# Patient Record
Sex: FEMALE | Race: WHITE | NOT HISPANIC OR LATINO | Employment: OTHER | URBAN - METROPOLITAN AREA
[De-identification: names, ages, dates, MRNs, and addresses within clinical notes are randomized per-mention and may not be internally consistent; named-entity substitution may affect disease eponyms.]

---

## 2023-03-06 ENCOUNTER — TELEPHONE (OUTPATIENT)
Dept: PRIMARY CARE | Facility: CLINIC | Age: 78
End: 2023-03-06
Payer: MEDICARE

## 2023-03-09 RX ORDER — CEPHALEXIN 500 MG/1
CAPSULE ORAL
COMMUNITY
Start: 2022-06-28 | End: 2023-03-10 | Stop reason: ALTCHOICE

## 2023-03-09 RX ORDER — IBUPROFEN 200 MG
CAPSULE ORAL
COMMUNITY

## 2023-03-09 RX ORDER — LACTASE 9000 UNIT
TABLET ORAL
COMMUNITY
End: 2024-01-23 | Stop reason: WASHOUT

## 2023-03-09 RX ORDER — AMLODIPINE BESYLATE 5 MG/1
1 TABLET ORAL DAILY
COMMUNITY
Start: 2020-01-15 | End: 2023-08-21 | Stop reason: DRUGHIGH

## 2023-03-09 RX ORDER — NALOXONE HYDROCHLORIDE 4 MG/.1ML
SPRAY NASAL
COMMUNITY
Start: 2020-11-12 | End: 2024-01-23 | Stop reason: WASHOUT

## 2023-03-09 RX ORDER — VIT C/E/ZN/COPPR/LUTEIN/ZEAXAN 250MG-90MG
CAPSULE ORAL
COMMUNITY
End: 2024-06-10 | Stop reason: SDUPTHER

## 2023-03-09 RX ORDER — ASPIRIN 81 MG/1
1 TABLET ORAL DAILY
COMMUNITY

## 2023-03-09 RX ORDER — ATORVASTATIN CALCIUM 40 MG/1
1 TABLET, FILM COATED ORAL NIGHTLY
COMMUNITY
Start: 2019-09-23 | End: 2023-08-21 | Stop reason: SDUPTHER

## 2023-03-09 RX ORDER — OXYCODONE HCL 20 MG/1
1 TABLET, FILM COATED, EXTENDED RELEASE ORAL EVERY 12 HOURS
COMMUNITY
End: 2023-06-02 | Stop reason: DRUGHIGH

## 2023-03-09 RX ORDER — POLYETHYLENE GLYCOL 3350 17 G/17G
POWDER, FOR SOLUTION ORAL EVERY OTHER DAY
COMMUNITY
Start: 2020-02-28

## 2023-03-09 RX ORDER — DOCUSATE SODIUM 100 MG/1
CAPSULE, LIQUID FILLED ORAL 2 TIMES DAILY
COMMUNITY

## 2023-03-09 RX ORDER — HYDROGEN PEROXIDE 3 %
1 SOLUTION, NON-ORAL MISCELLANEOUS DAILY
COMMUNITY

## 2023-03-09 RX ORDER — NYSTATIN 100000 [USP'U]/ML
SUSPENSION ORAL
COMMUNITY
Start: 2017-08-03 | End: 2023-03-10 | Stop reason: ALTCHOICE

## 2023-03-09 RX ORDER — ACETAMINOPHEN 500 MG
1 TABLET ORAL 3 TIMES DAILY
COMMUNITY
Start: 2020-09-01 | End: 2024-01-23 | Stop reason: WASHOUT

## 2023-03-09 RX ORDER — GABAPENTIN 100 MG/1
CAPSULE ORAL
COMMUNITY
Start: 2020-11-12 | End: 2023-06-05 | Stop reason: DRUGHIGH

## 2023-03-10 ENCOUNTER — OFFICE VISIT (OUTPATIENT)
Dept: PRIMARY CARE | Facility: CLINIC | Age: 78
End: 2023-03-10
Payer: MEDICARE

## 2023-03-10 VITALS
SYSTOLIC BLOOD PRESSURE: 160 MMHG | OXYGEN SATURATION: 94 % | WEIGHT: 149.4 LBS | DIASTOLIC BLOOD PRESSURE: 77 MMHG | HEART RATE: 102 BPM | BODY MASS INDEX: 24.11 KG/M2 | TEMPERATURE: 96.5 F

## 2023-03-10 DIAGNOSIS — F41.9 ACUTE ANXIETY: Primary | ICD-10-CM

## 2023-03-10 PROBLEM — M16.11 PRIMARY OSTEOARTHRITIS OF RIGHT HIP: Status: ACTIVE | Noted: 2023-03-10

## 2023-03-10 PROBLEM — R73.01 IMPAIRED FASTING GLUCOSE: Status: ACTIVE | Noted: 2023-03-10

## 2023-03-10 PROBLEM — M85.80 OSTEOPENIA: Status: ACTIVE | Noted: 2023-03-10

## 2023-03-10 PROBLEM — M43.06 LUMBAR SPONDYLOLYSIS: Status: ACTIVE | Noted: 2023-03-10

## 2023-03-10 PROBLEM — H91.93 BILATERAL HEARING LOSS: Status: ACTIVE | Noted: 2023-03-10

## 2023-03-10 PROBLEM — I49.3 PVC'S (PREMATURE VENTRICULAR CONTRACTIONS): Status: ACTIVE | Noted: 2023-03-10

## 2023-03-10 PROBLEM — R91.1 LUNG NODULE, SOLITARY: Status: ACTIVE | Noted: 2023-03-10

## 2023-03-10 PROBLEM — J34.2 NASAL SEPTAL DEVIATION: Status: RESOLVED | Noted: 2023-03-10 | Resolved: 2023-03-10

## 2023-03-10 PROBLEM — I45.2 RBBB (RIGHT BUNDLE BRANCH BLOCK WITH LEFT ANTERIOR FASCICULAR BLOCK): Status: ACTIVE | Noted: 2023-03-10

## 2023-03-10 PROBLEM — E06.3 HASHIMOTO'S THYROIDITIS: Status: ACTIVE | Noted: 2023-03-10

## 2023-03-10 PROBLEM — I10 BENIGN ESSENTIAL HYPERTENSION: Status: ACTIVE | Noted: 2023-03-10

## 2023-03-10 PROBLEM — M62.89 PELVIC FLOOR DYSFUNCTION: Status: ACTIVE | Noted: 2023-03-10

## 2023-03-10 PROBLEM — M25.552 HIP PAIN, BILATERAL: Status: ACTIVE | Noted: 2023-03-10

## 2023-03-10 PROBLEM — R79.89 LOW TSH LEVEL: Status: ACTIVE | Noted: 2023-03-10

## 2023-03-10 PROBLEM — E05.90 SUBCLINICAL HYPERTHYROIDISM: Status: ACTIVE | Noted: 2023-03-10

## 2023-03-10 PROBLEM — M25.551 HIP PAIN, BILATERAL: Status: ACTIVE | Noted: 2023-03-10

## 2023-03-10 PROBLEM — S63.509A WRIST SPRAIN: Status: RESOLVED | Noted: 2023-03-10 | Resolved: 2023-03-10

## 2023-03-10 PROBLEM — E04.2 MULTIPLE THYROID NODULES: Status: ACTIVE | Noted: 2023-03-10

## 2023-03-10 PROBLEM — E78.5 HYPERLIPIDEMIA: Status: ACTIVE | Noted: 2023-03-10

## 2023-03-10 PROBLEM — K59.09 CHRONIC CONSTIPATION: Status: ACTIVE | Noted: 2023-03-10

## 2023-03-10 PROBLEM — J34.3 NASAL TURBINATE HYPERTROPHY: Status: RESOLVED | Noted: 2023-03-10 | Resolved: 2023-03-10

## 2023-03-10 PROBLEM — K21.9 GERD (GASTROESOPHAGEAL REFLUX DISEASE): Status: ACTIVE | Noted: 2023-03-10

## 2023-03-10 PROBLEM — J45.20 MILD INTERMITTENT ASTHMA (HHS-HCC): Status: ACTIVE | Noted: 2023-03-10

## 2023-03-10 PROBLEM — H93.13 TINNITUS OF BOTH EARS: Status: ACTIVE | Noted: 2023-03-10

## 2023-03-10 PROBLEM — R93.1 AGATSTON CAC SCORE, >400: Status: ACTIVE | Noted: 2023-03-10

## 2023-03-10 PROBLEM — M48.061 SPINAL STENOSIS OF LUMBAR REGION: Status: ACTIVE | Noted: 2023-03-10

## 2023-03-10 PROCEDURE — 1160F RVW MEDS BY RX/DR IN RCRD: CPT | Performed by: FAMILY MEDICINE

## 2023-03-10 PROCEDURE — 99213 OFFICE O/P EST LOW 20 MIN: CPT | Performed by: FAMILY MEDICINE

## 2023-03-10 PROCEDURE — 3077F SYST BP >= 140 MM HG: CPT | Performed by: FAMILY MEDICINE

## 2023-03-10 PROCEDURE — 1036F TOBACCO NON-USER: CPT | Performed by: FAMILY MEDICINE

## 2023-03-10 PROCEDURE — 1159F MED LIST DOCD IN RCRD: CPT | Performed by: FAMILY MEDICINE

## 2023-03-10 PROCEDURE — 3078F DIAST BP <80 MM HG: CPT | Performed by: FAMILY MEDICINE

## 2023-03-10 PROCEDURE — 1157F ADVNC CARE PLAN IN RCRD: CPT | Performed by: FAMILY MEDICINE

## 2023-03-10 RX ORDER — BUSPIRONE HYDROCHLORIDE 15 MG/1
7.5 TABLET ORAL 2 TIMES DAILY
Qty: 30 TABLET | Refills: 5 | Status: SHIPPED | OUTPATIENT
Start: 2023-03-10 | End: 2023-03-29 | Stop reason: SDUPTHER

## 2023-03-10 RX ORDER — OXYCODONE HYDROCHLORIDE 10 MG/1
1 TABLET ORAL EVERY 6 HOURS PRN
COMMUNITY
Start: 2022-12-20 | End: 2023-06-02 | Stop reason: DRUGHIGH

## 2023-03-10 ASSESSMENT — ENCOUNTER SYMPTOMS: DEPRESSION: 1

## 2023-03-10 NOTE — PROGRESS NOTES
Subjective   Patient ID: Robyn Madera is a 78 y.o. female who presents for Depression and Anxiety.  Depression  Anxiety        Patient presents today with complaint of severe anxiety. Patient recently saw a new pain management provider - had been on narcotic regimen for over 1 year - now plan to wean which has precipitated this anxiety - denies suicidal or homicidal ideation. Otherwise feels well. No other complaints or concerns.    The patient's relevant past medical, surgical, family and social history was reviewed in Epic.    Review of Systems   Psychiatric/Behavioral:  Positive for depression.      A complete review of systems was performed and all systems were normal except what is noted in the HPI.    Objective   Physical Exam  Neurological:      Mental Status: She is alert.   Psychiatric:         Mood and Affect: Mood is anxious.             Assessment/Plan   Problem List Items Addressed This Visit       Acute anxiety - Primary     Start buspar as directed Risks, benefits and side effects reviewed with patient.   Call for worsening depression or anxiety, suicidal or homicidal ideation.   Will discuss circumstances with Pain Management  Reevaluate in 2 weeks..           Relevant Medications    busPIRone (Buspar) 15 mg tablet        Patient understands and agrees with treatment plan.         Reanna Martinez MD

## 2023-03-10 NOTE — ASSESSMENT & PLAN NOTE
Start buspar as directed Risks, benefits and side effects reviewed with patient.   Call for worsening depression or anxiety, suicidal or homicidal ideation.   Will discuss circumstances with Pain Management  Reevaluate in 2 weeks..

## 2023-03-22 RX ORDER — NYSTATIN 100000 [USP'U]/ML
10 SUSPENSION ORAL 4 TIMES DAILY
COMMUNITY
Start: 2023-03-18 | End: 2023-06-05 | Stop reason: ALTCHOICE

## 2023-03-25 PROBLEM — R79.89 LOW TSH LEVEL: Status: RESOLVED | Noted: 2023-03-10 | Resolved: 2023-03-25

## 2023-03-25 PROBLEM — R91.1 LUNG NODULE, SOLITARY: Status: RESOLVED | Noted: 2023-03-10 | Resolved: 2023-03-25

## 2023-03-25 NOTE — PROGRESS NOTES
Subjective   Patient ID: Robyn Madera is a 78 y.o. female who presents for No chief complaint on file..  HPI  Patient presents today with complaint of anxiety.    Started on buspar 15 mg 1/2 BID 2 weeks ago. Tolerating medication well.   Otherwise feels well. No other complaints or concerns.    The patient's relevant past medical, surgical, family and social history was reviewed in Epic.    Review of Systems  A complete review of systems was performed and all systems were normal except what is noted in the HPI.    Objective   Physical Exam        Assessment/Plan   Problem List Items Addressed This Visit       Acute anxiety - Primary     Call for worsening depression or anxiety, suicidal or homicidal ideation.              Patient understands and agrees with treatment plan.         Reanna Martinez MD

## 2023-03-27 ENCOUNTER — APPOINTMENT (OUTPATIENT)
Dept: PRIMARY CARE | Facility: CLINIC | Age: 78
End: 2023-03-27
Payer: MEDICARE

## 2023-03-29 ENCOUNTER — OFFICE VISIT (OUTPATIENT)
Dept: PRIMARY CARE | Facility: CLINIC | Age: 78
End: 2023-03-29
Payer: MEDICARE

## 2023-03-29 VITALS
WEIGHT: 150.2 LBS | HEART RATE: 85 BPM | DIASTOLIC BLOOD PRESSURE: 76 MMHG | TEMPERATURE: 96.3 F | BODY MASS INDEX: 24.24 KG/M2 | SYSTOLIC BLOOD PRESSURE: 138 MMHG | OXYGEN SATURATION: 95 %

## 2023-03-29 DIAGNOSIS — F41.9 ACUTE ANXIETY: Primary | ICD-10-CM

## 2023-03-29 PROBLEM — K44.9 HIATAL HERNIA: Status: ACTIVE | Noted: 2018-04-03

## 2023-03-29 PROCEDURE — 3075F SYST BP GE 130 - 139MM HG: CPT | Performed by: FAMILY MEDICINE

## 2023-03-29 PROCEDURE — 1157F ADVNC CARE PLAN IN RCRD: CPT | Performed by: FAMILY MEDICINE

## 2023-03-29 PROCEDURE — 3078F DIAST BP <80 MM HG: CPT | Performed by: FAMILY MEDICINE

## 2023-03-29 PROCEDURE — 99213 OFFICE O/P EST LOW 20 MIN: CPT | Performed by: FAMILY MEDICINE

## 2023-03-29 PROCEDURE — 1160F RVW MEDS BY RX/DR IN RCRD: CPT | Performed by: FAMILY MEDICINE

## 2023-03-29 PROCEDURE — 1159F MED LIST DOCD IN RCRD: CPT | Performed by: FAMILY MEDICINE

## 2023-03-29 PROCEDURE — 1036F TOBACCO NON-USER: CPT | Performed by: FAMILY MEDICINE

## 2023-03-29 RX ORDER — BUSPIRONE HYDROCHLORIDE 15 MG/1
15 TABLET ORAL 2 TIMES DAILY
Qty: 30 TABLET | Refills: 5 | Status: SHIPPED | OUTPATIENT
Start: 2023-03-29 | End: 2023-06-23 | Stop reason: SDUPTHER

## 2023-03-29 NOTE — PROGRESS NOTES
Subjective   Patient ID: Robyn Madera is a 78 y.o. female who presents for Anxiety.  HPI  Patient presents today with complaint of anxiety.      Anxiety a little better on buspar but still with complaint of some breakthrough symptoms. No side effects - denies suicidal or homicidal ideation. Has appointment pain management 4/10/23.    Otherwise feels well. No other complaints or concerns.    The patient's relevant past medical, surgical, family and social history was reviewed in Epic.    Review of Systems  A complete review of systems was performed and all systems were normal except what is noted in the HPI.    Objective   Physical Exam  Constitutional:       General: She is not in acute distress.     Appearance: Normal appearance.   HENT:      Head: Normocephalic and atraumatic.   Cardiovascular:      Rate and Rhythm: Normal rate and regular rhythm.      Heart sounds: Normal heart sounds.   Pulmonary:      Effort: Pulmonary effort is normal.      Breath sounds: Normal breath sounds.   Abdominal:      General: Abdomen is flat. Bowel sounds are normal.      Palpations: Abdomen is soft.      Tenderness: There is no abdominal tenderness.   Musculoskeletal:      Right lower leg: No edema.      Left lower leg: No edema.   Neurological:      Mental Status: She is alert.   Psychiatric:         Attention and Perception: Attention normal.         Mood and Affect: Mood is anxious.         Speech: Speech normal.         Behavior: Behavior normal.         Thought Content: Thought content normal.             Assessment/Plan   Problem List Items Addressed This Visit       Acute anxiety - Primary     Increase buspar to 1 tab BID Risks, benefits and side effects reviewed with patient.  Consider counseling - declines at this time  Call for worsening depression or anxiety, suicidal or homicidal ideation.   Reevaluate in 2 months.           Relevant Medications    busPIRone (Buspar) 15 mg tablet        Patient understands and  agrees with treatment plan.         Reanna Martinez MD

## 2023-03-29 NOTE — ASSESSMENT & PLAN NOTE
Increase buspar to 1 tab BID Risks, benefits and side effects reviewed with patient.  Consider counseling - declines at this time  Call for worsening depression or anxiety, suicidal or homicidal ideation.   Reevaluate in 2 months.

## 2023-04-20 ENCOUNTER — TELEPHONE (OUTPATIENT)
Dept: PRIMARY CARE | Facility: CLINIC | Age: 78
End: 2023-04-20
Payer: MEDICARE

## 2023-04-20 NOTE — TELEPHONE ENCOUNTER
You know that she is in a med change. Getting a daily headache.   Weaning off opioids  Symptoms include Headache, nausea like morning sickness. Using pepto bismol to control nausea. In the early afternoon she experiences Sciatica, leg neuropathy, usually her left leg but both legs today    She has the Oxycontin cut from 20-10mg.    Oxycodon 5mg immediate release as a late late afternoon bridge. She is allowed to use two a day but is only using one.    Question: Is it ok for her to take Acetaminophen 500mg rapid release extra strength.  up to  2-3 tablets per day for about a week to billings through these headaches.     Patient states we can leave message f she is not home

## 2023-06-02 PROBLEM — Z96.651 PRESENCE OF RIGHT ARTIFICIAL KNEE JOINT: Status: RESOLVED | Noted: 2017-09-21 | Resolved: 2023-06-02

## 2023-06-02 RX ORDER — ONDANSETRON 4 MG/1
4 TABLET, FILM COATED ORAL 3 TIMES DAILY PRN
COMMUNITY
Start: 2023-04-11 | End: 2023-08-21 | Stop reason: ALTCHOICE

## 2023-06-02 RX ORDER — OXYCODONE HYDROCHLORIDE 10 MG/1
1 TABLET, FILM COATED, EXTENDED RELEASE ORAL DAILY
COMMUNITY
Start: 2023-04-18 | End: 2023-08-16 | Stop reason: WASHOUT

## 2023-06-02 RX ORDER — OXYCODONE HYDROCHLORIDE 5 MG/1
1 TABLET ORAL 2 TIMES DAILY PRN
COMMUNITY
Start: 2023-04-18 | End: 2023-10-17 | Stop reason: SDUPTHER

## 2023-06-05 ENCOUNTER — OFFICE VISIT (OUTPATIENT)
Dept: PRIMARY CARE | Facility: CLINIC | Age: 78
End: 2023-06-05
Payer: MEDICARE

## 2023-06-05 VITALS
OXYGEN SATURATION: 94 % | HEIGHT: 66 IN | TEMPERATURE: 97.6 F | BODY MASS INDEX: 24.46 KG/M2 | HEART RATE: 93 BPM | WEIGHT: 152.2 LBS | DIASTOLIC BLOOD PRESSURE: 74 MMHG | SYSTOLIC BLOOD PRESSURE: 162 MMHG

## 2023-06-05 DIAGNOSIS — I10 BENIGN ESSENTIAL HYPERTENSION: ICD-10-CM

## 2023-06-05 DIAGNOSIS — M48.062 SPINAL STENOSIS OF LUMBAR REGION WITH NEUROGENIC CLAUDICATION: ICD-10-CM

## 2023-06-05 DIAGNOSIS — F41.1 GENERALIZED ANXIETY DISORDER: Primary | ICD-10-CM

## 2023-06-05 DIAGNOSIS — R91.1 LUNG NODULE, SOLITARY: ICD-10-CM

## 2023-06-05 PROCEDURE — 1157F ADVNC CARE PLAN IN RCRD: CPT | Performed by: FAMILY MEDICINE

## 2023-06-05 PROCEDURE — 3077F SYST BP >= 140 MM HG: CPT | Performed by: FAMILY MEDICINE

## 2023-06-05 PROCEDURE — 99213 OFFICE O/P EST LOW 20 MIN: CPT | Performed by: FAMILY MEDICINE

## 2023-06-05 PROCEDURE — 1160F RVW MEDS BY RX/DR IN RCRD: CPT | Performed by: FAMILY MEDICINE

## 2023-06-05 PROCEDURE — 3078F DIAST BP <80 MM HG: CPT | Performed by: FAMILY MEDICINE

## 2023-06-05 PROCEDURE — 1036F TOBACCO NON-USER: CPT | Performed by: FAMILY MEDICINE

## 2023-06-05 PROCEDURE — 1159F MED LIST DOCD IN RCRD: CPT | Performed by: FAMILY MEDICINE

## 2023-06-05 RX ORDER — HYDROXYZINE PAMOATE 25 MG/1
25 CAPSULE ORAL 3 TIMES DAILY PRN
COMMUNITY
End: 2023-08-21 | Stop reason: ALTCHOICE

## 2023-06-05 RX ORDER — GABAPENTIN 100 MG/1
300 CAPSULE ORAL 3 TIMES DAILY
Qty: 810 CAPSULE | Refills: 3 | Status: SHIPPED
Start: 2023-06-05 | End: 2023-12-10 | Stop reason: SDUPTHER

## 2023-06-05 RX ORDER — KETOCONAZOLE 20 MG/ML
2 SHAMPOO, SUSPENSION TOPICAL AS NEEDED
COMMUNITY
Start: 2023-05-23 | End: 2024-01-23 | Stop reason: WASHOUT

## 2023-06-05 RX ORDER — DULOXETIN HYDROCHLORIDE 30 MG/1
30 CAPSULE, DELAYED RELEASE ORAL DAILY
Qty: 30 CAPSULE | Refills: 5 | Status: SHIPPED | OUTPATIENT
Start: 2023-06-05 | End: 2023-08-21 | Stop reason: DRUGHIGH

## 2023-06-05 NOTE — ASSESSMENT & PLAN NOTE
Overall improved  Continue current dose buspar  Start cymbalta 30 mg daily Risks, benefits and side effects reviewed with patient.   Can discuss further titration with Pain Mgt  May use prn vistaril as prescribed by Pain Mgt  Call for worsening depression or anxiety, suicidal or homicidal ideation.   Reevaluate in 2 months.

## 2023-06-05 NOTE — ASSESSMENT & PLAN NOTE
Elevated today but patient very anxious  Running 130s at home - will continue to monitor and call if consistently >140/90  Reevaluate in 2 months.

## 2023-06-05 NOTE — PROGRESS NOTES
Subjective   Patient ID: Robyn Madera is a 78 y.o. female who presents for Follow-up (2 month ).      HPI  Patient presents today with complaint of anxiety. In proces of weaning off opioids per Pain Management. This has caused significant anxiety for patient. Anxiety is improved on buspar but still with some irritability and breakthrough symptoms - denies suicidal or homicidal ideation.  Pain has increased some on lower doses of meds. Per patient plan is to start her on cymbalta. Sees Pain Mgt 6/18/23.    Otherwise feels well. No other complaints or concerns.    The patient's relevant past medical, surgical, family and social history was reviewed in Epic.    Review of Systems  A complete review of systems was performed and all systems were normal except what is noted in the HPI.    Objective   Physical Exam  Constitutional:       General: She is not in acute distress.     Appearance: Normal appearance.   HENT:      Head: Normocephalic and atraumatic.   Cardiovascular:      Rate and Rhythm: Normal rate and regular rhythm.      Heart sounds: Normal heart sounds.   Pulmonary:      Effort: Pulmonary effort is normal.      Breath sounds: Normal breath sounds.   Abdominal:      General: Abdomen is flat. Bowel sounds are normal.      Palpations: Abdomen is soft.      Tenderness: There is no abdominal tenderness.   Musculoskeletal:      Right lower leg: No edema.      Left lower leg: No edema.   Neurological:      Mental Status: She is alert.   Psychiatric:         Mood and Affect: Mood is anxious.         Behavior: Behavior normal.         Thought Content: Thought content normal.             Assessment/Plan   Problem List Items Addressed This Visit       Benign essential hypertension     Elevated today but patient very anxious  Running 130s at home - will continue to monitor and call if consistently >140/90  Reevaluate in 2 months.           Lung nodule, solitary    Relevant Orders    CT chest wo IV contrast    Spinal  stenosis of lumbar region - Primary    Relevant Medications    DULoxetine (Cymbalta) 30 mg DR capsule    Generalized anxiety disorder     Overall improved  Continue current dose buspar  Start cymbalta 30 mg daily Risks, benefits and side effects reviewed with patient.   Can discuss further titration with Pain Mgt  May use prn vistaril as prescribed by Pain Mgt  Call for worsening depression or anxiety, suicidal or homicidal ideation.   Reevaluate in 2 months.               Patient understands and agrees with treatment plan.         Reanna Martinez MD

## 2023-06-05 NOTE — PATIENT INSTRUCTIONS
I would like you to follow up in 2 months  Please have all labs that were ordered done at least 1 week prior to your visit.     Continue current medication for hypertension. Continue to monitor blood pressure.  Call if blood pressure consistently >140/90.  Low salt diet recommended.  Increase physical activity as able.  Reevaluate in 2 months.

## 2023-06-19 ENCOUNTER — HOSPITAL ENCOUNTER (OUTPATIENT)
Dept: DATA CONVERSION | Facility: HOSPITAL | Age: 78
End: 2023-06-19
Attending: SURGERY | Admitting: SURGERY
Payer: MEDICARE

## 2023-06-19 DIAGNOSIS — E78.5 HYPERLIPIDEMIA, UNSPECIFIED: ICD-10-CM

## 2023-06-19 DIAGNOSIS — R13.10 DYSPHAGIA, UNSPECIFIED: ICD-10-CM

## 2023-06-19 DIAGNOSIS — Z79.82 LONG TERM (CURRENT) USE OF ASPIRIN: ICD-10-CM

## 2023-06-19 DIAGNOSIS — I10 ESSENTIAL (PRIMARY) HYPERTENSION: ICD-10-CM

## 2023-06-19 DIAGNOSIS — K31.7 POLYP OF STOMACH AND DUODENUM: ICD-10-CM

## 2023-06-19 DIAGNOSIS — K22.89 OTHER SPECIFIED DISEASE OF ESOPHAGUS: ICD-10-CM

## 2023-06-19 DIAGNOSIS — Z88.0 ALLERGY STATUS TO PENICILLIN: ICD-10-CM

## 2023-06-19 DIAGNOSIS — K29.50 UNSPECIFIED CHRONIC GASTRITIS WITHOUT BLEEDING: ICD-10-CM

## 2023-06-19 DIAGNOSIS — J45.909 UNSPECIFIED ASTHMA, UNCOMPLICATED (HHS-HCC): ICD-10-CM

## 2023-06-20 DIAGNOSIS — E04.1 THYROID NODULE: Primary | ICD-10-CM

## 2023-06-22 ENCOUNTER — PATIENT MESSAGE (OUTPATIENT)
Dept: PRIMARY CARE | Facility: CLINIC | Age: 78
End: 2023-06-22
Payer: MEDICARE

## 2023-06-23 ENCOUNTER — OFFICE VISIT (OUTPATIENT)
Dept: PRIMARY CARE | Facility: CLINIC | Age: 78
End: 2023-06-23
Payer: MEDICARE

## 2023-06-23 VITALS
TEMPERATURE: 97.8 F | RESPIRATION RATE: 16 BRPM | DIASTOLIC BLOOD PRESSURE: 79 MMHG | OXYGEN SATURATION: 98 % | HEART RATE: 74 BPM | WEIGHT: 151.6 LBS | BODY MASS INDEX: 24.36 KG/M2 | SYSTOLIC BLOOD PRESSURE: 159 MMHG | HEIGHT: 66 IN

## 2023-06-23 DIAGNOSIS — M48.061 SPINAL STENOSIS OF LUMBAR REGION WITHOUT NEUROGENIC CLAUDICATION: Primary | ICD-10-CM

## 2023-06-23 DIAGNOSIS — R31.29 MICROHEMATURIA: ICD-10-CM

## 2023-06-23 DIAGNOSIS — R35.0 INCREASED FREQUENCY OF URINATION: ICD-10-CM

## 2023-06-23 DIAGNOSIS — F41.9 ACUTE ANXIETY: ICD-10-CM

## 2023-06-23 DIAGNOSIS — M41.9 SCOLIOSIS OF LUMBAR SPINE, UNSPECIFIED SCOLIOSIS TYPE: ICD-10-CM

## 2023-06-23 PROBLEM — M54.16 CHRONIC LUMBAR RADICULOPATHY: Status: ACTIVE | Noted: 2023-06-23

## 2023-06-23 LAB
COMPLETE PATHOLOGY REPORT: NORMAL
CONVERTED CLINICAL DIAGNOSIS-HISTORY: NORMAL
CONVERTED FINAL DIAGNOSIS: NORMAL
CONVERTED FINAL REPORT PDF LINK TO COPY AND PASTE: NORMAL
CONVERTED GROSS DESCRIPTION: NORMAL
POC APPEARANCE, URINE: CLEAR
POC BILIRUBIN, URINE: NEGATIVE
POC BLOOD, URINE: ABNORMAL
POC COLOR, URINE: YELLOW
POC GLUCOSE, URINE: NEGATIVE MG/DL
POC KETONES, URINE: ABNORMAL MG/DL
POC LEUKOCYTES, URINE: NEGATIVE
POC NITRITE,URINE: NEGATIVE
POC PH, URINE: 8 PH
POC PROTEIN, URINE: ABNORMAL MG/DL
POC SPECIFIC GRAVITY, URINE: 1.01
POC UROBILINOGEN, URINE: 0.2 EU/DL

## 2023-06-23 PROCEDURE — 3078F DIAST BP <80 MM HG: CPT | Performed by: NURSE PRACTITIONER

## 2023-06-23 PROCEDURE — 3077F SYST BP >= 140 MM HG: CPT | Performed by: NURSE PRACTITIONER

## 2023-06-23 PROCEDURE — 1157F ADVNC CARE PLAN IN RCRD: CPT | Performed by: NURSE PRACTITIONER

## 2023-06-23 PROCEDURE — 99213 OFFICE O/P EST LOW 20 MIN: CPT | Performed by: NURSE PRACTITIONER

## 2023-06-23 PROCEDURE — 81002 URINALYSIS NONAUTO W/O SCOPE: CPT | Performed by: NURSE PRACTITIONER

## 2023-06-23 PROCEDURE — 1036F TOBACCO NON-USER: CPT | Performed by: NURSE PRACTITIONER

## 2023-06-23 PROCEDURE — 1159F MED LIST DOCD IN RCRD: CPT | Performed by: NURSE PRACTITIONER

## 2023-06-23 PROCEDURE — 1160F RVW MEDS BY RX/DR IN RCRD: CPT | Performed by: NURSE PRACTITIONER

## 2023-06-23 RX ORDER — BUSPIRONE HYDROCHLORIDE 15 MG/1
15 TABLET ORAL 2 TIMES DAILY
Qty: 60 TABLET | Refills: 5 | Status: SHIPPED | OUTPATIENT
Start: 2023-06-23 | End: 2023-12-05 | Stop reason: SDUPTHER

## 2023-06-23 ASSESSMENT — ENCOUNTER SYMPTOMS
HEMATURIA: 0
ABDOMINAL PAIN: 0
HEADACHES: 0
FATIGUE: 0
FREQUENCY: 1
CHILLS: 0
ABDOMINAL DISTENTION: 0
COUGH: 0
NAUSEA: 0
WHEEZING: 0
DIZZINESS: 0
DIARRHEA: 0
WEAKNESS: 0
FEVER: 0
DIFFICULTY URINATING: 0

## 2023-06-23 NOTE — PROGRESS NOTES
Subjective   Patient ID: Robyn Madera is a 78 y.o. female who presents for Urinary Frequency (Past 2 weeks ) and urinary pain.    UA unremarkable. Sees urology for microhematuria. 2018 cystoscopy unremarkable.   Patient denies any painful urination. Has pressure in the bladder area. Pain management-spinal stenosis, lumbar radiculopathy   Patient reports pain management's thoughts are that the spine may be putting pressure on bladder area  She is concerned with nocturia because it wakes her up at night and she has a difficult time falling asleep   Patient needs refill of buspar-will send appropriate count of 60/month. Previously sent 30tabs that only covered 2 weeks, refills have ran out .     CT of chest reviewed-stable pulmonary nodules   Scooliosis     .  Current Outpatient Medications:   ·  acetaminophen (Tylenol) 500 mg tablet, Take 1 tablet (500 mg) by mouth 3 times a day., Disp: , Rfl:   ·  amLODIPine (Norvasc) 5 mg tablet, Take 1 tablet (5 mg) by mouth once daily., Disp: , Rfl:   ·  aspirin 81 mg EC tablet, Take 1 tablet (81 mg) by mouth once daily., Disp: , Rfl:   ·  atorvastatin (Lipitor) 40 mg tablet, Take 1 tablet (40 mg) by mouth once daily at bedtime., Disp: , Rfl:   ·  BACILLUS COAGULANS-INULIN ORAL, Take by mouth., Disp: , Rfl:   ·  calcium carbonate-vitamin D3 500 mg-3.125 mcg (125 unit) tablet tablet, Take by mouth., Disp: , Rfl:   ·  cholecalciferol (Vitamin D-3) 25 MCG (1000 UT) capsule, Take by mouth., Disp: , Rfl:   ·  diclofenac sodium 1 % kit, APPLY AS DIRECTED FOUR TIMES DAILY, Disp: , Rfl:   ·  docusate sodium (Colace) 100 mg capsule, Take by mouth twice a day., Disp: , Rfl:   ·  DULoxetine (Cymbalta) 30 mg DR capsule, Take 1 capsule (30 mg) by mouth once daily. Do not crush or chew., Disp: 30 capsule, Rfl: 5  ·  esomeprazole (NexIUM) 20 mg DR capsule, Take 1 capsule (20 mg) by mouth once daily., Disp: , Rfl:   ·  gabapentin (Neurontin) 100 mg capsule, Take 3 capsules (300 mg) by mouth  "3 times a day., Disp: 810 capsule, Rfl: 3  ·  hydrOXYzine pamoate (Vistaril) 25 mg capsule, Take 1 capsule (25 mg) by mouth 3 times a day as needed for anxiety., Disp: , Rfl:   ·  ketoconazole (NIZOral) 2 % shampoo, Apply 2 % topically if needed for irritation or dandruff., Disp: , Rfl:   ·  lactase (Lactaid) 9,000 unit tablet, Take by mouth., Disp: , Rfl:   ·  naloxone (Narcan) 4 mg/0.1 mL nasal spray, Administer into affected nostril(s)., Disp: , Rfl:   ·  ondansetron (Zofran) 4 mg tablet, Take 1 tablet (4 mg) by mouth 3 times a day as needed for nausea., Disp: , Rfl:   ·  oxyCODONE (Roxicodone) 5 mg immediate release tablet, Take 1 tablet (5 mg) by mouth 2 times a day as needed for pain., Disp: , Rfl:   ·  OxyCONTIN 10 mg 12 hr tablet, Take 1 tablet (10 mg) by mouth once daily., Disp: , Rfl:   ·  polyethylene glycol (Glycolax) 17 gram/dose powder, Take by mouth every other day., Disp: , Rfl:   ·  busPIRone (Buspar) 15 mg tablet, Take 1 tablet (15 mg) by mouth 2 times a day., Disp: 60 tablet, Rfl: 5     Urinary Frequency   Associated symptoms include frequency and urgency. Pertinent negatives include no chills, hematuria or nausea.        Review of Systems   Constitutional:  Negative for chills, fatigue and fever.   Respiratory:  Negative for cough and wheezing.    Cardiovascular:  Negative for chest pain and leg swelling.   Gastrointestinal:  Negative for abdominal distention, abdominal pain, diarrhea and nausea.   Genitourinary:  Positive for frequency and urgency. Negative for difficulty urinating and hematuria.   Neurological:  Negative for dizziness, weakness and headaches.       Objective   /79   Pulse 74   Temp 36.6 °C (97.8 °F)   Resp 16   Ht 1.676 m (5' 6\")   Wt 68.8 kg (151 lb 9.6 oz)   SpO2 98%   BMI 24.47 kg/m²     Physical Exam  Vitals reviewed.   Constitutional:       Appearance: Normal appearance.   HENT:      Head: Normocephalic.   Eyes:      Conjunctiva/sclera: Conjunctivae normal. "   Cardiovascular:      Rate and Rhythm: Normal rate and regular rhythm.      Pulses: Normal pulses.      Heart sounds: Normal heart sounds.   Pulmonary:      Effort: Pulmonary effort is normal.      Breath sounds: Normal breath sounds. No wheezing.   Abdominal:      General: Bowel sounds are normal. There is no distension.   Musculoskeletal:      Lumbar back: Spasms present. Decreased range of motion. Scoliosis present.   Skin:     General: Skin is warm and dry.   Neurological:      General: No focal deficit present.      Mental Status: She is alert and oriented to person, place, and time.   Psychiatric:         Mood and Affect: Mood normal.         Behavior: Behavior normal.         Assessment/Plan   Problem List Items Addressed This Visit       Spinal stenosis of lumbar region - Primary     Follow up pain management            Lumbar scoliosis    Increased frequency of urination     UA unremarkable  Follow up with urology          Relevant Orders    POCT UA (nonautomated) manually resulted (Completed)    Acute anxiety    Relevant Medications    busPIRone (Buspar) 15 mg tablet

## 2023-08-14 LAB
ALANINE AMINOTRANSFERASE (SGPT) (U/L) IN SER/PLAS: 19 U/L (ref 7–45)
ALBUMIN (G/DL) IN SER/PLAS: 4.5 G/DL (ref 3.4–5)
ALKALINE PHOSPHATASE (U/L) IN SER/PLAS: 69 U/L (ref 33–136)
ANION GAP IN SER/PLAS: 11 MMOL/L (ref 10–20)
ASPARTATE AMINOTRANSFERASE (SGOT) (U/L) IN SER/PLAS: 24 U/L (ref 9–39)
BILIRUBIN TOTAL (MG/DL) IN SER/PLAS: 0.9 MG/DL (ref 0–1.2)
CALCIUM (MG/DL) IN SER/PLAS: 9.1 MG/DL (ref 8.6–10.3)
CARBON DIOXIDE, TOTAL (MMOL/L) IN SER/PLAS: 28 MMOL/L (ref 21–32)
CHLORIDE (MMOL/L) IN SER/PLAS: 105 MMOL/L (ref 98–107)
CHOLESTEROL (MG/DL) IN SER/PLAS: 165 MG/DL (ref 0–199)
CHOLESTEROL IN HDL (MG/DL) IN SER/PLAS: 96.1 MG/DL
CHOLESTEROL IN LDL (MG/DL) IN SER/PLAS BY DIRECT ASSAY: 54 MG/DL (ref 0–129)
CHOLESTEROL/HDL RATIO: 1.7
CREATININE (MG/DL) IN SER/PLAS: 0.71 MG/DL (ref 0.5–1.05)
ERYTHROCYTE DISTRIBUTION WIDTH (RATIO) BY AUTOMATED COUNT: 13.6 % (ref 11.5–14.5)
ERYTHROCYTE MEAN CORPUSCULAR HEMOGLOBIN CONCENTRATION (G/DL) BY AUTOMATED: 32.1 G/DL (ref 32–36)
ERYTHROCYTE MEAN CORPUSCULAR VOLUME (FL) BY AUTOMATED COUNT: 97 FL (ref 80–100)
ERYTHROCYTES (10*6/UL) IN BLOOD BY AUTOMATED COUNT: 4.32 X10E12/L (ref 4–5.2)
ESTIMATED AVERAGE GLUCOSE FOR HBA1C: 140 MG/DL
GFR FEMALE: 88 ML/MIN/1.73M2
GLUCOSE (MG/DL) IN SER/PLAS: 109 MG/DL (ref 74–99)
HEMATOCRIT (%) IN BLOOD BY AUTOMATED COUNT: 42.1 % (ref 36–46)
HEMOGLOBIN (G/DL) IN BLOOD: 13.5 G/DL (ref 12–16)
HEMOGLOBIN A1C/HEMOGLOBIN TOTAL IN BLOOD: 6.5 %
LDL: 53 MG/DL (ref 0–99)
LEUKOCYTES (10*3/UL) IN BLOOD BY AUTOMATED COUNT: 5.3 X10E9/L (ref 4.4–11.3)
PLATELETS (10*3/UL) IN BLOOD AUTOMATED COUNT: 184 X10E9/L (ref 150–450)
POTASSIUM (MMOL/L) IN SER/PLAS: 3.8 MMOL/L (ref 3.5–5.3)
PROTEIN TOTAL: 7.6 G/DL (ref 6.4–8.2)
SODIUM (MMOL/L) IN SER/PLAS: 140 MMOL/L (ref 136–145)
THYROTROPIN (MIU/L) IN SER/PLAS BY DETECTION LIMIT <= 0.05 MIU/L: 0.24 MIU/L (ref 0.44–3.98)
THYROXINE (T4) FREE (NG/DL) IN SER/PLAS: 0.66 NG/DL (ref 0.61–1.12)
TRIGLYCERIDE (MG/DL) IN SER/PLAS: 79 MG/DL (ref 0–149)
UREA NITROGEN (MG/DL) IN SER/PLAS: 16 MG/DL (ref 6–23)
VLDL: 16 MG/DL (ref 0–40)

## 2023-08-16 PROBLEM — N32.81 OVERACTIVE BLADDER: Status: ACTIVE | Noted: 2023-08-16

## 2023-08-20 PROBLEM — F41.9 ACUTE ANXIETY: Status: RESOLVED | Noted: 2023-06-23 | Resolved: 2023-08-20

## 2023-08-20 PROBLEM — R91.1 LUNG NODULE, SOLITARY: Status: RESOLVED | Noted: 2023-03-10 | Resolved: 2023-08-20

## 2023-08-20 NOTE — PROGRESS NOTES
Subjective   Patient ID: Robyn Madera is a 78 y.o. female who presents for Follow-up.    HPI  Patient presents today for follow up labs and chronic conditions.  Having difficulty with process of weaning off narcotics. Now on oxycodone twice daily but breakthrough pain in afternoon. Has upcoming follow up Pain Mgt.  Patient otherwise feels well. No other complaints or concerns.    The patient's relevant past medical, surgical, family, and social history was reviewed in Psychiatric.  All pertinent lab work and results for this visit were reviewed with patient.      Recent Results (from the past 672 hour(s))   CBC    Collection Time: 08/14/23  8:36 AM   Result Value Ref Range    WBC 5.3 4.4 - 11.3 x10E9/L    RBC 4.32 4.00 - 5.20 x10E12/L    Hemoglobin 13.5 12.0 - 16.0 g/dL    Hematocrit 42.1 36.0 - 46.0 %    MCV 97 80 - 100 fL    MCHC 32.1 32.0 - 36.0 g/dL    Platelets 184 150 - 450 x10E9/L    RDW 13.6 11.5 - 14.5 %   Comprehensive Metabolic Panel    Collection Time: 08/14/23  8:36 AM   Result Value Ref Range    Glucose 109 (H) 74 - 99 mg/dL    Sodium 140 136 - 145 mmol/L    Potassium 3.8 3.5 - 5.3 mmol/L    Chloride 105 98 - 107 mmol/L    Bicarbonate 28 21 - 32 mmol/L    Anion Gap 11 10 - 20 mmol/L    Urea Nitrogen 16 6 - 23 mg/dL    Creatinine 0.71 0.50 - 1.05 mg/dL    GFR Female 88 >90 mL/min/1.73m2    Calcium 9.1 8.6 - 10.3 mg/dL    Albumin 4.5 3.4 - 5.0 g/dL    Alkaline Phosphatase 69 33 - 136 U/L    Total Protein 7.6 6.4 - 8.2 g/dL    AST 24 9 - 39 U/L    Total Bilirubin 0.9 0.0 - 1.2 mg/dL    ALT (SGPT) 19 7 - 45 U/L   Lipid Panel    Collection Time: 08/14/23  8:36 AM   Result Value Ref Range    Cholesterol 165 0 - 199 mg/dL    HDL 96.1 mg/dL    Cholesterol/HDL Ratio 1.7     LDL 53 0 - 99 mg/dL    VLDL 16 0 - 40 mg/dL    Triglycerides 79 0 - 149 mg/dL   Hemoglobin A1C    Collection Time: 08/14/23  8:36 AM   Result Value Ref Range    Hemoglobin A1C 6.5 (A) %    Estimated Average Glucose 140 MG/DL   Cholesterol, LDL  "Direct    Collection Time: 08/14/23  8:36 AM   Result Value Ref Range    LDL Direct 54 0 - 129 mg/dL   TSH with reflex to Free T4 if abnormal    Collection Time: 08/14/23  8:36 AM   Result Value Ref Range    TSH 0.24 (L) 0.44 - 3.98 mIU/L   Thyroxine, Free    Collection Time: 08/14/23  8:36 AM   Result Value Ref Range    Free T4 0.66 0.61 - 1.12 ng/dL         Review of Systems   A complete review of systems was performed and all systems were normal except what is noted in the HPI.        Objective   /75   Pulse 105   Temp 36.1 °C (97 °F)   Ht 1.676 m (5' 6\")   Wt 69.9 kg (154 lb)   SpO2 96%   BMI 24.86 kg/m²    Physical Exam  Constitutional:       Appearance: Normal appearance.   HENT:      Head: Normocephalic and atraumatic.   Neck:      Vascular: No carotid bruit.   Cardiovascular:      Rate and Rhythm: Normal rate and regular rhythm.      Heart sounds: Normal heart sounds.   Pulmonary:      Effort: Pulmonary effort is normal.      Breath sounds: Normal breath sounds. No wheezing, rhonchi or rales.   Abdominal:      General: Abdomen is flat. Bowel sounds are normal.      Palpations: Abdomen is soft.      Tenderness: There is no abdominal tenderness. There is no guarding.   Musculoskeletal:         General: Normal range of motion.      Right lower leg: No edema.      Left lower leg: No edema.   Skin:     General: Skin is dry.   Neurological:      General: No focal deficit present.      Mental Status: She is alert and oriented to person, place, and time.   Psychiatric:         Behavior: Behavior normal.         Thought Content: Thought content normal.      Comments: Mildly anxious         Health Maintenance Due   Topic Date Due    Hepatitis C Screening  Never done    COVID-19 Vaccine (5 - Pfizer series) 11/11/2022        Assessment/Plan   Problem List Items Addressed This Visit       Benign essential hypertension     Well controlled continue current medication. Reevaluate in 3 months.            Relevant " Medications    amLODIPine (Norvasc) 10 mg tablet    Other Relevant Orders    Basic metabolic panel    Mixed hyperlipidemia - Primary     Well controlled continue current medication. Reevaluate in 6 months.            Relevant Medications    atorvastatin (Lipitor) 40 mg tablet    Impaired fasting glucose     A1c up to 6.5  Borderline diabetes diagnosis reviewed with patient  Declines medication at this time  Work on diet reviewed with patient.   Recheck 3 months           Relevant Orders    Hemoglobin A1C    Basic metabolic panel    RESOLVED: Lung nodule, solitary     Stable CT 6/23  No follow up needed         Spinal stenosis of lumbar region     Continue current medication and weaning process per Pain Mgt  Start diclofenac-misoprostol in afternoon as directed  - Risks, benefits and side effects reviewed with patient.             Relevant Medications    diclofenac-misoprostoL ([employer] 75)  mg-mcg EC tablet    Subclinical hyperthyroidism     Saw endocrine 2/23  Continue to monitor   Recheck 6 months         Relevant Orders    TSH with reflex to Free T4 if abnormal    Generalized anxiety disorder     Overall improved  Continue current dose buspar  Continue current dose cymbalta  Call for worsening depression or anxiety, suicidal or homicidal ideation.   Reevaluate in 3 months.           Relevant Medications    DULoxetine (Cymbalta) 60 mg DR capsule     Other Visit Diagnoses       Encounter for hepatitis C screening test for low risk patient        Relevant Orders    Hepatitis C Antibody              Patient understands and agrees with care plan.           Reanna Martinez MD

## 2023-08-20 NOTE — ASSESSMENT & PLAN NOTE
A1c up to 6.5  Borderline diabetes diagnosis reviewed with patient  Declines medication at this time  Work on diet reviewed with patient.   Recheck 3 months

## 2023-08-20 NOTE — ASSESSMENT & PLAN NOTE
Overall improved  Continue current dose buspar  Continue current dose cymbalta  Call for worsening depression or anxiety, suicidal or homicidal ideation.   Reevaluate in 3 months.

## 2023-08-21 ENCOUNTER — OFFICE VISIT (OUTPATIENT)
Dept: PRIMARY CARE | Facility: CLINIC | Age: 78
End: 2023-08-21
Payer: MEDICARE

## 2023-08-21 VITALS
DIASTOLIC BLOOD PRESSURE: 75 MMHG | SYSTOLIC BLOOD PRESSURE: 136 MMHG | HEIGHT: 66 IN | HEART RATE: 105 BPM | WEIGHT: 154 LBS | OXYGEN SATURATION: 96 % | TEMPERATURE: 97 F | BODY MASS INDEX: 24.75 KG/M2

## 2023-08-21 DIAGNOSIS — F41.1 GENERALIZED ANXIETY DISORDER: ICD-10-CM

## 2023-08-21 DIAGNOSIS — R73.01 IMPAIRED FASTING GLUCOSE: ICD-10-CM

## 2023-08-21 DIAGNOSIS — I10 BENIGN ESSENTIAL HYPERTENSION: ICD-10-CM

## 2023-08-21 DIAGNOSIS — Z11.59 ENCOUNTER FOR HEPATITIS C SCREENING TEST FOR LOW RISK PATIENT: ICD-10-CM

## 2023-08-21 DIAGNOSIS — E05.90 SUBCLINICAL HYPERTHYROIDISM: ICD-10-CM

## 2023-08-21 DIAGNOSIS — M48.062 SPINAL STENOSIS OF LUMBAR REGION WITH NEUROGENIC CLAUDICATION: ICD-10-CM

## 2023-08-21 DIAGNOSIS — E78.2 MIXED HYPERLIPIDEMIA: Primary | ICD-10-CM

## 2023-08-21 DIAGNOSIS — R91.1 LUNG NODULE, SOLITARY: ICD-10-CM

## 2023-08-21 PROCEDURE — 3075F SYST BP GE 130 - 139MM HG: CPT | Performed by: FAMILY MEDICINE

## 2023-08-21 PROCEDURE — 1160F RVW MEDS BY RX/DR IN RCRD: CPT | Performed by: FAMILY MEDICINE

## 2023-08-21 PROCEDURE — 99214 OFFICE O/P EST MOD 30 MIN: CPT | Performed by: FAMILY MEDICINE

## 2023-08-21 PROCEDURE — 1036F TOBACCO NON-USER: CPT | Performed by: FAMILY MEDICINE

## 2023-08-21 PROCEDURE — 3078F DIAST BP <80 MM HG: CPT | Performed by: FAMILY MEDICINE

## 2023-08-21 PROCEDURE — 1159F MED LIST DOCD IN RCRD: CPT | Performed by: FAMILY MEDICINE

## 2023-08-21 PROCEDURE — 1157F ADVNC CARE PLAN IN RCRD: CPT | Performed by: FAMILY MEDICINE

## 2023-08-21 RX ORDER — ATORVASTATIN CALCIUM 40 MG/1
40 TABLET, FILM COATED ORAL NIGHTLY
Qty: 90 TABLET | Refills: 3 | Status: SHIPPED | OUTPATIENT
Start: 2023-08-21 | End: 2024-08-20

## 2023-08-21 RX ORDER — DULOXETIN HYDROCHLORIDE 30 MG/1
60 CAPSULE, DELAYED RELEASE ORAL DAILY
Qty: 180 CAPSULE | Refills: 3 | Status: SHIPPED
Start: 2023-08-21 | End: 2023-08-21 | Stop reason: DRUGHIGH

## 2023-08-21 RX ORDER — DULOXETIN HYDROCHLORIDE 60 MG/1
60 CAPSULE, DELAYED RELEASE ORAL DAILY
Qty: 30 CAPSULE | Refills: 11
Start: 2023-08-21 | End: 2023-12-18 | Stop reason: DRUGHIGH

## 2023-08-21 RX ORDER — DICLOFENAC SODIUM AND MISOPROSTOL 75; 200 MG/1; UG/1
1 TABLET, DELAYED RELEASE ORAL DAILY
Qty: 90 TABLET | Refills: 3 | Status: SHIPPED | OUTPATIENT
Start: 2023-08-21 | End: 2023-12-05 | Stop reason: WASHOUT

## 2023-08-21 RX ORDER — AMLODIPINE BESYLATE 10 MG/1
10 TABLET ORAL DAILY
Qty: 90 TABLET | Refills: 3 | Status: SHIPPED | OUTPATIENT
Start: 2023-08-21 | End: 2024-08-20

## 2023-08-21 NOTE — ASSESSMENT & PLAN NOTE
Continue current medication and weaning process per Pain Mgt  Start diclofenac-misoprostol in afternoon as directed  - Risks, benefits and side effects reviewed with patient.

## 2023-08-28 DIAGNOSIS — K66.8 CYSTIC LESION OF ABDOMINAL VISCERA: Primary | ICD-10-CM

## 2023-09-07 VITALS — HEIGHT: 66 IN | BODY MASS INDEX: 24.27 KG/M2 | WEIGHT: 151.01 LBS

## 2023-09-08 ENCOUNTER — TELEPHONE (OUTPATIENT)
Dept: PRIMARY CARE | Facility: CLINIC | Age: 78
End: 2023-09-08
Payer: MEDICARE

## 2023-09-08 DIAGNOSIS — F41.9 ACUTE ANXIETY: ICD-10-CM

## 2023-09-08 RX ORDER — DIAZEPAM 5 MG/1
5 TABLET ORAL ONCE AS NEEDED
Qty: 2 TABLET | Refills: 0 | Status: SHIPPED | OUTPATIENT
Start: 2023-09-08 | End: 2023-12-05 | Stop reason: WASHOUT

## 2023-09-08 NOTE — TELEPHONE ENCOUNTER
Patient called in about her Diazepam. The patient was at the pharmacy when she called in I heard the pharmacist state in the background that the prescription for Diazepam was not received.    Can we send this over to the [facility] in [place]?

## 2023-09-08 NOTE — TELEPHONE ENCOUNTER
[facility] in [place] calling for clarification on directions for this medication. States to take in once.     2 tablets prescribed    Voice mail message

## 2023-09-13 LAB
CALCIUM OXALATE CRYSTALS, URINE: ABNORMAL /HPF
HYALINE CASTS, URINE: ABNORMAL /LPF
MUCUS, URINE: ABNORMAL /LPF
RBC, URINE: 10 /HPF (ref 0–5)
RENAL EPITHELIAL CELLS, URINE: <1 /HPF
SQUAMOUS EPITHELIAL CELLS, URINE: <1 /HPF
WBC, URINE: 2 /HPF (ref 0–5)

## 2023-09-21 ENCOUNTER — TELEPHONE (OUTPATIENT)
Dept: PRIMARY CARE | Facility: CLINIC | Age: 78
End: 2023-09-21
Payer: MEDICARE

## 2023-09-21 LAB — ALCOHOL (MG/DL) IN URINE: <10 MG/DL

## 2023-09-21 NOTE — TELEPHONE ENCOUNTER
Please notify patient MRI abdomen is approved  She does not need and MRI pelvis - the area of concern should be completely visualized by the MRI abdomen  I will let her know as soon as I get the results  Ignore prior message on this patient

## 2023-09-22 DIAGNOSIS — K86.2 PANCREATIC CYST (HHS-HCC): Primary | ICD-10-CM

## 2023-09-26 LAB
BUPRENORPHINE ,URINE: <2 NG/ML
BUPRENORPHINE GLUC, URINE: <5 NG/ML
NALOXONE, URINE: <100 NG/ML
NORBUPRENORPHINE GLUC,URINE: <5 NG/ML
NORBUPRENORPHINE, URINE: <2 NG/ML

## 2023-09-27 LAB
6-ACETYLMORPHINE: <25 NG/ML
7-AMINOCLONAZEPAM: <25 NG/ML
ALPHA-HYDROXYALPRAZOLAM: <25 NG/ML
ALPHA-HYDROXYMIDAZOLAM: <25 NG/ML
ALPRAZOLAM: <25 NG/ML
AMPHETAMINE (PRESENCE) IN URINE BY SCREEN METHOD: ABNORMAL
BARBITURATES PRESENCE IN URINE BY SCREEN METHOD: ABNORMAL
CANNABINOIDS IN URINE BY SCREEN METHOD: ABNORMAL
CHLORDIAZEPOXIDE: <25 NG/ML
CLONAZEPAM: <25 NG/ML
COCAINE (PRESENCE) IN URINE BY SCREEN METHOD: ABNORMAL
CODEINE: <50 NG/ML
CREATINE, URINE FOR DRUG: 120.4 MG/DL
DIAZEPAM: <25 NG/ML
DRUG SCREEN COMMENT URINE: ABNORMAL
EDDP: <25 NG/ML
FENTANYL CONFIRMATION, URINE: <2.5 NG/ML
HYDROCODONE: <25 NG/ML
HYDROMORPHONE: <25 NG/ML
LORAZEPAM: <25 NG/ML
METHADONE CONFIRMATION,URINE: <25 NG/ML
MIDAZOLAM: <25 NG/ML
MORPHINE URINE: <50 NG/ML
N-DESMETHYLTAPENTADOL: <25 NG/ML
NORDIAZEPAM: <25 NG/ML
NORFENTANYL: <2.5 NG/ML
NORHYDROCODONE: <25 NG/ML
NOROXYCODONE: >1000 NG/ML
O-DESMETHYLTRAMADOL: <50 NG/ML
OXAZEPAM: <25 NG/ML
OXYCODONE: 1856 NG/ML
OXYMORPHONE: 333 NG/ML
PHENCYCLIDINE (PRESENCE) IN URINE BY SCREEN METHOD: ABNORMAL
TAPENTADOL: <25 NG/ML
TEMAZEPAM: <25 NG/ML
TRAMADOL: <50 NG/ML
ZOLPIDEM METABOLITE (ZCA): <25 NG/ML
ZOLPIDEM: <25 NG/ML

## 2023-10-17 ENCOUNTER — OFFICE VISIT (OUTPATIENT)
Dept: UROLOGY | Facility: CLINIC | Age: 78
End: 2023-10-17
Payer: MEDICARE

## 2023-10-17 DIAGNOSIS — M54.16 LUMBAR RADICULOPATHY: ICD-10-CM

## 2023-10-17 DIAGNOSIS — M48.061 SPINAL STENOSIS OF LUMBAR REGION, UNSPECIFIED WHETHER NEUROGENIC CLAUDICATION PRESENT: ICD-10-CM

## 2023-10-17 DIAGNOSIS — N32.81 OAB (OVERACTIVE BLADDER): Primary | ICD-10-CM

## 2023-10-17 DIAGNOSIS — M47.816 LUMBAR SPONDYLOSIS: ICD-10-CM

## 2023-10-17 LAB
POC APPEARANCE, URINE: CLEAR
POC BILIRUBIN, URINE: NEGATIVE
POC BLOOD, URINE: ABNORMAL
POC COLOR, URINE: YELLOW
POC GLUCOSE, URINE: NEGATIVE MG/DL
POC KETONES, URINE: NEGATIVE MG/DL
POC LEUKOCYTES, URINE: NEGATIVE
POC NITRITE,URINE: NEGATIVE
POC PH, URINE: 5.5 PH
POC PROTEIN, URINE: NEGATIVE MG/DL
POC UROBILINOGEN, URINE: 0.2 EU/DL

## 2023-10-17 PROCEDURE — 1159F MED LIST DOCD IN RCRD: CPT | Performed by: STUDENT IN AN ORGANIZED HEALTH CARE EDUCATION/TRAINING PROGRAM

## 2023-10-17 PROCEDURE — 99205 OFFICE O/P NEW HI 60 MIN: CPT | Performed by: STUDENT IN AN ORGANIZED HEALTH CARE EDUCATION/TRAINING PROGRAM

## 2023-10-17 PROCEDURE — 1160F RVW MEDS BY RX/DR IN RCRD: CPT | Performed by: STUDENT IN AN ORGANIZED HEALTH CARE EDUCATION/TRAINING PROGRAM

## 2023-10-17 PROCEDURE — 1036F TOBACCO NON-USER: CPT | Performed by: STUDENT IN AN ORGANIZED HEALTH CARE EDUCATION/TRAINING PROGRAM

## 2023-10-17 RX ORDER — MIRABEGRON 50 MG/1
50 TABLET, EXTENDED RELEASE ORAL DAILY
Qty: 30 TABLET | Refills: 3 | Status: CANCELLED | OUTPATIENT
Start: 2023-10-17 | End: 2024-01-15

## 2023-10-17 RX ORDER — OXYCODONE HYDROCHLORIDE 5 MG/1
5 TABLET ORAL 2 TIMES DAILY PRN
Qty: 60 TABLET | Refills: 0 | Status: SHIPPED | OUTPATIENT
Start: 2023-10-23 | End: 2023-11-16 | Stop reason: SDUPTHER

## 2023-10-17 NOTE — TELEPHONE ENCOUNTER
Requested refill for Oxycodone 5mg BID #60 for 30 days. Per last note ok to continue at this dose. OARRS verified LFD: 9/23/23. LV: 9/21/23. NV: 12/18/23. Next SD: 10/23/23. [facility]wa Waddell.

## 2023-10-17 NOTE — LETTER
October 18, 2023     Reanna Martinez MD  [address]  [facility], [address] [place] [id number]    Patient: Robyn Madera   YOB: 1945   Date of Visit: 10/17/2023       Dear Dr. Renana Martinez MD:    Thank you for referring Robyn Madera to me for evaluation. Below are my notes for this consultation.  If you have questions, please do not hesitate to call me. I look forward to following your patient along with you.       Sincerely,     Gino Haas MD      CC: No Recipients  ______________________________________________________________________________________    78 y.o. presenting for a new patient visit for complaint of nocturia. She doesn't expect to be 100% better but would like to be able to sleep again without having to wake up to empty her bladder numerous times a night. Wants to know if trying myrbetriq would help if she stopped taking Gemtesa. She does not want to consider pacemaker or Botox. She also will be switching to [employer] in December and she is worried that she'll have to completely switch everything she is on because of the change.    Plan: OAB: Stay on Gemtesa for 3 more weeks and see how you feel after one week, if symptoms begin worsening start taking myrbetriq instead of Gemtesa. These prescriptions cannot be taken at the same time. Discussed pacemaker and Botox as possibilities again and the positives that can come from these different options. Follow-up in 6-8 weeks to discuss change in meds.    Scribe Attestation  By signing my name below, ISaida Scribe   attest that this documentation has been prepared under the direction and in the presence of Gino Haas MD.        PCP  Reanna Martinez MD         CHIEF COMPLAINT:  Nocturia         HISTORY OF PRESENT ILLNESS:  78 y.o. presenting for a new patient visit for complaint of nocturia. She doesn't expect to be 100% better but would like to be able to sleep again without having to  wake up to empty her bladder numerous times a night. Wants to know if trying myrbetriq would help if she stopped taking Gemtesa. She does not want to consider pacemaker or Botox. She also will be switching to [employer] in December and she is worried that she'll have to completely switch everything she is on because of the change.                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 (10/29/2014), Disease of thyroid gland (2018), Diverticulosis of colon (06/17/2013), Diverticulosis of intestine, part unspecified, without perforation or abscess without bleeding, Gastric polyp (06/23/2014), GERD (gastroesophageal reflux disease) (2008), Glaucoma suspect, both eyes (01/08/2015), HL (hearing loss) (2022), squamous cell carcinoma of skin (06/09/2015), Hypertension (2015), Internal hemorrhoid (03/21/2016), Low TSH level (03/10/2023), Lung nodule, solitary (03/10/2023), Lung nodule, solitary (03/10/2023), Mild intermittent asthma without complication (02/24/2016), Other microscopic hematuria (03/01/2018), Other specified abnormal findings of blood chemistry (10/19/2021), Other specified abnormal findings of blood chemistry (10/19/2021), Pain in right knee, Personal history of other diseases of the digestive system, Personal history of other diseases of the musculoskeletal system and connective tissue (01/17/2020), Personal history of other diseases of the musculoskeletal system and connective tissue, Personal history of other diseases of the musculoskeletal system and connective tissue, Personal history of other diseases of the nervous system and sense organs, Personal history of other diseases of the nervous system and sense organs,  "Personal history of other diseases of the respiratory system, Personal history of other endocrine, nutritional and metabolic disease, Personal history of other endocrine, nutritional and metabolic disease, Personal history of other endocrine, nutritional and metabolic disease, Personal history of other malignant neoplasm of skin, Personal history of other mental and behavioral disorders, Presence of right artificial knee joint (09/21/2017), Scoliosis (2008), Visual impairment (2008), and Wrist sprain (03/10/2023).    Surgical History  She has a past surgical history that includes Knee arthroscopy w/ debridement (07/09/2016); Other surgical history (10/14/2019); and Joint replacement (2017-18).     Social History  She reports that she has never smoked. She has never used smokeless tobacco. She reports that she does not currently use alcohol. She reports that she does not use drugs.    Family History  Family History   Problem Relation Name Age of Onset   • Diabetes Mother Jaci Bingham         Allergies  House dust, House dust mite, Adhesive tape-silicones, Cat dander, Penicillins, and Adhesive      A comprehensive 10+ review of systems was negative except for: See HPI                   PHYSICAL EXAMINATION:  BP Readings from Last 3 Encounters:   08/21/23 136/75   07/13/23 127/75   06/23/23 159/79      Wt Readings from Last 3 Encounters:   08/21/23 69.9 kg (154 lb)   07/13/23 68.9 kg (152 lb)   06/23/23 68.8 kg (151 lb 9.6 oz)      BMI: Estimated body mass index is 24.86 kg/m² as calculated from the following:    Height as of 8/21/23: 1.676 m (5' 6\").    Weight as of 8/21/23: 69.9 kg (154 lb).  BSA: Estimated body surface area is 1.8 meters squared as calculated from the following:    Height as of 8/21/23: 1.676 m (5' 6\").    Weight as of 8/21/23: 69.9 kg (154 lb).  HEENT: Normocephalic, atraumatic, PER EOMI, nonicteric, trachea normal, thyroid normal, oropharynx normal.  CARDIAC: regular rate & rhythm, S1 & S2 " normal.  No heaves, thrills, gallops or murmurs.  LUNGS: Clear to auscultation, no spinal or CV tenderness.  EXTREMITIES: No evidence of cyanosis, clubbing or edema.              IMPRESSION AND PLAN:  77 yo with microscopic hematuria and OAB     Nocturia/OAB  -improved with gemtesa, but too expensive, wants to stop and see if she needs anything, if sx return will try myrbetriq    We discussed botox vs sacral neuromodulation: both have similar efficacy 80% patients reports >50% improvement, botox associated with 5% risk of incomplete emptying, increase in UTI and will require re-injection in 6-9 months; and as early as 3 months. SNM is a staged procedure, 2 weeks apart, consisting first of lead implantation then internalization of IPG if there is improvement. Interstim is associated with lead migration, explantation, infection and bleeding, though risks are all <5%. We also discussed PTNS which is associated with success rates comparable to medical therapy but without side-effects without significant major morbidity.    Microhematuria:   Negative work-up, repeat UA 10/20/2024    All questions and concerns were answered and addressed.  The patient expressed understanding and agrees with the plan.     10/17/2023     Scribe Attestation  By signing my name below, I, Saida Singh , Rusty   attest that this documentation has been prepared under the direction and in the presence of Gino Haas MD.

## 2023-10-17 NOTE — PROGRESS NOTES
PCP  Reanna Martinez MD         CHIEF COMPLAINT:  Nocturia         HISTORY OF PRESENT ILLNESS:  78 y.o. presenting for a new patient visit for complaint of nocturia. She doesn't expect to be 100% better but would like to be able to sleep again without having to wake up to empty her bladder numerous times a night. Wants to know if trying myrbetriq would help if she stopped taking Gemtesa. She does not want to consider pacemaker or Botox. She also will be switching to [employer] in December and she is worried that she'll have to completely switch everything she is on because of the change.                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 (10/29/2014), Disease of thyroid gland (2018), Diverticulosis of colon (06/17/2013), Diverticulosis of intestine, part unspecified, without perforation or abscess without bleeding, Gastric polyp (06/23/2014), GERD (gastroesophageal reflux disease) (2008), Glaucoma suspect, both eyes (01/08/2015), HL (hearing loss) (2022), squamous cell carcinoma of skin (06/09/2015), Hypertension (2015), Internal hemorrhoid (03/21/2016), Low TSH level (03/10/2023), Lung nodule, solitary (03/10/2023), Lung nodule, solitary (03/10/2023), Mild intermittent asthma without complication (02/24/2016), Other microscopic hematuria (03/01/2018), Other specified abnormal findings of blood chemistry (10/19/2021), Other specified abnormal findings of blood chemistry (10/19/2021), Pain in right knee, Personal history of other diseases of the digestive system, Personal history of other diseases of the musculoskeletal system and connective tissue (01/17/2020), Personal history of other diseases of the musculoskeletal  "system and connective tissue, Personal history of other diseases of the musculoskeletal system and connective tissue, Personal history of other diseases of the nervous system and sense organs, Personal history of other diseases of the nervous system and sense organs, Personal history of other diseases of the respiratory system, Personal history of other endocrine, nutritional and metabolic disease, Personal history of other endocrine, nutritional and metabolic disease, Personal history of other endocrine, nutritional and metabolic disease, Personal history of other malignant neoplasm of skin, Personal history of other mental and behavioral disorders, Presence of right artificial knee joint (09/21/2017), Scoliosis (2008), Visual impairment (2008), and Wrist sprain (03/10/2023).    Surgical History  She has a past surgical history that includes Knee arthroscopy w/ debridement (07/09/2016); Other surgical history (10/14/2019); and Joint replacement (2017-18).     Social History  She reports that she has never smoked. She has never used smokeless tobacco. She reports that she does not currently use alcohol. She reports that she does not use drugs.    Family History  Family History   Problem Relation Name Age of Onset    Diabetes Mother Jaci Bingham         Allergies  House dust, House dust mite, Adhesive tape-silicones, Cat dander, Penicillins, and Adhesive      A comprehensive 10+ review of systems was negative except for: See HPI                   PHYSICAL EXAMINATION:  BP Readings from Last 3 Encounters:   08/21/23 136/75   07/13/23 127/75   06/23/23 159/79      Wt Readings from Last 3 Encounters:   08/21/23 69.9 kg (154 lb)   07/13/23 68.9 kg (152 lb)   06/23/23 68.8 kg (151 lb 9.6 oz)      BMI: Estimated body mass index is 24.86 kg/m² as calculated from the following:    Height as of 8/21/23: 1.676 m (5' 6\").    Weight as of 8/21/23: 69.9 kg (154 lb).  BSA: Estimated body surface area is 1.8 meters squared as " "calculated from the following:    Height as of 8/21/23: 1.676 m (5' 6\").    Weight as of 8/21/23: 69.9 kg (154 lb).  HEENT: Normocephalic, atraumatic, PER EOMI, nonicteric, trachea normal, thyroid normal, oropharynx normal.  CARDIAC: regular rate & rhythm, S1 & S2 normal.  No heaves, thrills, gallops or murmurs.  LUNGS: Clear to auscultation, no spinal or CV tenderness.  EXTREMITIES: No evidence of cyanosis, clubbing or edema.              IMPRESSION AND PLAN:  77 yo with microscopic hematuria and OAB     Nocturia/OAB  -improved with gemtesa, but too expensive, wants to stop and see if she needs anything, if sx return will try myrbetriq    We discussed botox vs sacral neuromodulation: both have similar efficacy 80% patients reports >50% improvement, botox associated with 5% risk of incomplete emptying, increase in UTI and will require re-injection in 6-9 months; and as early as 3 months. SNM is a staged procedure, 2 weeks apart, consisting first of lead implantation then internalization of IPG if there is improvement. Interstim is associated with lead migration, explantation, infection and bleeding, though risks are all <5%. We also discussed PTNS which is associated with success rates comparable to medical therapy but without side-effects without significant major morbidity.    Microhematuria:   Negative work-up, repeat UA 10/20/2024    All questions and concerns were answered and addressed.  The patient expressed understanding and agrees with the plan.     10/17/2023     Scribe Attestation  By signing my name below, I, Rusty Howard   attest that this documentation has been prepared under the direction and in the presence of Gino Haas MD.    "

## 2023-10-24 ENCOUNTER — DOCUMENTATION (OUTPATIENT)
Dept: UROLOGY | Facility: CLINIC | Age: 78
End: 2023-10-24
Payer: MEDICARE

## 2023-10-24 NOTE — PROGRESS NOTES
Patient given 6 weeks of Gemtesa Lot number   [id number] EXP 10/26 and 4 weeks Myrbetriq Lot number [id number] EX{ 02/26 per

## 2023-11-13 NOTE — PROGRESS NOTES
"Counseling:  The patient was counseled regarding diagnostic results, instructions for management, risk factor reductions, prognosis, patient and family education, impressions, risks and benefits of treatment options and importance of compliance with treatment.      Chief Complaint:   The patient presents today for annual followup of HTN and abnormal CT cardiac score.     History Of Present Illness:    Robyn Madera is a 78 year old female patient who presents today for annual followup of HTN and abnormal CT calcium score. Her PMH is significant for hyperlipidemia, hyperthyroidism, abnormal CT calcium score, asymmetric SNHL, GERD, Graves disease and RBBB. Over the past year, the patient states that she has done well from a cardiac standpoint. She denies any CP, chest discomfort or SOB. She indicates that her new pain management practitioner has weaned her off OxyContin and is working on weaning her off oxycodone, and with that her blood pressure has been in the 130-140 systolic range. EKG today is stable with no acute change. The patient is compliant with her prescribed medications.    Last Recorded Vitals:  Vitals:    11/14/23 1306   BP: 138/72   BP Location: Right arm   Pulse: 92   Weight: 70.8 kg (156 lb)   Height: 1.676 m (5' 6\")       Past Surgical History:  She has a past surgical history that includes Knee arthroscopy w/ debridement (07/09/2016); Other surgical history (10/14/2019); and Joint replacement (2017-18).      Social History:  She reports that she has never smoked. She has never used smokeless tobacco. She reports that she does not currently use alcohol. She reports that she does not use drugs.    Family History:  Family History   Problem Relation Name Age of Onset    Diabetes Mother Jaci Bingham         Allergies:  House dust, House dust mite, Adhesive tape-silicones, Cat dander, Penicillins, and Adhesive    Outpatient Medications:  Current Outpatient Medications   Medication Instructions    " acetaminophen (Tylenol) 500 mg tablet 1 tablet, oral, 3 times daily    amLODIPine (NORVASC) 10 mg, oral, Daily    aspirin 81 mg EC tablet 1 tablet, oral, Daily    atorvastatin (LIPITOR) 40 mg, oral, Nightly    BACILLUS COAGULANS-INULIN ORAL oral    busPIRone (BUSPAR) 15 mg, oral, 2 times daily    calcium carbonate-vitamin D3 500 mg-3.125 mcg (125 unit) tablet tablet oral    cholecalciferol (Vitamin D-3) 25 MCG (1000 UT) capsule oral    diazePAM (VALIUM) 5 mg, oral, Once as needed    diclofenac-misoprostoL (Arthrotec 75)  mg-mcg EC tablet 1 tablet, oral, Daily, Do not crush, chew, or split.    docusate sodium (Colace) 100 mg capsule oral, 2 times daily    DULoxetine (CYMBALTA) 60 mg, oral, Daily, Do not crush or chew.    esomeprazole (NexIUM) 20 mg DR capsule 1 capsule, oral, Daily    gabapentin (NEURONTIN) 300 mg, oral, 3 times daily    ketoconazole (NIZORAL) 2 %, Topical, As needed    L. acidophilus/Bifid. animalis 15.5 billion cell capsule oral    lactase (Lactaid) 9,000 unit tablet oral    naloxone (Narcan) 4 mg/0.1 mL nasal spray nasal    oxyCODONE (ROXICODONE) 5 mg, oral, 2 times daily PRN    polyethylene glycol (Glycolax) 17 gram/dose powder oral, Every other day    vibegron 75 mg tablet 1 tablet, oral, Every evening, Take 2 hours before bedtime      Review of Systems   All other systems reviewed and are negative.     Physical Exam:  Constitutional:       Appearance: Healthy appearance. Not in distress.   Neck:      Vascular: No JVR. JVD normal.   Pulmonary:      Effort: Pulmonary effort is normal.      Breath sounds: Normal breath sounds. No wheezing. No rhonchi. No rales.   Chest:      Chest wall: Not tender to palpatation.   Cardiovascular:      PMI at left midclavicular line. Normal rate. Regular rhythm. Normal S1. Normal S2.       Murmurs: There is no murmur.      No gallop.  No click. No rub.   Pulses:     Intact distal pulses.   Edema:     Peripheral edema absent.   Abdominal:      General: Bowel  sounds are normal.      Palpations: Abdomen is soft.      Tenderness: There is no abdominal tenderness.   Musculoskeletal: Normal range of motion.         General: No tenderness. Skin:     General: Skin is warm and dry.   Neurological:      General: No focal deficit present.      Mental Status: Alert and oriented to person, place and time.          Last Labs:  CBC -  Lab Results   Component Value Date    WBC 6.2 08/25/2023    HGB 14.3 08/25/2023    HCT 42.3 08/25/2023    MCV 95 08/25/2023     08/25/2023       CMP -  Lab Results   Component Value Date    CALCIUM 9.3 08/25/2023    PROT 7.6 08/14/2023    ALBUMIN 4.5 08/14/2023    AST 24 08/14/2023    ALT 19 08/14/2023    ALKPHOS 69 08/14/2023    BILITOT 0.9 08/14/2023       LIPID PANEL -   Lab Results   Component Value Date    CHOL 165 08/14/2023    TRIG 79 08/14/2023    HDL 96.1 08/14/2023    CHHDL 1.7 08/14/2023    LDLF 53 08/14/2023    VLDL 16 08/14/2023       RENAL FUNCTION PANEL -   Lab Results   Component Value Date    GLUCOSE 121 (H) 08/25/2023     08/25/2023    K 3.7 08/25/2023     08/25/2023    CO2 27 08/25/2023    ANIONGAP 13 08/25/2023    BUN 18 08/25/2023    CREATININE 0.74 08/25/2023    CALCIUM 9.3 08/25/2023    ALBUMIN 4.5 08/14/2023        Lab Results   Component Value Date    HGBA1C 6.5 (A) 08/14/2023       Last Cardiology Tests:  07/02/2021 - NM Cardiac Stress Test  1. Normal stress myocardial perfusion imaging in response to pharmacologic stress. Calculated ejection fraction of 59% without segmental wall motion abnormality seen.  2. No clinical or electrocardiographic evidence for ischemia at maximal infusion.      02/21/2020 - CT Chest  Mild atherosclerotic calcification of thoracic aorta. No aneurysm. Heart is normal in size. Moderate coronary artery atherosclerotic calcifications. No pleural effusion The imaged upper abdomen is normal in CT appearance. There are degenerative changes in the spine. Chest wall is intact. There are  no suspicious osseous lesions. 1.2 x 0.5 cm lobulated nodule in the lingula is stable. No new suspicious pulmonary nodules seen.     11/07/2019 - Dobutamine Stress Echo  1. The resting ejection fraction was estimated at 55-60% with a peak exercise ejection fraction estimated at 70-75%.  2. Normal global left ventricular systolic function.  3. Abnormal stress test.  4. ECG changes consistent with ischemia.  5. There were no stress-induced wall motion abnormalities. This is a negative stress echo test for ischemia.      08/22/2019 - CT Cardiac Score  1. Coronary artery calcium score of 597*.   2. 9 x 5 mm nodule in the lingula. Consider surveillance CT chest in about 6 months to document stability.    Assessment/Plan   1) Abnormal CT Calcium Score  On atorvastatin 40 mg daily  Negative Nuclear Stress test  Counselled about secondary risk factor modification keeping LDL 60-70  Lipid panel 08/14/2023 with LDL of 53; at goal  Doing well - denies CP, chest discomfort or SOB  Check repeat CT calcium score   F/U 1 year      2) HTN  On Amlodipine 10 mg daily   Per the patient, her new pain management practitioner has weaned her off OxyContin and is working on weaning her off oxycodone.  Since this change, her BP is in the 130-140 systolic range      Scribe Attestation  By signing my name below, I, Keyla Gonzalez Scralessandro   attest that this documentation has been prepared under the direction and in the presence of Carl William MD.

## 2023-11-14 ENCOUNTER — OFFICE VISIT (OUTPATIENT)
Dept: CARDIOLOGY | Facility: CLINIC | Age: 78
End: 2023-11-14
Payer: MEDICARE

## 2023-11-14 VITALS
DIASTOLIC BLOOD PRESSURE: 72 MMHG | WEIGHT: 156 LBS | HEART RATE: 92 BPM | BODY MASS INDEX: 25.07 KG/M2 | SYSTOLIC BLOOD PRESSURE: 138 MMHG | HEIGHT: 66 IN

## 2023-11-14 DIAGNOSIS — R93.1 AGATSTON CAC SCORE, >400: Primary | ICD-10-CM

## 2023-11-14 PROCEDURE — 1036F TOBACCO NON-USER: CPT | Performed by: INTERNAL MEDICINE

## 2023-11-14 PROCEDURE — 99213 OFFICE O/P EST LOW 20 MIN: CPT | Performed by: INTERNAL MEDICINE

## 2023-11-14 PROCEDURE — 3075F SYST BP GE 130 - 139MM HG: CPT | Performed by: INTERNAL MEDICINE

## 2023-11-14 PROCEDURE — 1160F RVW MEDS BY RX/DR IN RCRD: CPT | Performed by: INTERNAL MEDICINE

## 2023-11-14 PROCEDURE — 1159F MED LIST DOCD IN RCRD: CPT | Performed by: INTERNAL MEDICINE

## 2023-11-14 PROCEDURE — 93000 ELECTROCARDIOGRAM COMPLETE: CPT | Performed by: INTERNAL MEDICINE

## 2023-11-14 PROCEDURE — 3078F DIAST BP <80 MM HG: CPT | Performed by: INTERNAL MEDICINE

## 2023-11-14 ASSESSMENT — ENCOUNTER SYMPTOMS
OCCASIONAL FEELINGS OF UNSTEADINESS: 1
LOSS OF SENSATION IN FEET: 0
DEPRESSION: 0

## 2023-11-14 NOTE — PATIENT INSTRUCTIONS
Continue all current medications as prescribed.   Dr. William has ordered a repeat CT calcium score. You will be notified of the results once they become available.  Followup with Dr. William in 1 year, sooner should any issues or concerns arise before then.     If you have any questions or cardiac concerns, please call our office at [phone].

## 2023-11-14 NOTE — LETTER
"November 14, 2023     Reanna Martinez MD  [address]  [facility] [place] [facility], [address] [place] [id number]    Patient: Robyn Madera   YOB: 1945   Date of Visit: 11/14/2023       Dear Dr. Reanna Martinez MD:    Thank you for referring Robyn Madera to me for evaluation. Below are my notes for this consultation.  If you have questions, please do not hesitate to call me. I look forward to following your patient along with you.       Sincerely,     Carl William MD      CC: No Recipients  ______________________________________________________________________________________    Counseling:  The patient was counseled regarding diagnostic results, instructions for management, risk factor reductions, prognosis, patient and family education, impressions, risks and benefits of treatment options and importance of compliance with treatment.      Chief Complaint:   The patient presents today for annual followup of HTN and abnormal CT cardiac score.     History Of Present Illness:    Robyn Madera is a 78 year old female patient who presents today for annual followup of HTN and abnormal CT calcium score. Her PMH is significant for hyperlipidemia, hyperthyroidism, abnormal CT calcium score, asymmetric SNHL, GERD, Graves disease and RBBB. Over the past year, the patient states that she has done well from a cardiac standpoint. She denies any CP, chest discomfort or SOB. She indicates that her new pain management practitioner has weaned her off OxyContin and is working on weaning her off oxycodone, and with that her blood pressure has been in the 130-140 systolic range. EKG today is stable with no acute change. The patient is compliant with her prescribed medications.    Last Recorded Vitals:  Vitals:    11/14/23 1306   BP: 138/72   BP Location: Right arm   Pulse: 92   Weight: 70.8 kg (156 lb)   Height: 1.676 m (5' 6\")       Past Surgical History:  She has a past surgical history that " includes Knee arthroscopy w/ debridement (07/09/2016); Other surgical history (10/14/2019); and Joint replacement (2017-18).      Social History:  She reports that she has never smoked. She has never used smokeless tobacco. She reports that she does not currently use alcohol. She reports that she does not use drugs.    Family History:  Family History   Problem Relation Name Age of Onset   • Diabetes Mother Jaci Bingham         Allergies:  House dust, House dust mite, Adhesive tape-silicones, Cat dander, Penicillins, and Adhesive    Outpatient Medications:  Current Outpatient Medications   Medication Instructions   • acetaminophen (Tylenol) 500 mg tablet 1 tablet, oral, 3 times daily   • amLODIPine (NORVASC) 10 mg, oral, Daily   • aspirin 81 mg EC tablet 1 tablet, oral, Daily   • atorvastatin (LIPITOR) 40 mg, oral, Nightly   • BACILLUS COAGULANS-INULIN ORAL oral   • busPIRone (BUSPAR) 15 mg, oral, 2 times daily   • calcium carbonate-vitamin D3 500 mg-3.125 mcg (125 unit) tablet tablet oral   • cholecalciferol (Vitamin D-3) 25 MCG (1000 UT) capsule oral   • diazePAM (VALIUM) 5 mg, oral, Once as needed   • diclofenac-misoprostoL (Arthrotec 75)  mg-mcg EC tablet 1 tablet, oral, Daily, Do not crush, chew, or split.   • docusate sodium (Colace) 100 mg capsule oral, 2 times daily   • DULoxetine (CYMBALTA) 60 mg, oral, Daily, Do not crush or chew.   • esomeprazole (NexIUM) 20 mg DR capsule 1 capsule, oral, Daily   • gabapentin (NEURONTIN) 300 mg, oral, 3 times daily   • ketoconazole (NIZORAL) 2 %, Topical, As needed   • L. acidophilus/Bifid. animalis 15.5 billion cell capsule oral   • lactase (Lactaid) 9,000 unit tablet oral   • naloxone (Narcan) 4 mg/0.1 mL nasal spray nasal   • oxyCODONE (ROXICODONE) 5 mg, oral, 2 times daily PRN   • polyethylene glycol (Glycolax) 17 gram/dose powder oral, Every other day   • vibegron 75 mg tablet 1 tablet, oral, Every evening, Take 2 hours before bedtime      Review of Systems    All other systems reviewed and are negative.     Physical Exam:  Constitutional:       Appearance: Healthy appearance. Not in distress.   Neck:      Vascular: No JVR. JVD normal.   Pulmonary:      Effort: Pulmonary effort is normal.      Breath sounds: Normal breath sounds. No wheezing. No rhonchi. No rales.   Chest:      Chest wall: Not tender to palpatation.   Cardiovascular:      PMI at left midclavicular line. Normal rate. Regular rhythm. Normal S1. Normal S2.       Murmurs: There is no murmur.      No gallop.  No click. No rub.   Pulses:     Intact distal pulses.   Edema:     Peripheral edema absent.   Abdominal:      General: Bowel sounds are normal.      Palpations: Abdomen is soft.      Tenderness: There is no abdominal tenderness.   Musculoskeletal: Normal range of motion.         General: No tenderness. Skin:     General: Skin is warm and dry.   Neurological:      General: No focal deficit present.      Mental Status: Alert and oriented to person, place and time.          Last Labs:  CBC -  Lab Results   Component Value Date    WBC 6.2 08/25/2023    HGB 14.3 08/25/2023    HCT 42.3 08/25/2023    MCV 95 08/25/2023     08/25/2023       CMP -  Lab Results   Component Value Date    CALCIUM 9.3 08/25/2023    PROT 7.6 08/14/2023    ALBUMIN 4.5 08/14/2023    AST 24 08/14/2023    ALT 19 08/14/2023    ALKPHOS 69 08/14/2023    BILITOT 0.9 08/14/2023       LIPID PANEL -   Lab Results   Component Value Date    CHOL 165 08/14/2023    TRIG 79 08/14/2023    HDL 96.1 08/14/2023    CHHDL 1.7 08/14/2023    LDLF 53 08/14/2023    VLDL 16 08/14/2023       RENAL FUNCTION PANEL -   Lab Results   Component Value Date    GLUCOSE 121 (H) 08/25/2023     08/25/2023    K 3.7 08/25/2023     08/25/2023    CO2 27 08/25/2023    ANIONGAP 13 08/25/2023    BUN 18 08/25/2023    CREATININE 0.74 08/25/2023    CALCIUM 9.3 08/25/2023    ALBUMIN 4.5 08/14/2023        Lab Results   Component Value Date    HGBA1C 6.5 (A)  08/14/2023       Last Cardiology Tests:  07/02/2021 - NM Cardiac Stress Test  1. Normal stress myocardial perfusion imaging in response to pharmacologic stress. Calculated ejection fraction of 59% without segmental wall motion abnormality seen.  2. No clinical or electrocardiographic evidence for ischemia at maximal infusion.      02/21/2020 - CT Chest  Mild atherosclerotic calcification of thoracic aorta. No aneurysm. Heart is normal in size. Moderate coronary artery atherosclerotic calcifications. No pleural effusion The imaged upper abdomen is normal in CT appearance. There are degenerative changes in the spine. Chest wall is intact. There are no suspicious osseous lesions. 1.2 x 0.5 cm lobulated nodule in the lingula is stable. No new suspicious pulmonary nodules seen.     11/07/2019 - Dobutamine Stress Echo  1. The resting ejection fraction was estimated at 55-60% with a peak exercise ejection fraction estimated at 70-75%.  2. Normal global left ventricular systolic function.  3. Abnormal stress test.  4. ECG changes consistent with ischemia.  5. There were no stress-induced wall motion abnormalities. This is a negative stress echo test for ischemia.      08/22/2019 - CT Cardiac Score  1. Coronary artery calcium score of 597*.   2. 9 x 5 mm nodule in the lingula. Consider surveillance CT chest in about 6 months to document stability.    Assessment/Plan  1) Abnormal CT Calcium Score  On atorvastatin 40 mg daily  Negative Nuclear Stress test  Counselled about secondary risk factor modification keeping LDL 60-70  Lipid panel 08/14/2023 with LDL of 53; at goal  Doing well - denies CP, chest discomfort or SOB  Check repeat CT calcium score   F/U 1 year      2) HTN  On Amlodipine 10 mg daily   Per the patient, her new pain management practitioner has weaned her off OxyContin and is working on weaning her off oxycodone.  Since this change, her BP is in the 130-140 systolic range      Scribe Attestation  By signing my  name below, I, Rusty Jacobson   attest that this documentation has been prepared under the direction and in the presence of Carl William MD.

## 2023-11-16 DIAGNOSIS — M47.816 LUMBAR SPONDYLOSIS: ICD-10-CM

## 2023-11-16 DIAGNOSIS — M54.16 LUMBAR RADICULOPATHY: ICD-10-CM

## 2023-11-16 DIAGNOSIS — M48.061 SPINAL STENOSIS OF LUMBAR REGION, UNSPECIFIED WHETHER NEUROGENIC CLAUDICATION PRESENT: ICD-10-CM

## 2023-11-16 RX ORDER — OXYCODONE HYDROCHLORIDE 5 MG/1
5 TABLET ORAL 2 TIMES DAILY PRN
Qty: 60 TABLET | Refills: 0 | Status: SHIPPED | OUTPATIENT
Start: 2023-11-22 | End: 2023-12-18 | Stop reason: SDUPTHER

## 2023-11-16 NOTE — TELEPHONE ENCOUNTER
Pt is requesting refill of  oxycodone 5 mg 1 tablet BID Awa Waddell                                                         LV: 9/21/23                   NV:  12/18/23                OARRS reviewed with [facility]:  10/23/23  #60/30 days                          Pended RX to Dr. Merchant for transmission to pharmacy.

## 2023-11-20 ENCOUNTER — LAB (OUTPATIENT)
Dept: LAB | Facility: LAB | Age: 78
End: 2023-11-20
Payer: MEDICARE

## 2023-11-20 DIAGNOSIS — I10 BENIGN ESSENTIAL HYPERTENSION: ICD-10-CM

## 2023-11-20 DIAGNOSIS — E05.90 SUBCLINICAL HYPERTHYROIDISM: ICD-10-CM

## 2023-11-20 DIAGNOSIS — Z11.59 ENCOUNTER FOR HEPATITIS C SCREENING TEST FOR LOW RISK PATIENT: ICD-10-CM

## 2023-11-20 DIAGNOSIS — R73.01 IMPAIRED FASTING GLUCOSE: ICD-10-CM

## 2023-11-20 LAB
ANION GAP SERPL CALC-SCNC: 12 MMOL/L (ref 10–20)
BUN SERPL-MCNC: 14 MG/DL (ref 6–23)
CALCIUM SERPL-MCNC: 8.9 MG/DL (ref 8.6–10.3)
CHLORIDE SERPL-SCNC: 104 MMOL/L (ref 98–107)
CO2 SERPL-SCNC: 27 MMOL/L (ref 21–32)
CREAT SERPL-MCNC: 0.69 MG/DL (ref 0.5–1.05)
EST. AVERAGE GLUCOSE BLD GHB EST-MCNC: 128 MG/DL
GFR SERPL CREATININE-BSD FRML MDRD: 90 ML/MIN/1.73M*2
GLUCOSE SERPL-MCNC: 115 MG/DL (ref 74–99)
HBA1C MFR BLD: 6.1 %
HCV AB SER QL: NONREACTIVE
POTASSIUM SERPL-SCNC: 4.2 MMOL/L (ref 3.5–5.3)
SODIUM SERPL-SCNC: 139 MMOL/L (ref 136–145)
T4 FREE SERPL-MCNC: 0.77 NG/DL (ref 0.61–1.12)
TSH SERPL-ACNC: 0.07 MIU/L (ref 0.44–3.98)

## 2023-11-20 PROCEDURE — 80048 BASIC METABOLIC PNL TOTAL CA: CPT

## 2023-11-20 PROCEDURE — 84443 ASSAY THYROID STIM HORMONE: CPT

## 2023-11-20 PROCEDURE — 36415 COLL VENOUS BLD VENIPUNCTURE: CPT

## 2023-11-20 PROCEDURE — 83036 HEMOGLOBIN GLYCOSYLATED A1C: CPT

## 2023-11-20 PROCEDURE — 84439 ASSAY OF FREE THYROXINE: CPT

## 2023-11-20 PROCEDURE — 86803 HEPATITIS C AB TEST: CPT

## 2023-11-27 ENCOUNTER — APPOINTMENT (OUTPATIENT)
Dept: PRIMARY CARE | Facility: CLINIC | Age: 78
End: 2023-11-27
Payer: MEDICARE

## 2023-11-28 ENCOUNTER — TELEPHONE (OUTPATIENT)
Dept: ENDOCRINOLOGY | Facility: CLINIC | Age: 78
End: 2023-11-28

## 2023-11-28 ENCOUNTER — OFFICE VISIT (OUTPATIENT)
Dept: UROLOGY | Facility: CLINIC | Age: 78
End: 2023-11-28
Payer: MEDICARE

## 2023-11-28 VITALS — WEIGHT: 157.2 LBS | BODY MASS INDEX: 25.37 KG/M2

## 2023-11-28 DIAGNOSIS — E05.90 HYPERTHYROIDISM: Primary | ICD-10-CM

## 2023-11-28 DIAGNOSIS — N32.81 OAB (OVERACTIVE BLADDER): Primary | ICD-10-CM

## 2023-11-28 PROCEDURE — 1159F MED LIST DOCD IN RCRD: CPT | Performed by: STUDENT IN AN ORGANIZED HEALTH CARE EDUCATION/TRAINING PROGRAM

## 2023-11-28 PROCEDURE — 1036F TOBACCO NON-USER: CPT | Performed by: STUDENT IN AN ORGANIZED HEALTH CARE EDUCATION/TRAINING PROGRAM

## 2023-11-28 PROCEDURE — 1160F RVW MEDS BY RX/DR IN RCRD: CPT | Performed by: STUDENT IN AN ORGANIZED HEALTH CARE EDUCATION/TRAINING PROGRAM

## 2023-11-28 PROCEDURE — 99213 OFFICE O/P EST LOW 20 MIN: CPT | Performed by: STUDENT IN AN ORGANIZED HEALTH CARE EDUCATION/TRAINING PROGRAM

## 2023-11-28 RX ORDER — MIRABEGRON 50 MG/1
50 TABLET, EXTENDED RELEASE ORAL DAILY
Qty: 90 TABLET | Refills: 3 | Status: CANCELLED | OUTPATIENT
Start: 2023-12-28 | End: 2024-12-27

## 2023-11-28 NOTE — TELEPHONE ENCOUNTER
States she had blood work in [place] , wanted you to review and let her know if theres any changes in her thyroid medication.

## 2023-11-28 NOTE — PROGRESS NOTES
CHIEF COMPLAINT: FUV 6 weeks medication review             HISTORY OF PRESENT ILLNESS:  This is a 78 y.o. female,  who presents with a 6 weeks follow up visit and a medication review. Patient report she is doing better with taking Myrbetriq. She is requesting a refill prescription and some trial samples for today. She only has two pills left now. The cost for her prescription is $75 - $100 for a 3-month supply depending on where she gets it. She denies having any urgency bladder symptoms. She does not have any urinary incontinence either. Patient reports she experienced having diarrhea when taking Myrbetriq for about two weeks. However, it has resolved now. She is uncertain if the medication could have caused it or not. She has tried Gemtesa already. Patient is also questioning if her lumbar stenosis condition could be upsetting her bladder symptoms.               HISTORY OF PRESENT ILLNESS:           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 (10/29/2014), Disease of thyroid gland (2018), Diverticulosis of colon (06/17/2013), Diverticulosis of intestine, part unspecified, without perforation or abscess without bleeding, Gastric polyp (06/23/2014), GERD (gastroesophageal reflux disease) (2008), Glaucoma suspect, both eyes (01/08/2015), HL (hearing loss) (2022), squamous cell carcinoma of skin (06/09/2015), Hypertension (2015), Internal hemorrhoid (03/21/2016), Low TSH level (03/10/2023), Lung nodule, solitary (03/10/2023), Lung nodule, solitary (03/10/2023), Mild intermittent asthma without complication (02/24/2016), Other microscopic hematuria (03/01/2018), Other specified abnormal findings of blood chemistry (10/19/2021),  Other specified abnormal findings of blood chemistry (10/19/2021), Pain in right knee, Personal history of other diseases of the digestive system, Personal history of other diseases of the musculoskeletal system and connective tissue (01/17/2020), Personal history of other diseases of the musculoskeletal system and connective tissue, Personal history of other diseases of the musculoskeletal system and connective tissue, Personal history of other diseases of the nervous system and sense organs, Personal history of other diseases of the nervous system and sense organs, Personal history of other diseases of the respiratory system, Personal history of other endocrine, nutritional and metabolic disease, Personal history of other endocrine, nutritional and metabolic disease, Personal history of other endocrine, nutritional and metabolic disease, Personal history of other malignant neoplasm of skin, Personal history of other mental and behavioral disorders, Presence of right artificial knee joint (09/21/2017), Scoliosis (2008), Visual impairment (2008), and Wrist sprain (03/10/2023).    Surgical History  She has a past surgical history that includes Knee arthroscopy w/ debridement (07/09/2016); Other surgical history (10/14/2019); and Joint replacement (2017-18).     Social History  She reports that she has never smoked. She has never used smokeless tobacco. She reports that she does not currently use alcohol. She reports that she does not use drugs.    Family History  Family History   Problem Relation Name Age of Onset    Diabetes Mother Jaci Bingham         Allergies  House dust, House dust mite, Adhesive tape-silicones, Cat dander, Penicillins, and Adhesive      A comprehensive 10+ review of systems was negative except for: see hpi                          PHYSICAL EXAMINATION:  BP Readings from Last 3 Encounters:   11/14/23 138/72   08/21/23 136/75   07/13/23 127/75      Wt Readings from Last 3 Encounters:   11/28/23  "71.3 kg (157 lb 3.2 oz)   11/14/23 70.8 kg (156 lb)   08/21/23 69.9 kg (154 lb)      BMI: Estimated body mass index is 25.37 kg/m² as calculated from the following:    Height as of 11/14/23: 1.676 m (5' 6\").    Weight as of this encounter: 71.3 kg (157 lb 3.2 oz).  BSA: Estimated body surface area is 1.82 meters squared as calculated from the following:    Height as of 11/14/23: 1.676 m (5' 6\").    Weight as of this encounter: 71.3 kg (157 lb 3.2 oz).  HEENT: Normocephalic, atraumatic, PER EOMI, nonicteric, trachea normal, thyroid normal, oropharynx normal.  CARDIAC: regular rate & rhythm, S1 & S2 normal.  No heaves, thrills, gallops or murmurs.  LUNGS: Clear to auscultation, no spinal or CV tenderness.  EXTREMITIES: No evidence of cyanosis, clubbing or edema.                   Assessment:    79 yo with microscopic hematuria and OAB      Nocturia/OAB  -improved with gemtesa, but too expensive, wants to stop and   Doing great with myrbetriq   Follow up in 3 months     Microhematuria:   Negative work-up, repeat UA 10/20/2024        Gino Haas MD  "

## 2023-11-30 ENCOUNTER — LAB (OUTPATIENT)
Dept: LAB | Facility: LAB | Age: 78
End: 2023-11-30
Payer: MEDICARE

## 2023-11-30 DIAGNOSIS — E05.90 HYPERTHYROIDISM: ICD-10-CM

## 2023-11-30 LAB
T3FREE SERPL-MCNC: 3.2 PG/ML (ref 2.3–4.2)
T4 FREE SERPL-MCNC: 0.78 NG/DL (ref 0.61–1.12)
THYROPEROXIDASE AB SERPL-ACNC: >1000 IU/ML
TSH SERPL-ACNC: 0.09 MIU/L (ref 0.44–3.98)

## 2023-11-30 PROCEDURE — 84443 ASSAY THYROID STIM HORMONE: CPT

## 2023-11-30 PROCEDURE — 36415 COLL VENOUS BLD VENIPUNCTURE: CPT

## 2023-11-30 PROCEDURE — 84481 FREE ASSAY (FT-3): CPT

## 2023-11-30 PROCEDURE — 84445 ASSAY OF TSI GLOBULIN: CPT

## 2023-11-30 PROCEDURE — 84439 ASSAY OF FREE THYROXINE: CPT

## 2023-11-30 PROCEDURE — 86376 MICROSOMAL ANTIBODY EACH: CPT

## 2023-12-01 ENCOUNTER — APPOINTMENT (OUTPATIENT)
Dept: PRIMARY CARE | Facility: CLINIC | Age: 78
End: 2023-12-01
Payer: MEDICARE

## 2023-12-04 LAB — TSI SER-ACNC: 1.1 TSI INDEX

## 2023-12-05 ENCOUNTER — OFFICE VISIT (OUTPATIENT)
Dept: PRIMARY CARE | Facility: CLINIC | Age: 78
End: 2023-12-05
Payer: MEDICARE

## 2023-12-05 VITALS
WEIGHT: 158 LBS | HEIGHT: 66 IN | DIASTOLIC BLOOD PRESSURE: 72 MMHG | OXYGEN SATURATION: 94 % | HEART RATE: 104 BPM | RESPIRATION RATE: 16 BRPM | SYSTOLIC BLOOD PRESSURE: 126 MMHG | BODY MASS INDEX: 25.39 KG/M2 | TEMPERATURE: 97.8 F

## 2023-12-05 DIAGNOSIS — E05.90 HYPERTHYROIDISM: Primary | ICD-10-CM

## 2023-12-05 DIAGNOSIS — E05.90 SUBCLINICAL HYPERTHYROIDISM: ICD-10-CM

## 2023-12-05 DIAGNOSIS — I10 BENIGN ESSENTIAL HYPERTENSION: ICD-10-CM

## 2023-12-05 DIAGNOSIS — F41.9 ACUTE ANXIETY: ICD-10-CM

## 2023-12-05 DIAGNOSIS — E78.2 MIXED HYPERLIPIDEMIA: Primary | ICD-10-CM

## 2023-12-05 DIAGNOSIS — R73.01 IMPAIRED FASTING GLUCOSE: ICD-10-CM

## 2023-12-05 PROCEDURE — 3078F DIAST BP <80 MM HG: CPT | Performed by: NURSE PRACTITIONER

## 2023-12-05 PROCEDURE — 3074F SYST BP LT 130 MM HG: CPT | Performed by: NURSE PRACTITIONER

## 2023-12-05 PROCEDURE — 1159F MED LIST DOCD IN RCRD: CPT | Performed by: NURSE PRACTITIONER

## 2023-12-05 PROCEDURE — 1036F TOBACCO NON-USER: CPT | Performed by: NURSE PRACTITIONER

## 2023-12-05 PROCEDURE — 99214 OFFICE O/P EST MOD 30 MIN: CPT | Performed by: NURSE PRACTITIONER

## 2023-12-05 PROCEDURE — 1160F RVW MEDS BY RX/DR IN RCRD: CPT | Performed by: NURSE PRACTITIONER

## 2023-12-05 RX ORDER — BUSPIRONE HYDROCHLORIDE 15 MG/1
15 TABLET ORAL 2 TIMES DAILY
Qty: 60 TABLET | Refills: 5 | Status: SHIPPED | OUTPATIENT
Start: 2023-12-05 | End: 2024-06-10 | Stop reason: SDUPTHER

## 2023-12-05 ASSESSMENT — ENCOUNTER SYMPTOMS
PALPITATIONS: 0
VOMITING: 0
CONSTITUTIONAL NEGATIVE: 1
ACTIVITY CHANGE: 0
SHORTNESS OF BREATH: 0
APNEA: 0
COUGH: 0
MYALGIAS: 0
ARTHRALGIAS: 0
WEAKNESS: 0
SPEECH DIFFICULTY: 0
DIZZINESS: 0
SORE THROAT: 0
NERVOUS/ANXIOUS: 0
CONFUSION: 0
SLEEP DISTURBANCE: 0
NAUSEA: 0
HEADACHES: 0
CHILLS: 0
ABDOMINAL PAIN: 0
FEVER: 0

## 2023-12-05 NOTE — PROGRESS NOTES
Subjective   Patient ID: Robyn Madera is a 78 y.o. female who presents for Follow-up (3 month follow up labs ).    Prediabetes: A1c 6.1%, improved     Hyperthyroidism: +antibodies. Has specialist   TPO antibodies/hashimotos     Concerned if she is gluten sensitive. Patient given handout about gluten intolerance. To discuss with GI       .Recent Results (from the past 1008 hour(s))  -Hemoglobin A1C:   Collection Time: 11/20/23  8:26 AM       Result                      Value             Ref Range           Hemoglobin A1C              6.1 (H)           see below %         Estimated Average Gluc*     128               Not Establis*  -Hepatitis C Antibody:   Collection Time: 11/20/23  8:26 AM       Result                      Value             Ref Range           Hepatitis C AB              Nonreactive       Nonreactive    -TSH with reflex to Free T4 if abnormal:   Collection Time: 11/20/23  8:26 AM       Result                      Value             Ref Range           Thyroid Stimulating Ho*     0.07 (L)          0.44 - 3.98 *  -Basic metabolic panel:   Collection Time: 11/20/23  8:26 AM       Result                      Value             Ref Range           Glucose                     115 (H)           74 - 99 mg/dL       Sodium                      139               136 - 145 mm*       Potassium                   4.2               3.5 - 5.3 mm*       Chloride                    104               98 - 107 mmo*       Bicarbonate                 27                21 - 32 mmol*       Anion Gap                   12                10 - 20 mmol*       Urea Nitrogen               14                6 - 23 mg/dL        Creatinine                  0.69              0.50 - 1.05 *       eGFR                        90                >60 mL/min/1*       Calcium                     8.9               8.6 - 10.3 m*  -Thyroxine, Free:   Collection Time: 11/20/23  8:26 AM       Result                      Value             Ref  "Range           Thyroxine, Free             0.77              0.61 - 1.12 *  -Thyroid Stimulating Hormone:   Collection Time: 11/30/23 10:24 AM       Result                      Value             Ref Range           Thyroid Stimulating Ho*     0.09 (L)          0.44 - 3.98 *  -Thyroxine, Free:   Collection Time: 11/30/23 10:24 AM       Result                      Value             Ref Range           Thyroxine, Free             0.78              0.61 - 1.12 *  -Triiodothyronine, Free:   Collection Time: 11/30/23 10:24 AM       Result                      Value             Ref Range           Triiodothyronine, Free      3.2               2.3 - 4.2 pg*  -Thyroid Stimulating Immunoglobulin:   Collection Time: 11/30/23 10:24 AM       Result                      Value             Ref Range           TSI                         1.1               <=1.3 TSI in*  -Thyroid Peroxidase (TPO) Antibody:   Collection Time: 11/30/23 10:24 AM       Result                      Value             Ref Range           Thyroid Peroxidase (TP*     >1,000 (H)        <=60 IU/mL               Review of Systems   Constitutional: Negative.  Negative for activity change, chills and fever.   HENT:  Negative for congestion, postnasal drip, sneezing and sore throat.    Respiratory:  Negative for apnea, cough and shortness of breath.    Cardiovascular:  Negative for chest pain and palpitations.   Gastrointestinal:  Negative for abdominal pain, nausea and vomiting.   Musculoskeletal:  Negative for arthralgias and myalgias.   Neurological:  Negative for dizziness, syncope, speech difficulty, weakness and headaches.   Psychiatric/Behavioral:  Negative for confusion and sleep disturbance. The patient is not nervous/anxious.        Objective   /72   Pulse 104   Temp 36.6 °C (97.8 °F) (Temporal)   Resp 16   Ht 1.676 m (5' 6\")   Wt 71.7 kg (158 lb)   SpO2 94%   BMI 25.50 kg/m²     Physical Exam  Vitals reviewed.   Constitutional:       " "Appearance: Normal appearance.   Cardiovascular:      Rate and Rhythm: Normal rate and regular rhythm.      Pulses: Normal pulses.      Heart sounds: Normal heart sounds.   Neurological:      Mental Status: She is alert and oriented to person, place, and time.   Psychiatric:         Mood and Affect: Mood normal.         Behavior: Behavior normal.         Assessment/Plan   Problem List Items Addressed This Visit             ICD-10-CM    Benign essential hypertension I10     .Well-controlled  Continue current plan of care  Follow-up in 6 months with labs          Relevant Orders    CBC    Comprehensive Metabolic Panel    Hemoglobin A1C    Lipid Panel    Magnesium    Mixed hyperlipidemia - Primary E78.2     Continue statin, diet  Lab Results   Component Value Date    CHOL 165 08/14/2023    CHOL 151 02/13/2023    CHOL 138 08/09/2022     Lab Results   Component Value Date    HDL 96.1 08/14/2023    HDL 85.8 02/13/2023    HDL 74.7 08/09/2022   No results found for: \"LDLCALC\"  Lab Results   Component Value Date    TRIG 79 08/14/2023    TRIG 71 02/13/2023    TRIG 57 08/09/2022   No components found for: \"CHOLHDL\"          Relevant Orders    CBC    Comprehensive Metabolic Panel    Hemoglobin A1C    Lipid Panel    TSH with reflex to Free T4 if abnormal    Vitamin D 1,25 Dihydroxy (for eval of hypercalcemia)    Vitamin B12    Magnesium    Impaired fasting glucose R73.01     Improved A1c   Continue working on diet         Relevant Orders    Hemoglobin A1C    Subclinical hyperthyroidism E05.90     Follow up with endocrinology   Hashimotos          Relevant Orders    Comprehensive Metabolic Panel    TSH with reflex to Free T4 if abnormal     Other Visit Diagnoses         Codes    Acute anxiety     F41.9    Relevant Medications    busPIRone (Buspar) 15 mg tablet            Time Spent  Time spent directly with patient, family or caregiver: 20 minutes  Documentation Time: 10 minutes      "

## 2023-12-05 NOTE — ASSESSMENT & PLAN NOTE
"Continue statin, diet  Lab Results   Component Value Date    CHOL 165 08/14/2023    CHOL 151 02/13/2023    CHOL 138 08/09/2022     Lab Results   Component Value Date    HDL 96.1 08/14/2023    HDL 85.8 02/13/2023    HDL 74.7 08/09/2022     No results found for: \"LDLCALC\"  Lab Results   Component Value Date    TRIG 79 08/14/2023    TRIG 71 02/13/2023    TRIG 57 08/09/2022     No components found for: \"CHOLHDL\"   "

## 2023-12-06 ENCOUNTER — TELEPHONE (OUTPATIENT)
Dept: ENDOCRINOLOGY | Facility: CLINIC | Age: 78
End: 2023-12-06
Payer: MEDICARE

## 2023-12-06 NOTE — TELEPHONE ENCOUNTER
Pt is scheduled for a thyroid uptake an d scan on 12/19/23&12/20/23 and was advised to NPO 4 hours prior to appt. Pt would like to know if she can take her morning meds of amlodipine, buspar, gabapentin, and OxyContin?    Also was advised needs to be on low iodine diet 3-5 days prior to appt. Is this true?

## 2023-12-10 DIAGNOSIS — M48.062 SPINAL STENOSIS OF LUMBAR REGION WITH NEUROGENIC CLAUDICATION: Primary | ICD-10-CM

## 2023-12-10 NOTE — TELEPHONE ENCOUNTER
Pt is requesting refill of   gabapentin 300 mg 3 times a day Awa Waddell                                                        LV:  9/21/23                  NV: 12/18/23                   Pended RX to Dr. Merchant for transmission to pharmacy.

## 2023-12-11 RX ORDER — GABAPENTIN 100 MG/1
300 CAPSULE ORAL 3 TIMES DAILY
Qty: 810 CAPSULE | Refills: 3 | Status: SHIPPED | OUTPATIENT
Start: 2023-12-11 | End: 2024-12-10

## 2023-12-18 ENCOUNTER — OFFICE VISIT (OUTPATIENT)
Dept: PAIN MEDICINE | Facility: HOSPITAL | Age: 78
End: 2023-12-18
Payer: MEDICARE

## 2023-12-18 VITALS
WEIGHT: 155.5 LBS | RESPIRATION RATE: 16 BRPM | HEART RATE: 90 BPM | HEIGHT: 66 IN | DIASTOLIC BLOOD PRESSURE: 86 MMHG | BODY MASS INDEX: 24.99 KG/M2 | SYSTOLIC BLOOD PRESSURE: 149 MMHG

## 2023-12-18 DIAGNOSIS — M47.816 LUMBAR SPONDYLOSIS: ICD-10-CM

## 2023-12-18 DIAGNOSIS — M43.06 LUMBAR SPONDYLOLYSIS: ICD-10-CM

## 2023-12-18 DIAGNOSIS — M48.061 SPINAL STENOSIS OF LUMBAR REGION, UNSPECIFIED WHETHER NEUROGENIC CLAUDICATION PRESENT: ICD-10-CM

## 2023-12-18 DIAGNOSIS — M79.2 NEUROPATHIC PAIN: Primary | ICD-10-CM

## 2023-12-18 DIAGNOSIS — M54.16 LUMBAR RADICULOPATHY: ICD-10-CM

## 2023-12-18 DIAGNOSIS — M54.16 CHRONIC LUMBAR RADICULOPATHY: ICD-10-CM

## 2023-12-18 DIAGNOSIS — M16.11 PRIMARY OSTEOARTHRITIS OF RIGHT HIP: ICD-10-CM

## 2023-12-18 DIAGNOSIS — M17.12 PRIMARY OSTEOARTHRITIS OF LEFT KNEE: ICD-10-CM

## 2023-12-18 PROCEDURE — 3077F SYST BP >= 140 MM HG: CPT | Performed by: CLINICAL NURSE SPECIALIST

## 2023-12-18 PROCEDURE — 99214 OFFICE O/P EST MOD 30 MIN: CPT | Performed by: CLINICAL NURSE SPECIALIST

## 2023-12-18 PROCEDURE — 1160F RVW MEDS BY RX/DR IN RCRD: CPT | Performed by: CLINICAL NURSE SPECIALIST

## 2023-12-18 PROCEDURE — 1036F TOBACCO NON-USER: CPT | Performed by: CLINICAL NURSE SPECIALIST

## 2023-12-18 PROCEDURE — 1159F MED LIST DOCD IN RCRD: CPT | Performed by: CLINICAL NURSE SPECIALIST

## 2023-12-18 PROCEDURE — 3079F DIAST BP 80-89 MM HG: CPT | Performed by: CLINICAL NURSE SPECIALIST

## 2023-12-18 RX ORDER — DULOXETIN HYDROCHLORIDE 60 MG/1
60 CAPSULE, DELAYED RELEASE ORAL DAILY
Qty: 90 CAPSULE | Refills: 0 | Status: SHIPPED | OUTPATIENT
Start: 2023-12-18 | End: 2024-03-18 | Stop reason: SDUPTHER

## 2023-12-18 RX ORDER — DULOXETIN HYDROCHLORIDE 30 MG/1
30 CAPSULE, DELAYED RELEASE ORAL DAILY
Qty: 90 CAPSULE | Refills: 0 | Status: SHIPPED | OUTPATIENT
Start: 2023-12-18 | End: 2024-03-18 | Stop reason: SDUPTHER

## 2023-12-18 RX ORDER — OXYCODONE HYDROCHLORIDE 5 MG/1
5 TABLET ORAL 2 TIMES DAILY PRN
Qty: 60 TABLET | Refills: 0 | Status: SHIPPED | OUTPATIENT
Start: 2023-12-22 | End: 2024-01-16 | Stop reason: SDUPTHER

## 2023-12-18 ASSESSMENT — PATIENT HEALTH QUESTIONNAIRE - PHQ9
1. LITTLE INTEREST OR PLEASURE IN DOING THINGS: NOT AT ALL
2. FEELING DOWN, DEPRESSED OR HOPELESS: NOT AT ALL
SUM OF ALL RESPONSES TO PHQ9 QUESTIONS 1 AND 2: 0

## 2023-12-18 ASSESSMENT — COLUMBIA-SUICIDE SEVERITY RATING SCALE - C-SSRS
6. HAVE YOU EVER DONE ANYTHING, STARTED TO DO ANYTHING, OR PREPARED TO DO ANYTHING TO END YOUR LIFE?: NO
2. HAVE YOU ACTUALLY HAD ANY THOUGHTS OF KILLING YOURSELF?: NO
1. IN THE PAST MONTH, HAVE YOU WISHED YOU WERE DEAD OR WISHED YOU COULD GO TO SLEEP AND NOT WAKE UP?: NO

## 2023-12-18 ASSESSMENT — ENCOUNTER SYMPTOMS
LOSS OF SENSATION IN FEET: 1
OCCASIONAL FEELINGS OF UNSTEADINESS: 1
DEPRESSION: 0

## 2023-12-18 NOTE — PROGRESS NOTES
"Subjective   Patient ID: Robyn Madera is a 78 y.o. female who presents for lumbar radicular pain and polyarticular pain.  HPI  78-year-old female presents for follow-up of lumbar radicular pain in the setting of lumbar stenosis with neurogenic claudication as well as polyarticular pain.  Pain accompanied by neuropathy lower extremities left greater than right.  Patient has noted an increase in neuropathy to her left lower extremity.  She denies any increased weakness or changes in bowel/bladder function.  Pain is described as aching and tingling.    Pain can be increased by standing and walking.  Currently managing her pain with oxycodone 5 mg twice daily.  Patient previously weaned down from OxyContin.  She continues to endorse 70% relief with this dose of oxycodone.  She denies any adverse effects.  Continued benefit with gabapentin 300 mg 3 times daily and duloxetine 60 mg daily.  Limited relief with previous injections.  She feels that the current medication regimen is adequately controlling her pain and allowing her to be active.  She has noted some intermittent forgetfulness and is unsure if it can be attributed to medication.      Location of Pain: Patient returns to the office for interval re-evaluation of \"lower back pain intermittent sciatic pain, periods of neuropathy to bilateral legs left worse than right. Neuropathy occasionally goes all the wqay to left foot, described as numbness.\" The pain is \"constant\" but varies in severity and continues to be worsened with activities of daily living while relieved by prescription medication.     MRI abdomen/pelvis 9/21/23     Pain Score: 4/10     Treatment: Oxycodone 5mg BID  Medication count: 14  Last doses taken: 12/18/23  Fill date: 11/16/23     Efficacy: 70% relief for 6 hours depending on the day     Narcan: EXP. 4/24     Other medications/neuromodulators: Gabapentin does not need refill, Cymbalta 60mg NEEDS REFILL     Injections and/or Procedures: none " recent      Other: Pelvic Floor Therapy but is doing the exercises at home    OARRS:  No data recorded  I have personally reviewed the OARRS report for Robyn Madera. I have considered the risks of abuse, dependence, addiction and diversion    Is the patient prescribed a combination of a benzodiazepine and opioid?  No    Last Urine Drug Screen / ordered today: No 9/21/23  Recent Results (from the past 8760 hour(s))   OPIATE/OPIOID/BENZO PRESCRIPTION COMPLIANCE    Collection Time: 09/21/23 11:24 AM   Result Value Ref Range    DRUG SCREEN COMMENT URINE SEE BELOW     Creatine, Urine 120.4 mg/dL    Amphetamine Screen, Urine PRESUMPTIVE NEGATIVE NEGATIVE    Barbiturate Screen, Urine PRESUMPTIVE NEGATIVE NEGATIVE    Cannabinoid Screen, Urine PRESUMPTIVE NEGATIVE NEGATIVE    Cocaine Screen, Urine PRESUMPTIVE NEGATIVE NEGATIVE    PCP Screen, Urine PRESUMPTIVE NEGATIVE NEGATIVE    7-Aminoclonazepam <25 Cutoff <25 ng/mL    Alpha-Hydroxyalprazolam <25 Cutoff <25 ng/mL    Alpha-Hydroxymidazolam <25 Cutoff <25 ng/mL    Alprazolam <25 Cutoff <25 ng/mL    Chlordiazepoxide <25 Cutoff <25 ng/mL    Clonazepam <25 Cutoff <25 ng/mL    Diazepam <25 Cutoff <25 ng/mL    Lorazepam <25 Cutoff <25 ng/mL    Midazolam <25 Cutoff <25 ng/mL    Nordiazepam <25 Cutoff <25 ng/mL    Oxazepam <25 Cutoff <25 ng/mL    Temazepam <25 Cutoff <25 ng/mL    Zolpidem <25 Cutoff <25 ng/mL    Zolpidem Metabolite (ZCA) <25 Cutoff <25 ng/mL    6-Acetylmorphine <25 Cutoff <25 ng/mL    Codeine <50 Cutoff <50 ng/mL    Hydrocodone <25 Cutoff <25 ng/mL    Hydromorphone <25 Cutoff <25 ng/mL    Morphine Urine <50 Cutoff <50 ng/mL    Norhydrocodone <25 Cutoff <25 ng/mL    Noroxycodone >1000 (A) Cutoff <25 ng/mL    Oxycodone 1,856 (A) Cutoff <25 ng/mL    Oxymorphone 333 (A) Cutoff <25 ng/mL    Tramadol <50 Cutoff <50 ng/mL    O-Desmethyltramadol <50 Cutoff <50 ng/mL    Fentanyl <2.5 Cutoff<2.5 ng/mL    Norfentanyl <2.5 Cutoff<2.5 ng/mL    METHADONE CONFIRMATION,URINE  <25 Cutoff <25 ng/mL    EDDP <25 Cutoff <25 ng/mL   Tapentadol Confirmation, Urine    Collection Time: 09/21/23 11:24 AM   Result Value Ref Range    Tapentadol <25 Cutoff <25 ng/mL    N-Desmethyltapentadol <25 Cutoff <25 ng/mL   Buprenorphine Confirm,Urine    Collection Time: 09/21/23 11:24 AM   Result Value Ref Range    BUPRENORPHINE GLUC, URINE <5 ng/mL    BUPRENORPHINE ,URINE <2 ng/mL    NALOXONE, URINE <100 ng/mL    NORBUPRENORPHINE GLUC,URINE <5 ng/mL    NORBUPRENORPHINE, URINE <2 ng/mL     Results are as expected.     Controlled Substance Agreement: 9/21/23  Date of the Last Agreement: 9/21/23  Reviewed Controlled Substance Agreement including but not limited to the benefits, risks, and alternatives to treatment with a Controlled Substance medication(s).    Monitoring and compliance:    ORT: 9/21/23    PDUQ: 12/18/23 score 4    Office Agreement: 6/15/23      Review of Systems    ROS:   General: No fevers, chills, weight loss  Skin: Negative for lesions  Eyes: No acute vision changes  Ears: No vertigo  Nose, mouth, throat: No difficulty swallowing or speaking  Respiratory: No cough, shortness of breath, cyanosis  Cardiovascular: Negative for chest pain syncope or palpitation  Gastrointestinal: No constipation, nausea, vomiting  Neurological: Negative for headache, positive for: Paresthesia  Psychological: Negative for severe or debilitating anxiety, depression. Negative memory loss  Musculoskeletal: Positive for arthralgia, myalgia and pain  Endocrine: Negative for weight gain, appetite changes, excessive sweating  Allergy/immune: Negative    All 13 systems were reviewed and are within normal levels except as noted or in the history of present illness.  Positive or pertinent negative responses are noted or were in the history of present illness. As noted, the patient denies significant or impairing weakness in the bilateral upper and lower extremities, medication induced constipation, and bowel or bladder  incontinence.     Current Outpatient Medications:     acetaminophen (Tylenol) 500 mg tablet, Take 1 tablet (500 mg) by mouth 3 times a day., Disp: , Rfl:     amLODIPine (Norvasc) 10 mg tablet, Take 1 tablet (10 mg) by mouth once daily., Disp: 90 tablet, Rfl: 3    aspirin 81 mg EC tablet, Take 1 tablet (81 mg) by mouth once daily., Disp: , Rfl:     atorvastatin (Lipitor) 40 mg tablet, Take 1 tablet (40 mg) by mouth once daily at bedtime., Disp: 90 tablet, Rfl: 3    BACILLUS COAGULANS-INULIN ORAL, Take by mouth., Disp: , Rfl:     busPIRone (Buspar) 15 mg tablet, Take 1 tablet (15 mg) by mouth 2 times a day., Disp: 60 tablet, Rfl: 5    calcium carbonate-vitamin D3 500 mg-3.125 mcg (125 unit) tablet tablet, Take by mouth., Disp: , Rfl:     cholecalciferol (Vitamin D-3) 25 MCG (1000 UT) capsule, Take by mouth., Disp: , Rfl:     docusate sodium (Colace) 100 mg capsule, Take by mouth twice a day., Disp: , Rfl:     DULoxetine (Cymbalta) 60 mg DR capsule, Take 1 capsule (60 mg) by mouth once daily. Do not crush or chew., Disp: 30 capsule, Rfl: 11    esomeprazole (NexIUM) 20 mg DR capsule, Take 1 capsule (20 mg) by mouth once daily., Disp: , Rfl:     gabapentin (Neurontin) 100 mg capsule, Take 3 capsules (300 mg) by mouth 3 times a day., Disp: 810 capsule, Rfl: 3    ketoconazole (NIZOral) 2 % shampoo, Apply 2 % topically if needed for irritation or dandruff., Disp: , Rfl:     L. acidophilus/Bifid. animalis 15.5 billion cell capsule, Take by mouth., Disp: , Rfl:     lactase (Lactaid) 9,000 unit tablet, Take by mouth., Disp: , Rfl:     naloxone (Narcan) 4 mg/0.1 mL nasal spray, Administer into affected nostril(s)., Disp: , Rfl:     oxyCODONE (Roxicodone) 5 mg immediate release tablet, Take 1 tablet (5 mg) by mouth 2 times a day as needed for severe pain (7 - 10). Do not start before November 22, 2023., Disp: 60 tablet, Rfl: 0    polyethylene glycol (Glycolax) 17 gram/dose powder, Take by mouth every other day., Disp: , Rfl:      vibegron 75 mg tablet, Take 1 tablet (75 mg) by mouth once daily in the evening. Take 2 hours before bedtime, Disp: , Rfl:      Past Medical History:   Diagnosis Date    Abnormal findings on diagnostic imaging of heart and coronary circulation 11/07/2019    Abnormal Heart Score CT    Abnormal levels of other serum enzymes 10/24/2019    Abnormal cardiac enzyme level    Allergic rhinitis due to animal hair or dander 10/28/2013    Anxiety 2023    Arthritis 2016    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/2014    Disease of thyroid gland 2018    Diverticulosis of colon 06/17/2013    Diverticulosis of intestine, part unspecified, without perforation or abscess without bleeding     Diverticulosis    Gastric polyp 06/23/2014    GERD (gastroesophageal reflux disease) 2008    Glaucoma suspect, both eyes 01/08/2015    HL (hearing loss) 2022    Hx of squamous cell carcinoma of skin 06/09/2015    Hypertension 2015    Internal hemorrhoid 03/21/2016    Low TSH level 03/10/2023    Lung nodule, solitary 03/10/2023    Lung nodule, solitary 03/10/2023    Stable CT 6/23  No follow up needed    Mild intermittent asthma without complication 02/24/2016    Other microscopic hematuria 03/01/2018    Microscopic hematuria    Other specified abnormal findings of blood chemistry 10/19/2021    Elevated LFTs    Other specified abnormal findings of blood chemistry 10/19/2021    Elevated LFTs    Pain in right knee     Right knee pain    Personal history of other diseases of the digestive system     History of hiatal hernia    Personal history of other diseases of the musculoskeletal system and connective tissue 01/17/2020    History of osteopenia    Personal history of other diseases of the musculoskeletal system and connective tissue     History of herniated intervertebral disc    Personal history of other diseases of the musculoskeletal system and connective tissue     History of chronic back pain    Personal history of other diseases  of the nervous system and sense organs     History of cataract    Personal history of other diseases of the nervous system and sense organs     History of glaucoma    Personal history of other diseases of the respiratory system     History of allergic rhinitis    Personal history of other endocrine, nutritional and metabolic disease     History of hyperlipidemia    Personal history of other endocrine, nutritional and metabolic disease     History of Graves' disease    Personal history of other endocrine, nutritional and metabolic disease     History of hypoglycemia    Personal history of other malignant neoplasm of skin     History of malignant neoplasm of skin    Personal history of other mental and behavioral disorders     History of anxiety    Presence of right artificial knee joint 09/21/2017    Scoliosis 2008    Visual impairment 2008    Wrist sprain 03/10/2023        Past Surgical History:   Procedure Laterality Date    JOINT REPLACEMENT  2017-18    KNEE ARTHROSCOPY W/ DEBRIDEMENT  07/09/2016    Arthroscopy Knee    OTHER SURGICAL HISTORY  10/14/2019    Excision of squamous cell carcinoma        Family History   Problem Relation Name Age of Onset    Diabetes Mother Jaci Bingham         Allergies   Allergen Reactions    House Dust Hives    House Dust Mite Itching    Adhesive Tape-Silicones Other     ADHESIVES CAUSES BLISTERS    Cat Dander Unknown    Penicillins Hives    Adhesive Rash     ADHESIVES CAUSES BLISTERS        Objective     Visit Vitals  Smoking Status Never        Physical Exam    PE:  General: Well-developed, well-nourished, no acute distress. The patient demonstrates no pain behavior, symptom magnification or overt drug-seeking behavior.  Eye: Pupils appropriate for room lighting  Neck/thyroid: No obvious goiter or enlargement of neck noted  Respiratory exam: Normal respiratory effort, unlabored respiration. No accessory muscle use noted  Cardiac exam: Bilateral radial pulses intact  Abdominal:  Nondistended  Spine, lumbar: The patient is able to rise from a seated to standing position without hesitancy, push off, or delay. Gait is grossly nonantalgic.  No tenderness to paraspinous musculature is noted.  Flexion and extension intact with extension exacerbating pain.  Reported neuropathy  Neurologic exam: Muscle strength is antigravity in all 4 extremities.  Bilateral lower extremities left foot greater than right.  Psychiatric exam: Judgment and insight normal, affect normal, speech is fluent, affect appropriate, demonstrating no signs of hypersomnolence, sedation, or confusion          Assessment/Plan   Problem List Items Addressed This Visit             ICD-10-CM    Lumbar spondylolysis M43.06    Primary osteoarthritis of right hip M16.11    Spinal stenosis of lumbar region M48.061     78-year-old female with lower back pain with lower extremity radicular symptoms in the setting of lumbar stenosis with neurogenic claudication. Patient is doing much better weaned down from OxyContin 20 mg twice daily and currently maintained on oxycodone 5 mg up to 2 times per day as needed.  Continued benefit with gabapentin 300 mg 3 times daily and duloxetine 60 mg daily.  She continues to experience some increase in neuropathy to her left lower extremity.  We discussed increasing her dose of duloxetine to 90 mg daily to optimize relief of neuropathic pain.  The patient would like to try the increased dose of duloxetine.  Advised the patient that if she experiences any side effects that she may reduce her dose back to 60 mg daily of duloxetine.  She continues to do well with the reduced dose of oxycodone 5 mg twice daily.  She continues to endorse approximately 70% relief with this medication.  Plan discussed with patient at today's visit.    -Continue oxycodone 5 mg up to 2 times per day as needed for pain.  This dose appears to be well-tolerated and allows the patient to remain functional.  -Continue gabapentin 300 mg  3 times daily.  Patient appears to tolerate without adverse effects.  She has noted some cognitive changes in the form of forgetfulness.  Advised the patient that gabapentin or duloxetine may contribute to this symptom.  She will continue to monitor.  The patient was counseled on the risks and potential side effects of gabapentin as well as its importance for dosage titration. Side effects included but were not limited to, drowsiness, sedation, cognitive decline, peripheral edema, weight gain, seizures, with abrupt withdrawal.  Patient was instructed to call the office with any concerns or side effects before abruptly stopping medication.   -We will increase the patient's duloxetine to 90 mg daily.  Patient instructed to observe for any adverse effects with this medication and if she notes any increase in adverse effects to return to the 60 mg dose which she currently tolerates.  We will try an increased dose to optimize neuropathic pain relief.  -Patient will return to our office in 3 months or sooner if needed.    MEDICAL DECISION MAKING:    My impressions and treatment recommendations were discussed in detail with the patient, who verbalized understanding and had no further questions prior to discharge. Given the patient's report of reduced pain and improved functional ability without adverse effects,  it is reasonable to continue oxycodone 5 mg up to 2 times per day as needed.  The terms of the opioid agreement as well as the potential risks and adverse effects of the patient's medication regimen were discussed in detail. This includes if applicable due to dosage of medication permission to discuss and coordinate care with other treatment providers relevant to the patients condition. The patient verbalized understanding.    Treatment goals were discussed in detail with the patient.  These goals include reduction of pain levels, improved levels of functioning, avoidance of medication side effect and lowest  medication dose possible to achieve  these goals.  The patient was in full agreement with these goals.  Also discussed is the understanding that pain may not be eliminated by medication but that the goal of a better sustaining life through use of medication is appropriate.  Lifestyle modifications including weight management, stretching, diet, exercise and smoking are addressed at each office visit.  The patient provided a urine drug screen for routine monitoring and compliance.  This test may be given as frequently as every month based on the patient's individual opiate risk stratification and prescriber concerns for any aberrancies.  This test is indicated given the use of controlled substances for the patient's medical condition.  Unless otherwise noted the prior (s) urine drug testing results were consistent with prescribed medications.  There is no evidence of illicit drug use or additional medications ingested.     Risks and side effects of chronic opioid therapy including but not limited to tolerance, dependence, constipation, hyperalgesia, cognitive side effects, addiction and possible death due to overuse and or misuse were discussed. I also discussed that such medications when co-administered with other sedative agents including but not limited to alcohol, benzodiazepines, sedative hypnotics and illegal drugs could pose life threatening consequences including death.  I also explained the impact that the administration of such medication has on a patient with obstructive sleep apnea and continued recommendations for use of apnea devices if ordered are prescribed by other physicians.  In order to effectively and safely treat the pain, I also emphasized the importance of compliance with the treatment plan as well as compliance with the terms of the opioid agreement which was reviewed in detail. I explained the importance of being responsible with the medications and to take these only as prescribed, never in  excess and never for reasons other than pain reduction. The patient was counseled on keeping the medications safe and locked away from children and other adults as well as disposal methods and options. The patient understood the risks and instructions.      I also discussed with the patient in detail that based on the clinical response to the opioid medications and improvements of activities of daily living, sleep, and work performance in light of compliance with the treatment plan we can continue this form of therapy for the above chronic  pain.  The goal and rationale used for current treatment with chronic opioid medication is to control the pain and alleviate disability induced by the chronic pain condition noted above after failures of other non-opioid and nonpharmacological modalities to treat the chronic pain and the symptoms associated with have failed.  The patient understood the goals in terms of the above treatment plan and had no further questions prior to leaving the office today.    Given the patient's total MED, general use of daily opiates, or other coadministered medications in various classes the patient was offered a prescription for Narcan.  I instructed the patient that Narcan is for emergency use only and is worse suspected or known opiate overdose.  Call 911 prior to administration of this medication to activate the emergency response team.  It is imperative to fill this medication in order to demonstrate understanding of the gravity of possible side effects including respiratory depression and risk of overdose of this opiate load or medication combination.  As such patients will be required to bring Narcan prescriptions to follow-up appointments as part of the compliance with continued opiate care.    Disclaimer: This note was transcribed using an audio transcription device.  As such, minor errors may be present with regard to spelling, punctuation, and inadvertent word insertion.  Please  disregard such errors.             Relevant Medications    oxyCODONE (Roxicodone) 5 mg immediate release tablet (Start on 12/22/2023)    Primary osteoarthritis of left knee M17.12    Chronic lumbar radiculopathy M54.16     Other Visit Diagnoses         Codes    Neuropathic pain    -  Primary M79.2    Relevant Medications    DULoxetine (Cymbalta) 60 mg DR capsule    DULoxetine (Cymbalta) 30 mg DR capsule    Lumbar radiculopathy     M54.16    Relevant Medications    oxyCODONE (Roxicodone) 5 mg immediate release tablet (Start on 12/22/2023)    Lumbar spondylosis     M47.816    Relevant Medications    oxyCODONE (Roxicodone) 5 mg immediate release tablet (Start on 12/22/2023)

## 2023-12-18 NOTE — ASSESSMENT & PLAN NOTE
78-year-old female with lower back pain with lower extremity radicular symptoms in the setting of lumbar stenosis with neurogenic claudication. Patient is doing much better weaned down from OxyContin 20 mg twice daily and currently maintained on oxycodone 5 mg up to 2 times per day as needed.  Continued benefit with gabapentin 300 mg 3 times daily and duloxetine 60 mg daily.  She continues to experience some increase in neuropathy to her left lower extremity.  We discussed increasing her dose of duloxetine to 90 mg daily to optimize relief of neuropathic pain.  The patient would like to try the increased dose of duloxetine.  Advised the patient that if she experiences any side effects that she may reduce her dose back to 60 mg daily of duloxetine.  She continues to do well with the reduced dose of oxycodone 5 mg twice daily.  She continues to endorse approximately 70% relief with this medication.  Plan discussed with patient at today's visit.    -Continue oxycodone 5 mg up to 2 times per day as needed for pain.  This dose appears to be well-tolerated and allows the patient to remain functional.  -Continue gabapentin 300 mg 3 times daily.  Patient appears to tolerate without adverse effects.  She has noted some cognitive changes in the form of forgetfulness.  Advised the patient that gabapentin or duloxetine may contribute to this symptom.  She will continue to monitor.  The patient was counseled on the risks and potential side effects of gabapentin as well as its importance for dosage titration. Side effects included but were not limited to, drowsiness, sedation, cognitive decline, peripheral edema, weight gain, seizures, with abrupt withdrawal.  Patient was instructed to call the office with any concerns or side effects before abruptly stopping medication.   -We will increase the patient's duloxetine to 90 mg daily.  Patient instructed to observe for any adverse effects with this medication and if she notes any  increase in adverse effects to return to the 60 mg dose which she currently tolerates.  We will try an increased dose to optimize neuropathic pain relief.  -Patient will return to our office in 3 months or sooner if needed.    MEDICAL DECISION MAKING:    My impressions and treatment recommendations were discussed in detail with the patient, who verbalized understanding and had no further questions prior to discharge. Given the patient's report of reduced pain and improved functional ability without adverse effects,  it is reasonable to continue oxycodone 5 mg up to 2 times per day as needed.  The terms of the opioid agreement as well as the potential risks and adverse effects of the patient's medication regimen were discussed in detail. This includes if applicable due to dosage of medication permission to discuss and coordinate care with other treatment providers relevant to the patients condition. The patient verbalized understanding.    Treatment goals were discussed in detail with the patient.  These goals include reduction of pain levels, improved levels of functioning, avoidance of medication side effect and lowest medication dose possible to achieve  these goals.  The patient was in full agreement with these goals.  Also discussed is the understanding that pain may not be eliminated by medication but that the goal of a better sustaining life through use of medication is appropriate.  Lifestyle modifications including weight management, stretching, diet, exercise and smoking are addressed at each office visit.  The patient provided a urine drug screen for routine monitoring and compliance.  This test may be given as frequently as every month based on the patient's individual opiate risk stratification and prescriber concerns for any aberrancies.  This test is indicated given the use of controlled substances for the patient's medical condition.  Unless otherwise noted the prior (s) urine drug testing results  were consistent with prescribed medications.  There is no evidence of illicit drug use or additional medications ingested.     Risks and side effects of chronic opioid therapy including but not limited to tolerance, dependence, constipation, hyperalgesia, cognitive side effects, addiction and possible death due to overuse and or misuse were discussed. I also discussed that such medications when co-administered with other sedative agents including but not limited to alcohol, benzodiazepines, sedative hypnotics and illegal drugs could pose life threatening consequences including death.  I also explained the impact that the administration of such medication has on a patient with obstructive sleep apnea and continued recommendations for use of apnea devices if ordered are prescribed by other physicians.  In order to effectively and safely treat the pain, I also emphasized the importance of compliance with the treatment plan as well as compliance with the terms of the opioid agreement which was reviewed in detail. I explained the importance of being responsible with the medications and to take these only as prescribed, never in excess and never for reasons other than pain reduction. The patient was counseled on keeping the medications safe and locked away from children and other adults as well as disposal methods and options. The patient understood the risks and instructions.      I also discussed with the patient in detail that based on the clinical response to the opioid medications and improvements of activities of daily living, sleep, and work performance in light of compliance with the treatment plan we can continue this form of therapy for the above chronic  pain.  The goal and rationale used for current treatment with chronic opioid medication is to control the pain and alleviate disability induced by the chronic pain condition noted above after failures of other non-opioid and nonpharmacological modalities to  treat the chronic pain and the symptoms associated with have failed.  The patient understood the goals in terms of the above treatment plan and had no further questions prior to leaving the office today.    Given the patient's total MED, general use of daily opiates, or other coadministered medications in various classes the patient was offered a prescription for Narcan.  I instructed the patient that Narcan is for emergency use only and is worse suspected or known opiate overdose.  Call 911 prior to administration of this medication to activate the emergency response team.  It is imperative to fill this medication in order to demonstrate understanding of the gravity of possible side effects including respiratory depression and risk of overdose of this opiate load or medication combination.  As such patients will be required to bring Narcan prescriptions to follow-up appointments as part of the compliance with continued opiate care.    Disclaimer: This note was transcribed using an audio transcription device.  As such, minor errors may be present with regard to spelling, punctuation, and inadvertent word insertion.  Please disregard such errors.

## 2023-12-19 ENCOUNTER — HOSPITAL ENCOUNTER (OUTPATIENT)
Dept: RADIOLOGY | Facility: HOSPITAL | Age: 78
Discharge: HOME | End: 2023-12-19
Payer: MEDICARE

## 2023-12-19 DIAGNOSIS — E05.90 HYPERTHYROIDISM: ICD-10-CM

## 2023-12-19 PROCEDURE — 78014 THYROID IMAGING W/BLOOD FLOW: CPT

## 2023-12-19 RX ADMIN — SODIUM IODIDE I 123 400 MICROCURIE: 200 CAPSULE, GELATIN COATED ORAL at 12:10

## 2023-12-20 ENCOUNTER — HOSPITAL ENCOUNTER (OUTPATIENT)
Dept: RADIOLOGY | Facility: HOSPITAL | Age: 78
Discharge: HOME | End: 2023-12-20
Payer: MEDICARE

## 2023-12-20 PROCEDURE — 78014 THYROID IMAGING W/BLOOD FLOW: CPT | Performed by: STUDENT IN AN ORGANIZED HEALTH CARE EDUCATION/TRAINING PROGRAM

## 2023-12-20 PROCEDURE — 3430000001 HC RX 343 DIAGNOSTIC RADIOPHARMACEUTICALS: Performed by: RADIOLOGY

## 2023-12-20 PROCEDURE — A9516 IODINE I-123 SOD IODIDE MIC: HCPCS | Performed by: RADIOLOGY

## 2023-12-20 RX ORDER — SODIUM IODIDE I 123 200 UCI/1
400 CAPSULE, GELATIN COATED ORAL
Status: COMPLETED | OUTPATIENT
Start: 2023-12-20 | End: 2023-12-19

## 2023-12-21 ENCOUNTER — ANCILLARY PROCEDURE (OUTPATIENT)
Dept: RADIOLOGY | Facility: CLINIC | Age: 78
End: 2023-12-21
Payer: MEDICARE

## 2023-12-21 DIAGNOSIS — R93.1 AGATSTON CAC SCORE, >400: ICD-10-CM

## 2024-01-16 DIAGNOSIS — M47.816 LUMBAR SPONDYLOSIS: ICD-10-CM

## 2024-01-16 DIAGNOSIS — M48.061 SPINAL STENOSIS OF LUMBAR REGION, UNSPECIFIED WHETHER NEUROGENIC CLAUDICATION PRESENT: ICD-10-CM

## 2024-01-16 DIAGNOSIS — M54.16 LUMBAR RADICULOPATHY: ICD-10-CM

## 2024-01-16 NOTE — TELEPHONE ENCOUNTER
Pt is requesting refill of  oxycodone 5 mg IR 1 tablet BID Awa Waddell                                                         LV:  12/18/23                  NV:  3/18/24                OARRS reviewed with LFD:  12/22/23  #60/30 days                         Pended RX to MIKHAIL Hermosillo for transmission to pharmacy.

## 2024-01-19 PROBLEM — R10.9 ABDOMINAL PAIN: Status: ACTIVE | Noted: 2024-01-19

## 2024-01-19 RX ORDER — OXYCODONE HYDROCHLORIDE 5 MG/1
5 TABLET ORAL 2 TIMES DAILY PRN
Qty: 60 TABLET | Refills: 0 | Status: SHIPPED | OUTPATIENT
Start: 2024-01-21 | End: 2024-02-19 | Stop reason: SDUPTHER

## 2024-01-19 NOTE — TELEPHONE ENCOUNTER
Okay to refill oxycodone 5 mg 2 times per day as needed for pain #60 for 30 days with start date 1/21/2024

## 2024-01-22 PROBLEM — R60.0 LEG EDEMA, RIGHT: Status: ACTIVE | Noted: 2024-01-22

## 2024-01-22 PROBLEM — R93.1 ELEVATED CORONARY ARTERY CALCIUM SCORE: Status: ACTIVE | Noted: 2024-01-22

## 2024-01-22 PROBLEM — E78.5 DYSLIPIDEMIA: Status: ACTIVE | Noted: 2024-01-22

## 2024-01-22 RX ORDER — MIRABEGRON 50 MG/1
50 TABLET, EXTENDED RELEASE ORAL DAILY
COMMUNITY
End: 2024-06-10 | Stop reason: SDUPTHER

## 2024-01-22 ASSESSMENT — ENCOUNTER SYMPTOMS
NEAR-SYNCOPE: 0
HEMATOCHEZIA: 0
PALPITATIONS: 0
ALTERED MENTAL STATUS: 0
VOMITING: 0
COUGH: 0
IRREGULAR HEARTBEAT: 0
ORTHOPNEA: 0
HEMATURIA: 0
SYNCOPE: 0
SHORTNESS OF BREATH: 0
FEVER: 0
DYSPNEA ON EXERTION: 0
CHILLS: 0
NAUSEA: 0
WHEEZING: 0

## 2024-01-22 NOTE — PROGRESS NOTES
Chief Complaint/Reason for Visit:  Follow-up (Swelling in legs) LLE swelling    History Of Present Illness:    Robyn Madera is a 79 y.o. female that presents to the office for evaluation of LLE swelling.    Taking medications as prescribed.     PMH is significant for hyperlipidemia, hyperthyroidism, abnormal CT calcium score, GERD, Graves disease and RBBB.     She presented today to discuss BLE edema, R>L.  Edema is worse at the end of the day and better after having legs elevated in bed.  Swelling is typically gone in the morning.  Denies any BLE pain, redness.  Denies any long periods of travel by car, plane etc.  She does typically have slow healing wounds to BLE, but they do eventually heal.  She wears bilateral tubi- as she has difficulty with getting compression stockings on.      Family history: Mom - varicosities requesting surgical intervention    EKG personally reviewed today showed SR with RBBB with HR 91 bpm.  This will go to the cardiologist for final review.     She is a retired traveling .    Past Medical History:  She has a past medical history of Abnormal findings on diagnostic imaging of heart and coronary circulation (11/07/2019), Abnormal levels of other serum enzymes (10/24/2019), Allergic rhinitis due to animal hair or dander (10/28/2013), Anxiety (2023), Arthritis (2016), Combined form of senile cataract of both eyes (10/29/2014), Disease of thyroid gland (2018), Diverticulosis of colon (06/17/2013), Diverticulosis of intestine, part unspecified, without perforation or abscess without bleeding, Gastric polyp (06/23/2014), GERD (gastroesophageal reflux disease) (2008), Glaucoma suspect, both eyes (01/08/2015), HL (hearing loss) (2022), squamous cell carcinoma of skin (06/09/2015), Hypertension (2015), Internal hemorrhoid (03/21/2016), Low TSH level (03/10/2023), Lung nodule, solitary (03/10/2023), Lung nodule, solitary (03/10/2023), Mild intermittent asthma without  complication (02/24/2016), Other microscopic hematuria (03/01/2018), Other specified abnormal findings of blood chemistry (10/19/2021), Other specified abnormal findings of blood chemistry (10/19/2021), Pain in right knee, Personal history of other diseases of the digestive system, Personal history of other diseases of the musculoskeletal system and connective tissue (01/17/2020), Personal history of other diseases of the musculoskeletal system and connective tissue, Personal history of other diseases of the musculoskeletal system and connective tissue, Personal history of other diseases of the nervous system and sense organs, Personal history of other diseases of the nervous system and sense organs, Personal history of other diseases of the respiratory system, Personal history of other endocrine, nutritional and metabolic disease, Personal history of other endocrine, nutritional and metabolic disease, Personal history of other endocrine, nutritional and metabolic disease, Personal history of other malignant neoplasm of skin, Personal history of other mental and behavioral disorders, Presence of right artificial knee joint (09/21/2017), Scoliosis (2008), Visual impairment (2008), and Wrist sprain (03/10/2023).    Past Surgical History:  She has a past surgical history that includes Knee arthroscopy w/ debridement (07/09/2016); Other surgical history (10/14/2019); and Joint replacement (2017-18).      Social History:  She reports that she has never smoked. She has never used smokeless tobacco. She reports that she does not currently use alcohol. She reports that she does not use drugs.    Family History:  Family History   Problem Relation Name Age of Onset    Diabetes Mother Jaci Bingham         Allergies:  House dust, Adhesive tape-silicones, Cat dander, and Penicillins    Review of Systems   Constitutional: Negative for chills and fever.   Cardiovascular:  Positive for leg swelling (BLE, L>R). Negative for chest  pain, dyspnea on exertion, irregular heartbeat, near-syncope, orthopnea, palpitations and syncope.   Respiratory:  Negative for cough, shortness of breath and wheezing.    Gastrointestinal:  Negative for hematochezia, melena, nausea and vomiting.   Genitourinary:  Negative for hematuria.   Psychiatric/Behavioral:  Negative for altered mental status.        Objective      Vitals reviewed.   Constitutional:       Appearance: Not in distress.   Neck:      Vascular: No carotid bruit.   Pulmonary:      Effort: Pulmonary effort is normal.      Breath sounds: Normal breath sounds.   Cardiovascular:      PMI at left midclavicular line. Normal rate. Regular rhythm. S1 with normal intensity. S2 with normal intensity.       Murmurs: There is no murmur.   Edema:     Peripheral edema present.     Pretibial: 1+ non-pitting edema of the left pretibial area and trace non-pitting edema of the right pretibial area.     Ankle: 1+ non-pitting edema of the left ankle and trace non-pitting edema of the right ankle.  Abdominal:      General: Bowel sounds are normal.   Skin:     Comments: Brownish vascular insufficiency color changes from mid skin down to ankle noted. Scabbed area to left shin - no open areas/drainage or erythema.   Neurological:      Mental Status: Alert and oriented to person, place and time.         Current Outpatient Medications   Medication Instructions    amLODIPine (NORVASC) 10 mg, oral, Daily    aspirin 81 mg EC tablet 1 tablet, oral, Daily    atorvastatin (LIPITOR) 40 mg, oral, Nightly    busPIRone (BUSPAR) 15 mg, oral, 2 times daily    calcium carbonate-vitamin D3 500 mg-3.125 mcg (125 unit) tablet tablet oral    cholecalciferol (Vitamin D-3) 25 MCG (1000 UT) capsule oral    docusate sodium (Colace) 100 mg capsule oral, 2 times daily    DULoxetine (CYMBALTA) 60 mg, oral, Daily, Do not crush or chew.  Will take duloxetine 60 mg and 30 mg capsule daily for a total dose of 90 mg daily.    DULoxetine (CYMBALTA) 30  "mg, oral, Daily, Do not crush or chew.  Patient will take duloxetine 30 mg and 60 mg capsule daily for a total dose of 90 mg daily.    esomeprazole (NexIUM) 20 mg DR capsule 1 capsule, oral, Daily    gabapentin (NEURONTIN) 300 mg, oral, 3 times daily    mirabegron (MYRBETRIQ) 50 mg, oral, Daily    oxyCODONE (ROXICODONE) 5 mg, oral, 2 times daily PRN    polyethylene glycol (Glycolax) 17 gram/dose powder oral, Every other day        Last Labs:  CBC -  Lab Results   Component Value Date    WBC 6.2 08/25/2023    HGB 14.3 08/25/2023    HCT 42.3 08/25/2023    MCV 95 08/25/2023     08/25/2023       RENAL FUNCTION PANEL -   Lab Results   Component Value Date    GLUCOSE 115 (H) 11/20/2023     11/20/2023    K 4.2 11/20/2023     11/20/2023    CO2 27 11/20/2023    ANIONGAP 12 11/20/2023    BUN 14 11/20/2023    CREATININE 0.69 11/20/2023    CALCIUM 8.9 11/20/2023    ALBUMIN 4.5 08/14/2023        CMP -  Lab Results   Component Value Date    CALCIUM 8.9 11/20/2023    PROT 7.6 08/14/2023    ALBUMIN 4.5 08/14/2023    AST 24 08/14/2023    ALT 19 08/14/2023    ALKPHOS 69 08/14/2023    BILITOT 0.9 08/14/2023       LIPID PANEL -   Lab Results   Component Value Date    CHOL 165 08/14/2023    TRIG 79 08/14/2023    HDL 96.1 08/14/2023    CHHDL 1.7 08/14/2023    LDLF 53 08/14/2023    VLDL 16 08/14/2023     No results found for: \"LDLCALC\"    Lab Results   Component Value Date    HGBA1C 6.1 (H) 11/20/2023       Lab Results   Component Value Date    TSH 0.09 (L) 11/30/2023       No results found for this or any previous visit.     Last Cardiology Tests:    Stress test 7/2/21:   1. Correlate with myocardial perfusion imaging results.   2. No clinical or electrocardiographic evidence for ischemia at maximal infusion.   3. Nuclear image results are reported separately.  1. Normal stress myocardial perfusion imaging in response to  pharmacologic stress.  2. Calculated ejection fraction of 59% without segmental wall " "motion  abnormality seen.    Visit Vitals  /78 (BP Location: Right arm)   Pulse 91   Ht 1.676 m (5' 6\")   Wt 73 kg (161 lb)   BMI 25.99 kg/m²   Smoking Status Never   BSA 1.84 m²       Assessment/Plan   The primary encounter diagnosis was Leg edema, right. Diagnoses of Elevated coronary artery calcium score, Benign essential hypertension, and Dyslipidemia were also pertinent to this visit.    1. BLE edema, L>R  No erythema, tenderness/pain  Scabbed area to left shin.  Otherwise no open wounds.  Edema is gone in the morning after having her legs elevated in bed at night  She wears tubi-grib compression as it is difficult for her to get compression stockings on  Offered venous reflux duplex ultrasound, but declined at this time as she wouldn't be interested in any surgical procedures/ablation with vascular surgery  Script given for compression stockings  Offered to change amlodipine to different antihypertensives, but she would like to continue amlodipine at this time.     2. Abnormal CT Calcium Score  Noted to have abnormal CT calcium score in office visit note with Dr. William Oct 2019.  I am not able to find this result to review  Stress test July 2023 with no ischemia  On atorvastatin 40 mg daily  Counseled about secondary risk factor modification keeping LDL 60-70.  Repeat CT calcium score as already ordered by Dr. Carl William. Patient states she went to [facility] and they would not repeat it within 5 years. Denies any chest pain/pressure or SOB.    3. HTN  Stable  On Amlodipine 10 mg daily     4. Chronic back pain  Follows with pain management     5. Dyslipidemia  Goal LDL <70.  Currently at goal.  Continue  atorvastatin 40 mg daily.      Lily Rajan, APRN-CNP   "

## 2024-01-22 NOTE — PATIENT INSTRUCTIONS
Recommend Mediterranean style of eating  Continue current medications  Recommend compression stockings - apply upon awakening and remove at bedtime  Follow-up with Dr. William as scheduled  If you have any questions or cardiac concerns, please call our office at [phone].

## 2024-01-23 ENCOUNTER — OFFICE VISIT (OUTPATIENT)
Dept: CARDIOLOGY | Facility: CLINIC | Age: 79
End: 2024-01-23
Payer: MEDICARE

## 2024-01-23 VITALS
SYSTOLIC BLOOD PRESSURE: 128 MMHG | HEART RATE: 91 BPM | DIASTOLIC BLOOD PRESSURE: 78 MMHG | HEIGHT: 66 IN | BODY MASS INDEX: 25.88 KG/M2 | WEIGHT: 161 LBS

## 2024-01-23 DIAGNOSIS — I83.90 ASYMPTOMATIC VARICOSE VEINS: ICD-10-CM

## 2024-01-23 DIAGNOSIS — E78.5 DYSLIPIDEMIA: ICD-10-CM

## 2024-01-23 DIAGNOSIS — R60.0 BILATERAL LEG EDEMA: ICD-10-CM

## 2024-01-23 DIAGNOSIS — R60.0 LEG EDEMA, RIGHT: Primary | ICD-10-CM

## 2024-01-23 DIAGNOSIS — R93.1 ELEVATED CORONARY ARTERY CALCIUM SCORE: ICD-10-CM

## 2024-01-23 DIAGNOSIS — I10 BENIGN ESSENTIAL HYPERTENSION: ICD-10-CM

## 2024-01-23 PROCEDURE — 93000 ELECTROCARDIOGRAM COMPLETE: CPT | Performed by: INTERNAL MEDICINE

## 2024-01-23 PROCEDURE — 1159F MED LIST DOCD IN RCRD: CPT | Performed by: NURSE PRACTITIONER

## 2024-01-23 PROCEDURE — 3078F DIAST BP <80 MM HG: CPT | Performed by: NURSE PRACTITIONER

## 2024-01-23 PROCEDURE — 1036F TOBACCO NON-USER: CPT | Performed by: NURSE PRACTITIONER

## 2024-01-23 PROCEDURE — 1160F RVW MEDS BY RX/DR IN RCRD: CPT | Performed by: NURSE PRACTITIONER

## 2024-01-23 PROCEDURE — 1157F ADVNC CARE PLAN IN RCRD: CPT | Performed by: NURSE PRACTITIONER

## 2024-01-23 PROCEDURE — 3074F SYST BP LT 130 MM HG: CPT | Performed by: NURSE PRACTITIONER

## 2024-01-23 PROCEDURE — 99213 OFFICE O/P EST LOW 20 MIN: CPT | Performed by: NURSE PRACTITIONER

## 2024-01-23 ASSESSMENT — ENCOUNTER SYMPTOMS
OCCASIONAL FEELINGS OF UNSTEADINESS: 0
LOSS OF SENSATION IN FEET: 0
DEPRESSION: 0

## 2024-01-24 ENCOUNTER — TELEPHONE (OUTPATIENT)
Dept: PRIMARY CARE | Facility: CLINIC | Age: 79
End: 2024-01-24
Payer: MEDICARE

## 2024-01-24 DIAGNOSIS — M17.12 PRIMARY OSTEOARTHRITIS OF LEFT KNEE: ICD-10-CM

## 2024-01-24 NOTE — TELEPHONE ENCOUNTER
Patient is asking for a prescription for a handicap placard if possible. The patient stated that she has been dealing with back pain and hip pain that you are aware of.

## 2024-01-25 NOTE — TELEPHONE ENCOUNTER
Spoke to Robyn and she would like the handicap placard mailed.  I let her know that I would mail it today.

## 2024-01-27 NOTE — ADDENDUM NOTE
Addended by: INES ESCOBAR on: 1/26/2024 09:12 PM     Modules accepted: Orders     oriented to person, place, time and situation

## 2024-02-19 DIAGNOSIS — M54.16 LUMBAR RADICULOPATHY: ICD-10-CM

## 2024-02-19 DIAGNOSIS — M48.061 SPINAL STENOSIS OF LUMBAR REGION, UNSPECIFIED WHETHER NEUROGENIC CLAUDICATION PRESENT: ICD-10-CM

## 2024-02-19 DIAGNOSIS — M47.816 LUMBAR SPONDYLOSIS: ICD-10-CM

## 2024-02-19 RX ORDER — OXYCODONE HYDROCHLORIDE 5 MG/1
5 TABLET ORAL 2 TIMES DAILY PRN
Qty: 60 TABLET | Refills: 0 | Status: SHIPPED | OUTPATIENT
Start: 2024-02-20 | End: 2024-03-18 | Stop reason: SDUPTHER

## 2024-02-19 NOTE — TELEPHONE ENCOUNTER
Pt is requesting refill of    oxycodone 5 mg IR 1 tablet BID Awa Zhang                                                       LV:  12/18/24                  NV:  3/18/24                OARRS reviewed with LFD:  1/21/24  #60/30 days                          Pended RX to MIKHAIL Hermosillo for transmission to pharmacy.

## 2024-02-23 ENCOUNTER — OFFICE VISIT (OUTPATIENT)
Dept: ENDOCRINOLOGY | Facility: CLINIC | Age: 79
End: 2024-02-23
Payer: MEDICARE

## 2024-02-23 VITALS
SYSTOLIC BLOOD PRESSURE: 148 MMHG | HEIGHT: 66 IN | RESPIRATION RATE: 16 BRPM | DIASTOLIC BLOOD PRESSURE: 70 MMHG | HEART RATE: 76 BPM | BODY MASS INDEX: 25.84 KG/M2 | WEIGHT: 160.8 LBS

## 2024-02-23 DIAGNOSIS — E05.90 SUBCLINICAL HYPERTHYROIDISM: Primary | ICD-10-CM

## 2024-02-23 DIAGNOSIS — E06.3 HASHIMOTO'S THYROIDITIS: ICD-10-CM

## 2024-02-23 DIAGNOSIS — E04.2 MULTIPLE THYROID NODULES: ICD-10-CM

## 2024-02-23 DIAGNOSIS — M85.80 OSTEOPENIA, UNSPECIFIED LOCATION: ICD-10-CM

## 2024-02-23 PROCEDURE — 99214 OFFICE O/P EST MOD 30 MIN: CPT | Performed by: INTERNAL MEDICINE

## 2024-02-23 PROCEDURE — 3078F DIAST BP <80 MM HG: CPT | Performed by: INTERNAL MEDICINE

## 2024-02-23 PROCEDURE — 1160F RVW MEDS BY RX/DR IN RCRD: CPT | Performed by: INTERNAL MEDICINE

## 2024-02-23 PROCEDURE — 1159F MED LIST DOCD IN RCRD: CPT | Performed by: INTERNAL MEDICINE

## 2024-02-23 PROCEDURE — 1036F TOBACCO NON-USER: CPT | Performed by: INTERNAL MEDICINE

## 2024-02-23 PROCEDURE — 3077F SYST BP >= 140 MM HG: CPT | Performed by: INTERNAL MEDICINE

## 2024-02-23 PROCEDURE — 1157F ADVNC CARE PLAN IN RCRD: CPT | Performed by: INTERNAL MEDICINE

## 2024-02-23 ASSESSMENT — ENCOUNTER SYMPTOMS
DIARRHEA: 0
HEADACHES: 0
CHILLS: 0
VOMITING: 0
FEVER: 0
SHORTNESS OF BREATH: 0
PALPITATIONS: 0
COUGH: 0
NAUSEA: 0
FATIGUE: 0

## 2024-02-23 NOTE — PROGRESS NOTES
Endocrinology: Follow up visit  Subjective   Patient ID: Robyn Madera is a 79 y.o. female who presents for Hyperthyroidism and Goiter.    PCP: Reanna Martinez MD    HPI  Last seen a year ago.   In the fall tsh was off more than usual so did work up: abs + hashimotos only.  Us in June 2023 was stable with no further eval needed so not repeated.   Uptake and scan in December normal.   Watching levels for now.     Continues to deal with spine issues: pain regimen changed but doing ok.   Deals with chronic anxiety as well    Review of Systems   Constitutional:  Negative for chills, fatigue and fever.   Respiratory:  Negative for cough and shortness of breath.    Cardiovascular:  Negative for chest pain and palpitations.   Gastrointestinal:  Negative for diarrhea, nausea and vomiting.   Neurological:  Negative for headaches.       Patient Active Problem List   Diagnosis    Agatston CAC score, >400    Benign essential hypertension    Bilateral hearing loss    Chronic constipation    Other specified disease of esophagus    Hashimoto's thyroiditis    Hip pain, bilateral    Mixed hyperlipidemia    Impaired fasting glucose    Lumbar spondylolysis    Multiple thyroid nodules    Osteopenia    Pelvic floor dysfunction    Primary osteoarthritis of right hip    PVC's (premature ventricular contractions)    RBBB (right bundle branch block with left anterior fascicular block)    Spinal stenosis of lumbar region    Subclinical hyperthyroidism    Tinnitus of both ears    Generalized anxiety disorder    Lumbosacral spondylosis    Primary osteoarthritis of left knee    Hiatal hernia    Chronic lumbar radiculopathy    Lumbar scoliosis    Increased frequency of urination    Microhematuria    Overactive bladder    Abdominal pain    Leg edema, right    Elevated coronary artery calcium score    Dyslipidemia        Home Meds:  Current Outpatient Medications   Medication Instructions    amLODIPine (NORVASC) 10 mg, oral, Daily     aspirin 81 mg EC tablet 1 tablet, oral, Daily    atorvastatin (LIPITOR) 40 mg, oral, Nightly    busPIRone (BUSPAR) 15 mg, oral, 2 times daily    calcium carbonate-vitamin D3 500 mg-3.125 mcg (125 unit) tablet tablet oral    cholecalciferol (Vitamin D-3) 25 MCG (1000 UT) capsule oral    docusate sodium (Colace) 100 mg capsule oral, 2 times daily    DULoxetine (CYMBALTA) 60 mg, oral, Daily, Do not crush or chew.  Will take duloxetine 60 mg and 30 mg capsule daily for a total dose of 90 mg daily.    DULoxetine (CYMBALTA) 30 mg, oral, Daily, Do not crush or chew.  Patient will take duloxetine 30 mg and 60 mg capsule daily for a total dose of 90 mg daily.    esomeprazole (NexIUM) 20 mg DR capsule 1 capsule, oral, Daily    gabapentin (NEURONTIN) 300 mg, oral, 3 times daily    mirabegron (MYRBETRIQ) 50 mg, oral, Daily    oxyCODONE (ROXICODONE) 5 mg, oral, 2 times daily PRN    polyethylene glycol (Glycolax) 17 gram/dose powder oral, Every other day        Allergies   Allergen Reactions    House Dust Hives    Adhesive Tape-Silicones Other     ADHESIVES CAUSES BLISTERS    Cat Dander Unknown    Penicillins Hives        Objective   Vitals:    02/23/24 1026   BP: 148/70   Pulse: 76   Resp: 16      Vitals:    02/23/24 1026   Weight: 72.9 kg (160 lb 12.8 oz)      Body mass index is 25.95 kg/m².   Physical Exam  Constitutional:       Appearance: Normal appearance. She is normal weight.   HENT:      Head: Normocephalic and atraumatic.   Neck:      Thyroid: No thyroid mass, thyromegaly or thyroid tenderness.   Cardiovascular:      Rate and Rhythm: Normal rate and regular rhythm.      Heart sounds: No murmur heard.     No gallop.   Pulmonary:      Effort: Pulmonary effort is normal.      Breath sounds: Normal breath sounds.   Abdominal:      Palpations: Abdomen is soft.      Comments: benign   Neurological:      General: No focal deficit present.      Mental Status: She is alert and oriented to person, place, and time.      Deep  Tendon Reflexes: Reflexes are normal and symmetric.   Psychiatric:         Behavior: Behavior is cooperative.         Labs:  Lab Results   Component Value Date    HGBA1C 6.1 (H) 11/20/2023    LDLDIRECT 54 08/14/2023    TSH 0.09 (L) 11/30/2023    FREET4 0.78 11/30/2023      Lab Results   Component Value Date    THYROIDPAB >1,000 (H) 11/30/2023    TSI 1.1 11/30/2023        Assessment/Plan   Problem List Items Addressed This Visit       Hashimoto's thyroiditis    Multiple thyroid nodules    Osteopenia    Subclinical hyperthyroidism - Primary    Relevant Orders    Thyroxine, Free    Thyroid Stimulating Hormone    Triiodothyronine, Free   Subclinical hyperthyroidism:  -uptake normal, continue to monitor levels  MNG:  Repeat us in 2028  Osteopenia:  Excellent in 2023, repeat in 2025      Electronically signed by:  Roselia Rivera MD 02/23/24 10:59 AM

## 2024-02-23 NOTE — PATIENT INSTRUCTIONS
Blood work when able  Plan based on results: follow up in 6 months or one year  Call or message with any concerns or questions

## 2024-02-27 ENCOUNTER — TELEPHONE (OUTPATIENT)
Dept: CARDIOLOGY | Facility: CLINIC | Age: 79
End: 2024-02-27

## 2024-02-27 ENCOUNTER — OFFICE VISIT (OUTPATIENT)
Dept: UROLOGY | Facility: CLINIC | Age: 79
End: 2024-02-27
Payer: MEDICARE

## 2024-02-27 DIAGNOSIS — N32.81 OAB (OVERACTIVE BLADDER): Primary | ICD-10-CM

## 2024-02-27 DIAGNOSIS — R31.29 MICROSCOPIC HEMATURIA: ICD-10-CM

## 2024-02-27 PROCEDURE — 1159F MED LIST DOCD IN RCRD: CPT | Performed by: STUDENT IN AN ORGANIZED HEALTH CARE EDUCATION/TRAINING PROGRAM

## 2024-02-27 PROCEDURE — 1160F RVW MEDS BY RX/DR IN RCRD: CPT | Performed by: STUDENT IN AN ORGANIZED HEALTH CARE EDUCATION/TRAINING PROGRAM

## 2024-02-27 PROCEDURE — 1157F ADVNC CARE PLAN IN RCRD: CPT | Performed by: STUDENT IN AN ORGANIZED HEALTH CARE EDUCATION/TRAINING PROGRAM

## 2024-02-27 PROCEDURE — 1036F TOBACCO NON-USER: CPT | Performed by: STUDENT IN AN ORGANIZED HEALTH CARE EDUCATION/TRAINING PROGRAM

## 2024-02-27 PROCEDURE — 99214 OFFICE O/P EST MOD 30 MIN: CPT | Performed by: STUDENT IN AN ORGANIZED HEALTH CARE EDUCATION/TRAINING PROGRAM

## 2024-02-27 RX ORDER — MIRABEGRON 50 MG/1
50 TABLET, EXTENDED RELEASE ORAL DAILY
Qty: 90 TABLET | Refills: 3 | Status: SHIPPED | OUTPATIENT
Start: 2024-02-27 | End: 2025-02-21

## 2024-02-27 NOTE — PROGRESS NOTES
HISTORY OF PRESENT ILLNESS:  Robyn is a 79 year old female who presents today for a follow up visit regarding her overactive bladder and hematuria. She reports she has been taking her medications. She would like to continue on medications and requests samples of Myrbetriq.          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 (10/29/2014), Disease of thyroid gland (2018), Diverticulosis of colon (06/17/2013), Diverticulosis of intestine, part unspecified, without perforation or abscess without bleeding, Gastric polyp (06/23/2014), GERD (gastroesophageal reflux disease) (2008), Glaucoma suspect, both eyes (01/08/2015), HL (hearing loss) (2022), squamous cell carcinoma of skin (06/09/2015), Hypertension (2015), Internal hemorrhoid (03/21/2016), Low TSH level (03/10/2023), Lung nodule, solitary (03/10/2023), Lung nodule, solitary (03/10/2023), Mild intermittent asthma without complication (02/24/2016), Other microscopic hematuria (03/01/2018), Other specified abnormal findings of blood chemistry (10/19/2021), Other specified abnormal findings of blood chemistry (10/19/2021), Pain in right knee, Personal history of other diseases of the digestive system, Personal history of other diseases of the musculoskeletal system and connective tissue (01/17/2020), Personal history of other diseases of the musculoskeletal system and connective tissue, Personal history of other diseases of the musculoskeletal system and connective tissue, Personal history of other diseases of the nervous system and sense organs, Personal history of other diseases of the nervous system and sense organs, Personal history of other diseases of the  "respiratory system, Personal history of other endocrine, nutritional and metabolic disease, Personal history of other endocrine, nutritional and metabolic disease, Personal history of other endocrine, nutritional and metabolic disease, Personal history of other malignant neoplasm of skin, Personal history of other mental and behavioral disorders, Presence of right artificial knee joint (09/21/2017), Scoliosis (2008), Visual impairment (2008), and Wrist sprain (03/10/2023).    Surgical History  She has a past surgical history that includes Knee arthroscopy w/ debridement (07/09/2016); Other surgical history (10/14/2019); and Joint replacement (2017-18).     Social History  She reports that she has never smoked. She has never used smokeless tobacco. She reports that she does not currently use alcohol. She reports that she does not use drugs.    Family History  Family History   Problem Relation Name Age of Onset    Diabetes Mother Jaci Bingham         Allergies  House dust, Adhesive tape-silicones, Cat dander, and Penicillins      A comprehensive 10+ review of systems was negative except for: see hpi                PHYSICAL EXAMINATION:  BP Readings from Last 3 Encounters:   02/23/24 148/70   01/23/24 128/78   12/18/23 149/86      Wt Readings from Last 3 Encounters:   02/23/24 72.9 kg (160 lb 12.8 oz)   01/23/24 73 kg (161 lb)   12/18/23 70.5 kg (155 lb 8 oz)      BMI: Estimated body mass index is 25.95 kg/m² as calculated from the following:    Height as of 2/23/24: 1.676 m (5' 6\").    Weight as of 2/23/24: 72.9 kg (160 lb 12.8 oz).  BSA: Estimated body surface area is 1.84 meters squared as calculated from the following:    Height as of 2/23/24: 1.676 m (5' 6\").    Weight as of 2/23/24: 72.9 kg (160 lb 12.8 oz).  HEENT: Normocephalic, atraumatic, PER EOMI, nonicteric, trachea normal, thyroid normal, oropharynx normal.  CARDIAC: regular rate & rhythm, S1 & S2 normal.  No heaves, thrills, gallops or murmurs.  LUNGS: " Clear to auscultation, no spinal or CV tenderness.  EXTREMITIES: No evidence of cyanosis, clubbing or edema.      Assessment:  78 yo with microscopic hematuria and OAB      Nocturia/OAB  -improved with gemtesa, but too expensive, wants to stop and   She has noticed improvement with the Myrbetriq when she tried to stop it, we will continue this,      Microhematuria:   Negative work-up, repeat UA 10/20/2024    Follow up in 6 months    Gino Haas MD  Scribe Attestation  By signing my name below, I, Serajanusz Vazquez, Scribe   attest that this documentation has been prepared under the direction and in the presence of Gino Haas MD.

## 2024-02-27 NOTE — TELEPHONE ENCOUNTER
2/27/24  1247  Called patient. Patient denies any SOB. Does report that the LLE >RLE with edema, that edema is gone by morning, and that she tried to put on compression stockings but that there were too difficult to put on. Patient also in favor of diuretics at this time.  At one time Lily OROZCO did suggest taking time throughout the day to  elevate lower extremities; at the time patient report she didn't have the time to do this.    Recommended to patient to try taking the time to elevate her lower extremities every 2 hours for 10-15 minutes and to call office and leave message on if her edema is improving or worsening.    Patient verbalized understanding of recommendation and reports she will try this first before moving on to other steps.         ----- Message from BERNARDO De La Cruz sent at 2/27/2024 12:25 PM EST -----  Regarding: FW: Question  I have multiple suggestions listed in my last office visit note.  Please call and see what she is agreeable to.  She had declined venous reflux ultrasound because she wouldn't want ablation/vein surgery and she had declined trying to cut back on amlodipine.    ----- Message -----  From: Humphrey Leahy  Sent: 2/27/2024  11:52 AM EST  To: BERNARDO De La Cruz; Elham Jewell RN  Subject: Question                                         Compression socks not working, left leg swelling more. Next suggestions please for her.     [phone]-leave message as well.

## 2024-02-29 ENCOUNTER — TELEPHONE (OUTPATIENT)
Dept: UROLOGY | Facility: CLINIC | Age: 79
End: 2024-02-29
Payer: MEDICARE

## 2024-02-29 NOTE — TELEPHONE ENCOUNTER
Carolyn, patient is wanting to talk with you regarding appointment change.  I tried to change, but is asking that you call  Thank you

## 2024-03-01 ENCOUNTER — LAB (OUTPATIENT)
Dept: LAB | Facility: LAB | Age: 79
End: 2024-03-01
Payer: MEDICARE

## 2024-03-01 DIAGNOSIS — R73.01 IMPAIRED FASTING GLUCOSE: ICD-10-CM

## 2024-03-01 DIAGNOSIS — E78.2 MIXED HYPERLIPIDEMIA: ICD-10-CM

## 2024-03-01 DIAGNOSIS — E05.90 SUBCLINICAL HYPERTHYROIDISM: ICD-10-CM

## 2024-03-01 DIAGNOSIS — I10 BENIGN ESSENTIAL HYPERTENSION: ICD-10-CM

## 2024-03-01 LAB
ALBUMIN SERPL BCP-MCNC: 4.3 G/DL (ref 3.4–5)
ALP SERPL-CCNC: 75 U/L (ref 33–136)
ALT SERPL W P-5'-P-CCNC: 19 U/L (ref 7–45)
ANION GAP SERPL CALC-SCNC: 9 MMOL/L (ref 10–20)
AST SERPL W P-5'-P-CCNC: 24 U/L (ref 9–39)
BILIRUB SERPL-MCNC: 0.8 MG/DL (ref 0–1.2)
BUN SERPL-MCNC: 14 MG/DL (ref 6–23)
CALCIUM SERPL-MCNC: 9 MG/DL (ref 8.6–10.3)
CHLORIDE SERPL-SCNC: 104 MMOL/L (ref 98–107)
CHOLEST SERPL-MCNC: 162 MG/DL (ref 0–199)
CHOLESTEROL/HDL RATIO: 1.9
CO2 SERPL-SCNC: 30 MMOL/L (ref 21–32)
CREAT SERPL-MCNC: 0.85 MG/DL (ref 0.5–1.05)
EGFRCR SERPLBLD CKD-EPI 2021: 71 ML/MIN/1.73M*2
ERYTHROCYTE [DISTWIDTH] IN BLOOD BY AUTOMATED COUNT: 14.1 % (ref 11.5–14.5)
GLUCOSE SERPL-MCNC: 113 MG/DL (ref 74–99)
HCT VFR BLD AUTO: 42.2 % (ref 36–46)
HDLC SERPL-MCNC: 83.9 MG/DL
HGB BLD-MCNC: 13.4 G/DL (ref 12–16)
LDLC SERPL CALC-MCNC: 63 MG/DL
MCH RBC QN AUTO: 31.2 PG (ref 26–34)
MCHC RBC AUTO-ENTMCNC: 31.8 G/DL (ref 32–36)
MCV RBC AUTO: 98 FL (ref 80–100)
NON HDL CHOLESTEROL: 78 MG/DL (ref 0–149)
NRBC BLD-RTO: 0 /100 WBCS (ref 0–0)
PLATELET # BLD AUTO: 216 X10*3/UL (ref 150–450)
POTASSIUM SERPL-SCNC: 3.8 MMOL/L (ref 3.5–5.3)
PROT SERPL-MCNC: 7.3 G/DL (ref 6.4–8.2)
RBC # BLD AUTO: 4.3 X10*6/UL (ref 4–5.2)
SODIUM SERPL-SCNC: 139 MMOL/L (ref 136–145)
T3FREE SERPL-MCNC: 3.5 PG/ML (ref 2.3–4.2)
T4 FREE SERPL-MCNC: 0.76 NG/DL (ref 0.61–1.12)
TRIGL SERPL-MCNC: 74 MG/DL (ref 0–149)
TSH SERPL-ACNC: 0.17 MIU/L (ref 0.44–3.98)
VIT B12 SERPL-MCNC: 184 PG/ML (ref 211–911)
VLDL: 15 MG/DL (ref 0–40)
WBC # BLD AUTO: 4.6 X10*3/UL (ref 4.4–11.3)

## 2024-03-01 PROCEDURE — 80061 LIPID PANEL: CPT

## 2024-03-01 PROCEDURE — 83036 HEMOGLOBIN GLYCOSYLATED A1C: CPT

## 2024-03-01 PROCEDURE — 80053 COMPREHEN METABOLIC PANEL: CPT

## 2024-03-01 PROCEDURE — 84481 FREE ASSAY (FT-3): CPT

## 2024-03-01 PROCEDURE — 82652 VIT D 1 25-DIHYDROXY: CPT

## 2024-03-01 PROCEDURE — 85027 COMPLETE CBC AUTOMATED: CPT

## 2024-03-01 PROCEDURE — 84443 ASSAY THYROID STIM HORMONE: CPT

## 2024-03-01 PROCEDURE — 84439 ASSAY OF FREE THYROXINE: CPT

## 2024-03-01 PROCEDURE — 36415 COLL VENOUS BLD VENIPUNCTURE: CPT

## 2024-03-01 PROCEDURE — 82607 VITAMIN B-12: CPT

## 2024-03-02 LAB
EST. AVERAGE GLUCOSE BLD GHB EST-MCNC: 140 MG/DL
HBA1C MFR BLD: 6.5 %

## 2024-03-03 LAB — 1,25(OH)2D3 SERPL-MCNC: 48 PG/ML (ref 19.9–79.3)

## 2024-03-05 ENCOUNTER — TELEPHONE (OUTPATIENT)
Dept: PRIMARY CARE | Facility: CLINIC | Age: 79
End: 2024-03-05
Payer: MEDICARE

## 2024-03-05 DIAGNOSIS — R73.01 IMPAIRED FASTING GLUCOSE: Primary | ICD-10-CM

## 2024-03-05 DIAGNOSIS — E53.8 B12 DEFICIENCY: ICD-10-CM

## 2024-03-05 RX ORDER — LANOLIN ALCOHOL/MO/W.PET/CERES
1000 CREAM (GRAM) TOPICAL DAILY
COMMUNITY

## 2024-03-05 NOTE — TELEPHONE ENCOUNTER
Called patient and notified her that her B12 level was low. I told the patient it is recommended she come in for a nurse visit for a B12 injection. Patient is scheduled for nurse visit next Wednesday at 10 am. Told patient to start OTC B12 1000 mcg daily. Patient voiced an understanding and I told her we will recheck her labs prior to her next appointment in June.

## 2024-03-05 NOTE — TELEPHONE ENCOUNTER
----- Message from Reanna Martinez MD sent at 3/5/2024  4:02 PM EST -----  Regarding: FW: Bloodwork Done In Error   Contact: [phone]  B12 must have come in after earlier results.  Please notify patient b12 is low  Recommend nurse visit for b12 injection  Start OTC b12 1000 mcg daily  Will add b12 level to labs in June    ----- Message -----  From: Otis Watson MA  Sent: 3/5/2024   3:28 PM EST  To: Reanna Martinez MD  Subject: FW: Bloodwork Done In Error                      IB was wondering if you wanted to address her low B12 level, the results came into her box and she was curious.   ----- Message -----  From: Reanna Martinez MD  Sent: 3/5/2024   2:53 PM EST  To: #  Subject: FW: Bloodwork Done In Error                      Labs look good despite being nonfasting  Just needs A1c and BMP fasting before OV in June  order in chart   ----- Message -----  From: Fernando Kimball CMA  Sent: 3/5/2024   7:38 AM EST  To: Reanna Martinez MD  Subject: FW: Bloodwork Done In Error                        ----- Message -----  From: Robyn Madera  Sent: 3/4/2024   8:02 PM EST  To: #  Subject: Bloodwork Done In Error                          Not sure why or how Dr. Martinez’s bloodwork order was combined with Dr Rivera’s  request for a thyroid panel to be done. That is all I requested when signing in on a busy  last Fri. Not sure how accurate the values are for Dr Martinez’s panels as my blood draw at 10:30 am was non fasting. Would you please be so good to share bloodwork outcomes with DR Martinez? Not sure why sent to you as the order was her request. I have a 6mos follow up scheduled with Dr Rivera. If Dr Martinez would like another fasting blood work before her June appt, would she please write a new order and let me know what she decides. Thank you for your assistance with this mix up.

## 2024-03-13 ENCOUNTER — APPOINTMENT (OUTPATIENT)
Dept: PRIMARY CARE | Facility: CLINIC | Age: 79
End: 2024-03-13
Payer: MEDICARE

## 2024-03-18 ENCOUNTER — TELEPHONE (OUTPATIENT)
Dept: CARDIOLOGY | Facility: CLINIC | Age: 79
End: 2024-03-18

## 2024-03-18 ENCOUNTER — OFFICE VISIT (OUTPATIENT)
Dept: PAIN MEDICINE | Facility: HOSPITAL | Age: 79
End: 2024-03-18
Payer: MEDICARE

## 2024-03-18 ENCOUNTER — CLINICAL SUPPORT (OUTPATIENT)
Dept: PRIMARY CARE | Facility: CLINIC | Age: 79
End: 2024-03-18
Payer: MEDICARE

## 2024-03-18 VITALS
HEART RATE: 87 BPM | DIASTOLIC BLOOD PRESSURE: 78 MMHG | WEIGHT: 159 LBS | BODY MASS INDEX: 25.55 KG/M2 | OXYGEN SATURATION: 99 % | RESPIRATION RATE: 16 BRPM | HEIGHT: 66 IN | SYSTOLIC BLOOD PRESSURE: 144 MMHG

## 2024-03-18 DIAGNOSIS — M54.16 LUMBAR RADICULOPATHY: ICD-10-CM

## 2024-03-18 DIAGNOSIS — Z79.891 LONG TERM (CURRENT) USE OF OPIATE ANALGESIC: Primary | ICD-10-CM

## 2024-03-18 DIAGNOSIS — M48.062 SPINAL STENOSIS OF LUMBAR REGION WITH NEUROGENIC CLAUDICATION: ICD-10-CM

## 2024-03-18 DIAGNOSIS — M79.2 NEUROPATHIC PAIN: ICD-10-CM

## 2024-03-18 DIAGNOSIS — M47.816 LUMBAR SPONDYLOSIS: ICD-10-CM

## 2024-03-18 DIAGNOSIS — M17.12 PRIMARY OSTEOARTHRITIS OF LEFT KNEE: ICD-10-CM

## 2024-03-18 DIAGNOSIS — M48.061 SPINAL STENOSIS OF LUMBAR REGION, UNSPECIFIED WHETHER NEUROGENIC CLAUDICATION PRESENT: ICD-10-CM

## 2024-03-18 DIAGNOSIS — E53.8 B12 DEFICIENCY: ICD-10-CM

## 2024-03-18 DIAGNOSIS — M16.11 PRIMARY OSTEOARTHRITIS OF RIGHT HIP: ICD-10-CM

## 2024-03-18 LAB
AMPHETAMINES UR QL SCN: NORMAL
BARBITURATES UR QL SCN: NORMAL
BZE UR QL SCN: NORMAL
CANNABINOIDS UR QL SCN: NORMAL
CREAT UR-MCNC: 40.1 MG/DL (ref 20–320)
ETHANOL ?TM UR-MCNC: <10 MG/DL
PCP UR QL SCN: NORMAL

## 2024-03-18 PROCEDURE — 80307 DRUG TEST PRSMV CHEM ANLYZR: CPT | Mod: PORLAB | Performed by: PHYSICAL MEDICINE & REHABILITATION

## 2024-03-18 PROCEDURE — 96372 THER/PROPH/DIAG INJ SC/IM: CPT | Performed by: FAMILY MEDICINE

## 2024-03-18 PROCEDURE — 99214 OFFICE O/P EST MOD 30 MIN: CPT | Performed by: PHYSICAL MEDICINE & REHABILITATION

## 2024-03-18 PROCEDURE — 80348 DRUG SCREENING BUPRENORPHINE: CPT | Mod: 91 | Performed by: PHYSICAL MEDICINE & REHABILITATION

## 2024-03-18 PROCEDURE — 1159F MED LIST DOCD IN RCRD: CPT | Performed by: PHYSICAL MEDICINE & REHABILITATION

## 2024-03-18 PROCEDURE — 3078F DIAST BP <80 MM HG: CPT | Performed by: PHYSICAL MEDICINE & REHABILITATION

## 2024-03-18 PROCEDURE — 1160F RVW MEDS BY RX/DR IN RCRD: CPT | Performed by: PHYSICAL MEDICINE & REHABILITATION

## 2024-03-18 PROCEDURE — 3077F SYST BP >= 140 MM HG: CPT | Performed by: PHYSICAL MEDICINE & REHABILITATION

## 2024-03-18 PROCEDURE — 82570 ASSAY OF URINE CREATININE: CPT | Mod: 59 | Performed by: PHYSICAL MEDICINE & REHABILITATION

## 2024-03-18 PROCEDURE — 80372 DRUG SCREENING TAPENTADOL: CPT | Mod: PORLAB | Performed by: PHYSICAL MEDICINE & REHABILITATION

## 2024-03-18 PROCEDURE — 1157F ADVNC CARE PLAN IN RCRD: CPT | Performed by: PHYSICAL MEDICINE & REHABILITATION

## 2024-03-18 PROCEDURE — 1036F TOBACCO NON-USER: CPT | Performed by: PHYSICAL MEDICINE & REHABILITATION

## 2024-03-18 RX ORDER — OXYCODONE HYDROCHLORIDE 5 MG/1
5 TABLET ORAL 2 TIMES DAILY PRN
Qty: 60 TABLET | Refills: 0 | Status: SHIPPED | OUTPATIENT
Start: 2024-03-21 | End: 2024-04-19 | Stop reason: SDUPTHER

## 2024-03-18 RX ORDER — DULOXETIN HYDROCHLORIDE 60 MG/1
60 CAPSULE, DELAYED RELEASE ORAL DAILY
Qty: 90 CAPSULE | Refills: 0 | Status: SHIPPED | OUTPATIENT
Start: 2024-03-29 | End: 2024-06-27

## 2024-03-18 RX ORDER — DULOXETIN HYDROCHLORIDE 30 MG/1
30 CAPSULE, DELAYED RELEASE ORAL DAILY
Qty: 90 CAPSULE | Refills: 0 | Status: SHIPPED | OUTPATIENT
Start: 2024-03-29 | End: 2024-06-27

## 2024-03-18 RX ORDER — CYANOCOBALAMIN 1000 UG/ML
1000 INJECTION, SOLUTION INTRAMUSCULAR; SUBCUTANEOUS ONCE
Status: COMPLETED | OUTPATIENT
Start: 2024-03-18 | End: 2024-03-18

## 2024-03-18 RX ADMIN — CYANOCOBALAMIN 1000 MCG: 1000 INJECTION, SOLUTION INTRAMUSCULAR; SUBCUTANEOUS at 14:46

## 2024-03-18 ASSESSMENT — ENCOUNTER SYMPTOMS
LOSS OF SENSATION IN FEET: 1
DEPRESSION: 0
OCCASIONAL FEELINGS OF UNSTEADINESS: 1

## 2024-03-18 ASSESSMENT — COLUMBIA-SUICIDE SEVERITY RATING SCALE - C-SSRS
6. HAVE YOU EVER DONE ANYTHING, STARTED TO DO ANYTHING, OR PREPARED TO DO ANYTHING TO END YOUR LIFE?: NO
1. IN THE PAST MONTH, HAVE YOU WISHED YOU WERE DEAD OR WISHED YOU COULD GO TO SLEEP AND NOT WAKE UP?: NO
2. HAVE YOU ACTUALLY HAD ANY THOUGHTS OF KILLING YOURSELF?: NO

## 2024-03-18 ASSESSMENT — PATIENT HEALTH QUESTIONNAIRE - PHQ9
2. FEELING DOWN, DEPRESSED OR HOPELESS: NOT AT ALL
SUM OF ALL RESPONSES TO PHQ9 QUESTIONS 1 AND 2: 0
1. LITTLE INTEREST OR PLEASURE IN DOING THINGS: NOT AT ALL

## 2024-03-18 NOTE — PROGRESS NOTES
"Subjective   Patient ID: Robyn Madera is a 79 y.o. female who presents for No chief complaint on file..  HPI    Location of Pain: Patient returns to the office for interval re-evaluation of \"lower back pain intermittent sciatic pain, periods of neuropathy to bilateral legs left worse than right. Neuropathy occasionally goes all the wqay to left foot, described as numbness.\" The pain is \"constant\" but varies in severity and continues to be worsened with activities of daily living while relieved by prescription medication.     Pain Score: 4/10     Treatment: Oxycodone 5mg BID  Medication count: 14 pills   Last doses taken: 3/18/24 1 dose and takes BID  Fill date: 2/19/24     Efficacy: 75-80% relief for 6 hours depending on the day     Narcan: EXP. 4/24- patient aware to get new Narcan-states she will get it through [employer]     Other medications/neuromodulators: Gabapentin does not need refill-no refill needed, Cymbalta 60mg NEEDS REFILL     Injections and/or Procedures: none recent      Other:  does home exercises    79-year-old female presents for follow-up of lumbar radicular pain in the setting of lumbar stenosis with neurogenic claudication as well as polyarticular pain.  Pain accompanied by neuropathy lower extremities left greater than right.  Patient has noted an increase in neuropathy to her left lower extremity.  She denies any increased weakness or changes in bowel/bladder function.  Pain is described as aching and tingling.  Pain can be increased by standing and walking.  Currently managing her pain with oxycodone 5 mg twice daily.  Patient previously weaned down from OxyContin.  She continues to endorse 70% relief with this dose of oxycodone.  She denies any adverse effects.  Continued benefit with gabapentin 300 mg 3 times daily and duloxetine 90 mg daily, doing better with increased dose of duloxetine.  Limited relief with previous injections.  She feels that the current medication regimen is " adequately controlling her pain and allowing her to be active.                  OARRS:  Cooper Merchant MD on 3/18/2024 10:02 AM  I have personally reviewed the OARRS report for Robyn Madera. I have considered the risks of abuse, dependence, addiction and diversion and I believe that it is clinically appropriate for Robyn Madera to be prescribed this medication    Is the patient prescribed a combination of a benzodiazepine and opioid?  No    Last Urine Drug Screen / ordered today: Yes  Recent Results (from the past 8760 hour(s))   OPIATE/OPIOID/BENZO PRESCRIPTION COMPLIANCE    Collection Time: 09/21/23 11:24 AM   Result Value Ref Range    DRUG SCREEN COMMENT URINE SEE BELOW     Creatine, Urine 120.4 mg/dL    Amphetamine Screen, Urine PRESUMPTIVE NEGATIVE NEGATIVE    Barbiturate Screen, Urine PRESUMPTIVE NEGATIVE NEGATIVE    Cannabinoid Screen, Urine PRESUMPTIVE NEGATIVE NEGATIVE    Cocaine Screen, Urine PRESUMPTIVE NEGATIVE NEGATIVE    PCP Screen, Urine PRESUMPTIVE NEGATIVE NEGATIVE    7-Aminoclonazepam <25 Cutoff <25 ng/mL    Alpha-Hydroxyalprazolam <25 Cutoff <25 ng/mL    Alpha-Hydroxymidazolam <25 Cutoff <25 ng/mL    Alprazolam <25 Cutoff <25 ng/mL    Chlordiazepoxide <25 Cutoff <25 ng/mL    Clonazepam <25 Cutoff <25 ng/mL    Diazepam <25 Cutoff <25 ng/mL    Lorazepam <25 Cutoff <25 ng/mL    Midazolam <25 Cutoff <25 ng/mL    Nordiazepam <25 Cutoff <25 ng/mL    Oxazepam <25 Cutoff <25 ng/mL    Temazepam <25 Cutoff <25 ng/mL    Zolpidem <25 Cutoff <25 ng/mL    Zolpidem Metabolite (ZCA) <25 Cutoff <25 ng/mL    6-Acetylmorphine <25 Cutoff <25 ng/mL    Codeine <50 Cutoff <50 ng/mL    Hydrocodone <25 Cutoff <25 ng/mL    Hydromorphone <25 Cutoff <25 ng/mL    Morphine Urine <50 Cutoff <50 ng/mL    Norhydrocodone <25 Cutoff <25 ng/mL    Noroxycodone >1000 (A) Cutoff <25 ng/mL    Oxycodone 1,856 (A) Cutoff <25 ng/mL    Oxymorphone 333 (A) Cutoff <25 ng/mL    Tramadol <50 Cutoff <50 ng/mL    O-Desmethyltramadol  <50 Cutoff <50 ng/mL    Fentanyl <2.5 Cutoff<2.5 ng/mL    Norfentanyl <2.5 Cutoff<2.5 ng/mL    METHADONE CONFIRMATION,URINE <25 Cutoff <25 ng/mL    EDDP <25 Cutoff <25 ng/mL   Tapentadol Confirmation, Urine    Collection Time: 09/21/23 11:24 AM   Result Value Ref Range    Tapentadol <25 Cutoff <25 ng/mL    N-Desmethyltapentadol <25 Cutoff <25 ng/mL   Buprenorphine Confirm,Urine    Collection Time: 09/21/23 11:24 AM   Result Value Ref Range    BUPRENORPHINE GLUC, URINE <5 ng/mL    BUPRENORPHINE ,URINE <2 ng/mL    NALOXONE, URINE <100 ng/mL    NORBUPRENORPHINE GLUC,URINE <5 ng/mL    NORBUPRENORPHINE, URINE <2 ng/mL     Results are as expected.     Controlled Substance Agreement:  Date of the Last Agreement: 9/21/23  Reviewed Controlled Substance Agreement including but not limited to the benefits, risks, and alternatives to treatment with a Controlled Substance medication(s).    Monitoring and compliance:    ORT:    PDUQ:    Office Agreement:      Review of Systems     Current Outpatient Medications   Medication Instructions    amLODIPine (NORVASC) 10 mg, oral, Daily    aspirin 81 mg EC tablet 1 tablet, oral, Daily    atorvastatin (LIPITOR) 40 mg, oral, Nightly    busPIRone (BUSPAR) 15 mg, oral, 2 times daily    calcium carbonate-vitamin D3 500 mg-3.125 mcg (125 unit) tablet tablet oral    cholecalciferol (Vitamin D-3) 25 MCG (1000 UT) capsule oral    cyanocobalamin (VITAMIN B-12) 1,000 mcg, oral, Daily    docusate sodium (Colace) 100 mg capsule oral, 2 times daily    DULoxetine (CYMBALTA) 60 mg, oral, Daily, Do not crush or chew.  Will take duloxetine 60 mg and 30 mg capsule daily for a total dose of 90 mg daily.    DULoxetine (CYMBALTA) 30 mg, oral, Daily, Do not crush or chew.  Patient will take duloxetine 30 mg and 60 mg capsule daily for a total dose of 90 mg daily.    esomeprazole (NexIUM) 20 mg DR capsule 1 capsule, oral, Daily    gabapentin (NEURONTIN) 300 mg, oral, 3 times daily    mirabegron (MYRBETRIQ) 50  mg, oral, Daily    mirabegron (MYRBETRIQ) 50 mg, oral, Daily    oxyCODONE (ROXICODONE) 5 mg, oral, 2 times daily PRN    polyethylene glycol (Glycolax) 17 gram/dose powder oral, Every other day        Past Medical History:   Diagnosis Date    Abnormal findings on diagnostic imaging of heart and coronary circulation 11/07/2019    Abnormal Heart Score CT    Abnormal levels of other serum enzymes 10/24/2019    Abnormal cardiac enzyme level    Allergic rhinitis due to animal hair or dander 10/28/2013    Anxiety 2023    Arthritis 2016    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/2014    Disease of thyroid gland 2018    Diverticulosis of colon 06/17/2013    Diverticulosis of intestine, part unspecified, without perforation or abscess without bleeding     Diverticulosis    Gastric polyp 06/23/2014    GERD (gastroesophageal reflux disease) 2008    Glaucoma suspect, both eyes 01/08/2015    HL (hearing loss) 2022    Hx of squamous cell carcinoma of skin 06/09/2015    Hypertension 2015    Internal hemorrhoid 03/21/2016    Low TSH level 03/10/2023    Lung nodule, solitary 03/10/2023    Lung nodule, solitary 03/10/2023    Stable CT 6/23  No follow up needed    Mild intermittent asthma without complication 02/24/2016    Other microscopic hematuria 03/01/2018    Microscopic hematuria    Other specified abnormal findings of blood chemistry 10/19/2021    Elevated LFTs    Other specified abnormal findings of blood chemistry 10/19/2021    Elevated LFTs    Pain in right knee     Right knee pain    Personal history of other diseases of the digestive system     History of hiatal hernia    Personal history of other diseases of the musculoskeletal system and connective tissue 01/17/2020    History of osteopenia    Personal history of other diseases of the musculoskeletal system and connective tissue     History of herniated intervertebral disc    Personal history of other diseases of the musculoskeletal system and connective  tissue     History of chronic back pain    Personal history of other diseases of the nervous system and sense organs     History of cataract    Personal history of other diseases of the nervous system and sense organs     History of glaucoma    Personal history of other diseases of the respiratory system     History of allergic rhinitis    Personal history of other endocrine, nutritional and metabolic disease     History of hyperlipidemia    Personal history of other endocrine, nutritional and metabolic disease     History of Graves' disease    Personal history of other endocrine, nutritional and metabolic disease     History of hypoglycemia    Personal history of other malignant neoplasm of skin     History of malignant neoplasm of skin    Personal history of other mental and behavioral disorders     History of anxiety    Presence of right artificial knee joint 09/21/2017    Scoliosis 2008    Visual impairment 2008    Wrist sprain 03/10/2023        Past Surgical History:   Procedure Laterality Date    JOINT REPLACEMENT  2017-18    KNEE ARTHROSCOPY W/ DEBRIDEMENT  07/09/2016    Arthroscopy Knee    OTHER SURGICAL HISTORY  10/14/2019    Excision of squamous cell carcinoma        Family History   Problem Relation Name Age of Onset    Diabetes Mother Jaci Bingham         Allergies   Allergen Reactions    House Dust Hives    Adhesive Tape-Silicones Other     ADHESIVES CAUSES BLISTERS    Cat Dander Unknown    Penicillins Hives        Objective     There were no vitals filed for this visit.     Physical Exam    GENERAL EXAM  Vital Signs: Vital signs to include heart rate, respiration rate, blood pressure, and temperature were reviewed.  General Appearance:  Awake, alert, healthy appearing, well developed, No acute distress.  Head: Normocephalic without evidence of head injury.  Neck: The appearance of the neck was normal without swelling with a midline trachea.  Eyes: The eyelids and eyebrows exhibited no abnormalities.   Pupils were not pin-point.  Sclera was without icterus.  Lungs: Respiration rhythm and depth was normal.  Respiratory movements were normal without labored breathing.  Cardiovascular: No peripheral edema was present.    Neurological: Patient was oriented to time, place, and person.  Speech was normal.  Balance, gait, and stance were unremarkable.    Psychiatric: Appearance was normal with appropriate dress.  Mood was euthymic and affect was normal.  Skin: Affected regions were without ecchymosis or skin lesions.        Assessment/Plan   Diagnoses and all orders for this visit:  Long term (current) use of opiate analgesic  -     Buprenorphine Confirm,Urine  -     Tapentadol Confirmation, Urine  -     Alcohol, Urine  -     Opiate/Opioid/Benzo Prescription Compliance  -     OOB Internal Tracking  Spinal stenosis of lumbar region with neurogenic claudication  Primary osteoarthritis of left knee  Primary osteoarthritis of right hip  Neuropathic pain  -     DULoxetine (Cymbalta) 60 mg DR capsule; Take 1 capsule (60 mg) by mouth once daily. Do not crush or chew.  Will take duloxetine 60 mg and 30 mg capsule daily for a total dose of 90 mg daily.  Please hold until fill date Do not start before March 29, 2024.  -     DULoxetine (Cymbalta) 30 mg DR capsule; Take 1 capsule (30 mg) by mouth once daily. Do not crush or chew.  Patient will take duloxetine 30 mg and 60 mg capsule daily for a total dose of 90 mg daily.   Please hold until fill date Do not start before March 29, 2024.  Spinal stenosis of lumbar region, unspecified whether neurogenic claudication present  -     oxyCODONE (Roxicodone) 5 mg immediate release tablet; Take 1 tablet (5 mg) by mouth 2 times a day as needed for severe pain (7 - 10). Do not start before March 21, 2024.  Lumbar radiculopathy  -     oxyCODONE (Roxicodone) 5 mg immediate release tablet; Take 1 tablet (5 mg) by mouth 2 times a day as needed for severe pain (7 - 10). Do not start before March  21, 2024.  Lumbar spondylosis  -     oxyCODONE (Roxicodone) 5 mg immediate release tablet; Take 1 tablet (5 mg) by mouth 2 times a day as needed for severe pain (7 - 10). Do not start before March 21, 2024.    79-year-old female with history of spinal stenosis as well as polyarticular pain.  Overall patient is actually doing quite well since we have weaned her down off of her long-acting opiates and she is currently only taking oxycodone 5 mg twice daily as well as gabapentin 300 mg 3 times daily and doing better with an increased dose of 90 mg of duloxetine without significant side effects.  OARRS reviewed no issues.  Our plan for today:  - Will continue current regimen of oxycodone 5 mg supplementing with gabapentin and duloxetine as above.  She continues to report significant benefit and this regimen is keeping her very functional.  - I did instruct patient to start supplementing her midday regimen with some 500 mg Tylenol 1-2.  - Urine drug screen updated today and controlled substance agreement up-to-date.  - She will follow-up with my nurse practitioner in 3 months and I will see her again in 6 months.       This note was generated with the aid of dictation software, there may be typos despite my attempts at proofreading.

## 2024-03-18 NOTE — TELEPHONE ENCOUNTER
Patient left a voicemail that she had spoken with Lily on 2/27/24 due to lower extremity swelling and Lily suggested elevation and compression stockings. She reports this hasn't helped and swelling is a little worse now. She saw pain management and they thought maybe she should see someone in vascular like Dr Tolbert which will require a referral. She will be seeing her PCP this afternoon at 2:30 pm and will discuss with PCP as well. She will be in and out for doctors appt today.

## 2024-03-21 LAB
BUPRENORPHINE UR-MCNC: <2 NG/ML
BUPRENORPHINE UR-MCNC: <5 NG/ML
NALOXONE UR CFM-MCNC: <100 NG/ML
NORBUPRENORPHINE UR CFM-MCNC: <5 NG/ML
NORBUPRENORPHINE UR-MCNC: <2 NG/ML
NORTAPENTADOL UR CFM-MCNC: <25 NG/ML
TAPENTADOL UR CFM-MCNC: <25 NG/ML

## 2024-03-27 NOTE — TELEPHONE ENCOUNTER
3/28/24  1430  Patient called back and informed that the elevation and compression stockings not helping, that left left worse than right leg.  Patient was requesting a referral to Dr. Tolbert.    Informed Lily OROZCO of patient status and request:    ADRIANO Bautista,  I just spoke to her and her LE are still swollen. The elevation and stockings are not helping. She is due to see her PCP soon,but wondered if she can be referred to Dr. Tolbert?      Especially the left leg    Lily Rajan, ELENI-CNP  needs to have venous reflux duplex and yes can refer to dr tolbert    LH  Order entered for venous reflux US and referral to Dr. Tolbert.     Called and informed patient of plan for venous study and referral to Dr. Tolbert; is agreeable to plan.      Called and informed Elham of study and referral; Elham to schedule both study appt with Dr. Tolbert.        3/27/24  1708  Called patient to inquire if LE edema improved; no answer.    Left voice message for patient to return call.

## 2024-03-28 DIAGNOSIS — R60.0 BILATERAL LEG EDEMA: ICD-10-CM

## 2024-03-28 DIAGNOSIS — R60.0 LEG EDEMA, RIGHT: Primary | ICD-10-CM

## 2024-04-10 ENCOUNTER — OFFICE VISIT (OUTPATIENT)
Dept: VASCULAR SURGERY | Facility: CLINIC | Age: 79
End: 2024-04-10
Payer: MEDICARE

## 2024-04-10 VITALS
DIASTOLIC BLOOD PRESSURE: 73 MMHG | HEART RATE: 98 BPM | BODY MASS INDEX: 25.99 KG/M2 | SYSTOLIC BLOOD PRESSURE: 163 MMHG | WEIGHT: 161 LBS

## 2024-04-10 DIAGNOSIS — I87.2 VENOUS INSUFFICIENCY: Primary | ICD-10-CM

## 2024-04-10 PROCEDURE — 1157F ADVNC CARE PLAN IN RCRD: CPT | Performed by: INTERNAL MEDICINE

## 2024-04-10 PROCEDURE — 1159F MED LIST DOCD IN RCRD: CPT | Performed by: INTERNAL MEDICINE

## 2024-04-10 PROCEDURE — 3078F DIAST BP <80 MM HG: CPT | Performed by: INTERNAL MEDICINE

## 2024-04-10 PROCEDURE — 3077F SYST BP >= 140 MM HG: CPT | Performed by: INTERNAL MEDICINE

## 2024-04-10 PROCEDURE — 1036F TOBACCO NON-USER: CPT | Performed by: INTERNAL MEDICINE

## 2024-04-10 PROCEDURE — 99203 OFFICE O/P NEW LOW 30 MIN: CPT | Performed by: INTERNAL MEDICINE

## 2024-04-10 PROCEDURE — 1160F RVW MEDS BY RX/DR IN RCRD: CPT | Performed by: INTERNAL MEDICINE

## 2024-04-10 ASSESSMENT — ENCOUNTER SYMPTOMS
EYES NEGATIVE: 1
MUSCULOSKELETAL NEGATIVE: 1
ALLERGIC/IMMUNOLOGIC NEGATIVE: 1
CONSTITUTIONAL NEGATIVE: 1
GASTROINTESTINAL NEGATIVE: 1
NEUROLOGICAL NEGATIVE: 1
ENDOCRINE NEGATIVE: 1
PSYCHIATRIC NEGATIVE: 1
HEMATOLOGIC/LYMPHATIC NEGATIVE: 1
RESPIRATORY NEGATIVE: 1

## 2024-04-10 NOTE — PROGRESS NOTES
Subjective   Patient ID: Robyn Madera is a 79 y.o. female who presents for No chief complaint on file..  HPI  79 year old female with a past medical history of HTN, OA and spinal stenosis that presents to the office for leg swelling.   She complains of BLE edema, left is worse than right. She states that the swelling is worse at the end of the day. Started about 6 mths ago. She does have venous statis appearing legs. She has a scab on her left lower extremity- has healed. She can't tolerate compression stockings- however, she has use tubi socks. She states that her swelling is worse at the end of the day. She denies any previous history of a DVT.  She has a family history of varicose veins- mother. She has a venous insufficiency ultrasound scheduled for 4/25/24.       Review of Systems   Constitutional: Negative.    HENT: Negative.     Eyes: Negative.    Respiratory: Negative.     Cardiovascular:  Positive for leg swelling.   Gastrointestinal: Negative.    Endocrine: Negative.    Genitourinary: Negative.    Musculoskeletal: Negative.    Skin: Negative.    Allergic/Immunologic: Negative.    Neurological: Negative.    Hematological: Negative.    Psychiatric/Behavioral: Negative.         Objective   Physical Exam  Eyes:      Pupils: Pupils are equal, round, and reactive to light.   Cardiovascular:      Rate and Rhythm: Normal rate.   Pulmonary:      Effort: Pulmonary effort is normal.   Abdominal:      General: Bowel sounds are normal.   Musculoskeletal:         General: Swelling present. Normal range of motion.      Cervical back: Normal range of motion.   Skin:     General: Skin is warm and dry.      Capillary Refill: Capillary refill takes less than 2 seconds.   Neurological:      General: No focal deficit present.      Mental Status: She is alert.   Psychiatric:         Mood and Affect: Mood normal.         Assessment/Plan   79 year old female with a past medical history of HTN, OA and spinal stenosis that  presents to the office for leg swelling.     Plan:  Continue conservative measures such as compression and elevation  Has a VVI scheduled for 4/25/24  Follow up with Dr. Tolbert after testing           BERNARDO Saenz 04/10/24 11:25 AM

## 2024-04-19 DIAGNOSIS — M47.816 LUMBAR SPONDYLOSIS: ICD-10-CM

## 2024-04-19 DIAGNOSIS — M48.061 SPINAL STENOSIS OF LUMBAR REGION, UNSPECIFIED WHETHER NEUROGENIC CLAUDICATION PRESENT: ICD-10-CM

## 2024-04-19 DIAGNOSIS — M54.16 LUMBAR RADICULOPATHY: ICD-10-CM

## 2024-04-19 RX ORDER — OXYCODONE HYDROCHLORIDE 5 MG/1
5 TABLET ORAL 2 TIMES DAILY PRN
Qty: 60 TABLET | Refills: 0 | Status: SHIPPED | OUTPATIENT
Start: 2024-04-20 | End: 2024-05-16 | Stop reason: SDUPTHER

## 2024-04-19 NOTE — TELEPHONE ENCOUNTER
Requested refill for Oxycodone 5mg BID #60 for 30 days.  OARRS verified LFD: 3/21/24  Next SD: 4/20/24  LV: 3/18/24  NV: 6/13/24  Awa Waddell

## 2024-04-25 ENCOUNTER — TELEPHONE (OUTPATIENT)
Dept: VASCULAR SURGERY | Facility: CLINIC | Age: 79
End: 2024-04-25

## 2024-04-25 ENCOUNTER — HOSPITAL ENCOUNTER (OUTPATIENT)
Dept: VASCULAR MEDICINE | Facility: HOSPITAL | Age: 79
Discharge: HOME | End: 2024-04-25
Payer: MEDICARE

## 2024-04-25 DIAGNOSIS — M79.89 OTHER SPECIFIED SOFT TISSUE DISORDERS: ICD-10-CM

## 2024-04-25 DIAGNOSIS — R60.0 BILATERAL LEG EDEMA: ICD-10-CM

## 2024-04-25 PROCEDURE — 93970 EXTREMITY STUDY: CPT | Performed by: SURGERY

## 2024-04-25 PROCEDURE — 93970 EXTREMITY STUDY: CPT

## 2024-04-25 NOTE — TELEPHONE ENCOUNTER
----- Message from BERNARDO De La Cruz sent at 4/25/2024  3:23 PM EDT -----    ----- Message -----  From: Tosha, Syngo - Cardiology Results In  Sent: 4/25/2024   3:18 PM EDT  To: BERNARDO De La Cruz

## 2024-04-25 NOTE — TELEPHONE ENCOUNTER
Result Communication    Resulted Orders   Vascular US Lower Extremity Venous Insufficiency Bilateral    [facility]  [address] [id number]       Phone [phone] Fax [phone]       Vascular Lab Report     VASC US LOWER EXTREMITY VENOUS INSUFFICIENCY BILATERAL    Patient Name:      CHELY BAMBI WILLISBEAN   Janel Physician: [id number] Jagjit Didi STODDARD  Study Date:        4/25/2024           Ordering Provider: [id number] OJNATHAN IBARRA  MRN/PID:           [id number]            Fellow:  Accession#:        [id number]        Technologist:      Jhoana Villarreal RVT  Date of Birth/Age: 1945 / 79 years Technologist 2:  Gender:            F                   Encounter#:        [id number]  Admission Status:  Outpatient          Location           [facility]                                         Performed:       Diagnosis/ICD:    Other specified soft tissue disorders-M79.89  CPT Codes:        34577 Venous reflux study VV VI complete  Patient Position: Study performed in a reverse Trendelenburg position.       CONCLUSIONS:  Right Lower Venous Insufficiency: Reflux is noted in the saphenofemoral junction vein. There is reflux noted in the common femoral vein. Right small saphenous vein arises proximal to the sapheno-popliteal junction.  Left Lower Venous Insufficiency: Reflux is noted in the saphenofemoral junction vein. There is reflux noted in the common femoral and proximal femoral veins. The left small saphenous vein arises proximal to the sapheno-popliteal junction.  Right Lower Venous: No evidence of acute deep vein thrombus visualized in the right lower extremity.  Left Lower Venous: No evidence of acute deep vein thrombus visualized in the left lower extremity.     Imaging & Doppler Findings:     Right          Compress Thrombus  Diam   Depth    Time  SFJ              Yes      None   6.0 mm  22.4 mm 1.31 sec  Prox Thigh GSV   Yes      None  Mid Thigh GSV    Yes      None  Knee GSV         Yes      None  Prox Calf GSV    Yes      None  Mid Calf GSV     Yes      None  Dist Calf GSV    Yes      None  SPJ              Yes      None  SSV Prox         Yes      None  SSV Mid          Yes      None  SSV Distal       Yes      None  Common FV                                       2.74 sec       Left           Compress Thrombus  Diam    Depth    Time  SFJ              Yes      None   11.4 mm 23.0 mm 2.10 sec  Prox Thigh GSV   Yes      None  Mid Thigh GSV    Yes      None  Knee GSV         Yes      None  Prox Calf GSV    Yes      None  Mid Calf GSV     Yes      None  Dist Calf GSV    Yes      None  SPJ              Yes      None  SSV Prox         Yes      None  SSV Mid          Yes      None  SSV Distal       Yes      None  Common FV                                        2.50 sec       Right                 Compressible Thrombus        Flow  Distal External Iliac                None  CFV                       Yes        None         Reflux  PFV                       Yes        None  FV Proximal               Yes        None  FV Mid                    Yes        None  FV Distal                 Yes        None  Popliteal                 Yes        None   Spontaneous/Phasic  Peroneal                  Yes        None  PTV                       Yes        None       Left                  Compress Thrombus        Flow  Distal External Iliac            None  CFV                     Yes      None         Reflux  PFV                     Yes      None  FV Proximal             Yes      None         Reflux  FV Mid                  Yes      None  FV Distal               Yes      None  Popliteal               Yes      None   Spontaneous/Phasic  Peroneal                Yes      None  PTV                     Yes      None       [id number] Jagjit Tolbert DO  Electronically signed by [id number] Jagjit Tolbert DO on 4/25/2024 at 3:18:35 PM          ** Final **         3:42 PM      Results were not successfully communicated with the patient and they did not acknowledge their understanding.    Pt has an upcoming appt with Dr. Tolbert next week. Does show deep venous reflux.

## 2024-05-02 ENCOUNTER — DOCUMENTATION (OUTPATIENT)
Dept: UROLOGY | Facility: CLINIC | Age: 79
End: 2024-05-02

## 2024-05-02 ENCOUNTER — OFFICE VISIT (OUTPATIENT)
Dept: VASCULAR SURGERY | Facility: CLINIC | Age: 79
End: 2024-05-02
Payer: MEDICARE

## 2024-05-02 VITALS — BODY MASS INDEX: 25.99 KG/M2 | SYSTOLIC BLOOD PRESSURE: 166 MMHG | DIASTOLIC BLOOD PRESSURE: 85 MMHG | WEIGHT: 161 LBS

## 2024-05-02 DIAGNOSIS — I87.2 VENOUS INSUFFICIENCY: Primary | ICD-10-CM

## 2024-05-02 PROCEDURE — 3079F DIAST BP 80-89 MM HG: CPT | Performed by: SURGERY

## 2024-05-02 PROCEDURE — 99213 OFFICE O/P EST LOW 20 MIN: CPT | Performed by: SURGERY

## 2024-05-02 PROCEDURE — 1159F MED LIST DOCD IN RCRD: CPT | Performed by: SURGERY

## 2024-05-02 PROCEDURE — 1160F RVW MEDS BY RX/DR IN RCRD: CPT | Performed by: SURGERY

## 2024-05-02 PROCEDURE — 1036F TOBACCO NON-USER: CPT | Performed by: SURGERY

## 2024-05-02 PROCEDURE — 3077F SYST BP >= 140 MM HG: CPT | Performed by: SURGERY

## 2024-05-02 PROCEDURE — 1157F ADVNC CARE PLAN IN RCRD: CPT | Performed by: SURGERY

## 2024-05-02 NOTE — PROGRESS NOTES
Subjective   Patient ID: Robyn Madera is a 79 y.o. female who presents for Follow-up (Results venous insuff ).  HPI  Patient presents office with bilateral lower extremity swelling left significantly worse than right with previous history of pretibial ulceration which currently healed  No current fevers or chills wearing Tubigrip compression with improvement  Definitely worse upon standing seated positioning  Swelling worse at the end of the day best worsening in AM  Overall no pain or discomfort noted.    Review of Systems  Review of Systems    Constitutional:  no generalized malaise overall feels well, energy levels intact, no complaints specifically noted  HEENT:  No blurry vision, no visual aides noted, no hearing loss no ear ache no nose bleeds noted, no dysphagia, no congestion otherwise no pertinent positives noted  Cardiovascular:  no palpitations, chest pain or heaviness noted, no leg swelling, no numbness or tingling in the lower extremity noted  Respiratory:  no shortness of breath, no productive cough noted, no conversation dyspnea or difficulty breathing  Gastrointestinal:  no abdominal pain, no nausea or vomiting, appetite intact, no bowel irregularities noted  Genitourinary:   no urinary incontinence, frequency or urgency issues noted, no hematuria or burning sensation issues  Musculoskeletal:  No muscle aches or pains, no joint discomfort noted, no back pain noted otherwise feels well  Skin: no ulcerations, skin color issues or wounds upper or lower extremities  Neurologic: no dizziness, no hemiplegia, no hemiparesis, no obvious visual deficits noted  Psychiatric: no depression, no memory loss noted, no suicidal ideation  Endocrine: no weight loss or gain, no temperature concerns hot or cold intolerance  Hemogolotic/Lymphatic: no bruising, excessive bleeding, no swelling in the groins or neck noted      Objective   Physical Exam  Physical exam    Constitutional: alert and in no acute distress  verbal  Eyes: No erythema swelling or discharge noted  Neck: supple, symmetric, trachea midline, no masses noted  Cardiovascular: Carotid pulses 2+, no obvious bruit, no Jugular distension noted, no thrill, heart regular rate, lower extremity vascular exam intact, cap refill <2 sec  Pulmonary:  Bilateral breath sounds intact, clear with rales rhonchi or wheeze  Abdomen: soft non tender, no pulsatile masses noted, no rebound rigidity or guarding noted  Skin: intact warm no abnormal turgor  Psychiatric: alert without any obvious cognitive issues, oriented to person, place, and time  1+ pretibial edema bilateral lower extremities left more than right    Assessment/Plan   Chronic venous insufficiency with reflux based on VI study  We did discuss briefly venogram IVUS possible stenting however at this time secondary stability of symptoms we will continue with conservative management  Compression elevation activity  Follow-up in 3 months.         Jagjit Tolbert DO 05/02/24 2:34 PM

## 2024-05-16 DIAGNOSIS — M48.061 SPINAL STENOSIS OF LUMBAR REGION, UNSPECIFIED WHETHER NEUROGENIC CLAUDICATION PRESENT: ICD-10-CM

## 2024-05-16 DIAGNOSIS — M54.16 LUMBAR RADICULOPATHY: ICD-10-CM

## 2024-05-16 DIAGNOSIS — M47.816 LUMBAR SPONDYLOSIS: ICD-10-CM

## 2024-05-16 NOTE — TELEPHONE ENCOUNTER
Pt is requesting refill of  oxycodone 5 mg 1 tablet po BID Awa Waddell                                                          LV:   3/18/24                    NV:  6/13/24               OARRS reviewed with LFD:  4/20/24  #60/30 days                           Pended RX to MIKHAIL Hermosillo for transmission to pharmacy.

## 2024-05-17 RX ORDER — OXYCODONE HYDROCHLORIDE 5 MG/1
5 TABLET ORAL 2 TIMES DAILY PRN
Qty: 60 TABLET | Refills: 0 | Status: SHIPPED | OUTPATIENT
Start: 2024-05-20 | End: 2024-06-19

## 2024-05-29 ENCOUNTER — TELEPHONE (OUTPATIENT)
Dept: PRIMARY CARE | Facility: CLINIC | Age: 79
End: 2024-05-29
Payer: MEDICARE

## 2024-05-29 NOTE — TELEPHONE ENCOUNTER
Patient called due to labs being merged together with other drs lab orders (they were only supposed to do the thyroid and not lung panel) and needing more accurate results before her appointment 6/6. She said she called last week to ask for the new lab orders to be sent in. The labs she needed to get re ordered are,  CBC  A1C  LIPIDS  VITAMIN D  TSH REFLECT TO 3T4    I checked her labs and  metabolic, B12, magnesium were not done yet of the list she gave me.      She wanted to get the labs done on 6/3 before her appointment.

## 2024-05-30 DIAGNOSIS — E53.8 B12 DEFICIENCY: ICD-10-CM

## 2024-05-30 DIAGNOSIS — R73.01 IMPAIRED FASTING GLUCOSE: ICD-10-CM

## 2024-05-30 DIAGNOSIS — E78.2 MIXED HYPERLIPIDEMIA: ICD-10-CM

## 2024-05-30 DIAGNOSIS — E06.3 HASHIMOTO'S THYROIDITIS: ICD-10-CM

## 2024-05-30 DIAGNOSIS — I10 BENIGN ESSENTIAL HYPERTENSION: Primary | ICD-10-CM

## 2024-06-03 ENCOUNTER — LAB (OUTPATIENT)
Dept: LAB | Facility: LAB | Age: 79
End: 2024-06-03
Payer: MEDICARE

## 2024-06-03 DIAGNOSIS — E05.90 SUBCLINICAL HYPERTHYROIDISM: Primary | ICD-10-CM

## 2024-06-03 DIAGNOSIS — E53.8 B12 DEFICIENCY: ICD-10-CM

## 2024-06-03 DIAGNOSIS — E05.90 SUBCLINICAL HYPERTHYROIDISM: ICD-10-CM

## 2024-06-03 DIAGNOSIS — R73.01 IMPAIRED FASTING GLUCOSE: ICD-10-CM

## 2024-06-03 DIAGNOSIS — E06.3 HASHIMOTO'S THYROIDITIS: ICD-10-CM

## 2024-06-03 DIAGNOSIS — I10 BENIGN ESSENTIAL HYPERTENSION: ICD-10-CM

## 2024-06-03 DIAGNOSIS — E78.2 MIXED HYPERLIPIDEMIA: ICD-10-CM

## 2024-06-03 LAB
ALBUMIN SERPL BCP-MCNC: 4.3 G/DL (ref 3.4–5)
ALP SERPL-CCNC: 83 U/L (ref 33–136)
ALT SERPL W P-5'-P-CCNC: 17 U/L (ref 7–45)
ANION GAP SERPL CALC-SCNC: 10 MMOL/L (ref 10–20)
AST SERPL W P-5'-P-CCNC: 20 U/L (ref 9–39)
BILIRUB SERPL-MCNC: 0.7 MG/DL (ref 0–1.2)
BUN SERPL-MCNC: 14 MG/DL (ref 6–23)
CALCIUM SERPL-MCNC: 9 MG/DL (ref 8.6–10.3)
CHLORIDE SERPL-SCNC: 104 MMOL/L (ref 98–107)
CHOLEST SERPL-MCNC: 162 MG/DL (ref 0–199)
CHOLESTEROL/HDL RATIO: 1.9
CO2 SERPL-SCNC: 30 MMOL/L (ref 21–32)
CREAT SERPL-MCNC: 0.68 MG/DL (ref 0.5–1.05)
EGFRCR SERPLBLD CKD-EPI 2021: 90 ML/MIN/1.73M*2
ERYTHROCYTE [DISTWIDTH] IN BLOOD BY AUTOMATED COUNT: 14 % (ref 11.5–14.5)
EST. AVERAGE GLUCOSE BLD GHB EST-MCNC: 137 MG/DL
GLUCOSE SERPL-MCNC: 135 MG/DL (ref 74–99)
HBA1C MFR BLD: 6.4 %
HCT VFR BLD AUTO: 42 % (ref 36–46)
HDLC SERPL-MCNC: 83.3 MG/DL
HGB BLD-MCNC: 13.7 G/DL (ref 12–16)
LDLC SERPL CALC-MCNC: 64 MG/DL
LDLC SERPL DIRECT ASSAY-MCNC: 65 MG/DL (ref 0–129)
MAGNESIUM SERPL-MCNC: 2.4 MG/DL (ref 1.6–2.4)
MCH RBC QN AUTO: 31.6 PG (ref 26–34)
MCHC RBC AUTO-ENTMCNC: 32.6 G/DL (ref 32–36)
MCV RBC AUTO: 97 FL (ref 80–100)
NON HDL CHOLESTEROL: 79 MG/DL (ref 0–149)
NRBC BLD-RTO: 0 /100 WBCS (ref 0–0)
PLATELET # BLD AUTO: 243 X10*3/UL (ref 150–450)
POTASSIUM SERPL-SCNC: 4.1 MMOL/L (ref 3.5–5.3)
PROT SERPL-MCNC: 7.6 G/DL (ref 6.4–8.2)
RBC # BLD AUTO: 4.33 X10*6/UL (ref 4–5.2)
SODIUM SERPL-SCNC: 140 MMOL/L (ref 136–145)
T3FREE SERPL-MCNC: 3.3 PG/ML (ref 2.3–4.2)
T4 FREE SERPL-MCNC: 0.73 NG/DL (ref 0.61–1.12)
TRIGL SERPL-MCNC: 73 MG/DL (ref 0–149)
TSH SERPL-ACNC: 0.24 MIU/L (ref 0.44–3.98)
VIT B12 SERPL-MCNC: 673 PG/ML (ref 211–911)
VLDL: 15 MG/DL (ref 0–40)
WBC # BLD AUTO: 5.2 X10*3/UL (ref 4.4–11.3)

## 2024-06-03 PROCEDURE — 80061 LIPID PANEL: CPT

## 2024-06-03 PROCEDURE — 36415 COLL VENOUS BLD VENIPUNCTURE: CPT

## 2024-06-03 PROCEDURE — 85027 COMPLETE CBC AUTOMATED: CPT

## 2024-06-03 PROCEDURE — 83735 ASSAY OF MAGNESIUM: CPT

## 2024-06-03 PROCEDURE — 80053 COMPREHEN METABOLIC PANEL: CPT

## 2024-06-03 PROCEDURE — 82607 VITAMIN B-12: CPT

## 2024-06-03 PROCEDURE — 83036 HEMOGLOBIN GLYCOSYLATED A1C: CPT

## 2024-06-03 PROCEDURE — 84481 FREE ASSAY (FT-3): CPT

## 2024-06-03 PROCEDURE — 84443 ASSAY THYROID STIM HORMONE: CPT

## 2024-06-03 PROCEDURE — 84439 ASSAY OF FREE THYROXINE: CPT

## 2024-06-03 PROCEDURE — 83721 ASSAY OF BLOOD LIPOPROTEIN: CPT

## 2024-06-04 ENCOUNTER — OFFICE VISIT (OUTPATIENT)
Dept: UROLOGY | Facility: CLINIC | Age: 79
End: 2024-06-04
Payer: MEDICARE

## 2024-06-04 ENCOUNTER — TELEPHONE (OUTPATIENT)
Dept: UROLOGY | Facility: CLINIC | Age: 79
End: 2024-06-04

## 2024-06-04 DIAGNOSIS — N32.81 OAB (OVERACTIVE BLADDER): Primary | ICD-10-CM

## 2024-06-04 PROCEDURE — 1157F ADVNC CARE PLAN IN RCRD: CPT | Performed by: STUDENT IN AN ORGANIZED HEALTH CARE EDUCATION/TRAINING PROGRAM

## 2024-06-04 PROCEDURE — 99214 OFFICE O/P EST MOD 30 MIN: CPT | Performed by: STUDENT IN AN ORGANIZED HEALTH CARE EDUCATION/TRAINING PROGRAM

## 2024-06-04 RX ORDER — MIRABEGRON 25 MG/1
25 TABLET, FILM COATED, EXTENDED RELEASE ORAL 2 TIMES DAILY
Qty: 180 TABLET | Refills: 3 | Status: SHIPPED | OUTPATIENT
Start: 2024-06-04 | End: 2025-06-04

## 2024-06-04 NOTE — PROGRESS NOTES
HISTORY OF PRESENT ILLNESS:  Robyn is doing  okay with Myrbetriq.  She is still getting up every 2 or 3 hours and having some urgency if she does not make it to the bathroom every 3.5 hours during the daytime.  She is scheduled to undergo some venous procedure and reluctant to try anything in addition to the medicine for her OAB right now.         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 (10/29/2014), Disease of thyroid gland (2018), Diverticulosis of colon (06/17/2013), Diverticulosis of intestine, part unspecified, without perforation or abscess without bleeding, Gastric polyp (06/23/2014), GERD (gastroesophageal reflux disease) (2008), Glaucoma suspect, both eyes (01/08/2015), HL (hearing loss) (2022), squamous cell carcinoma of skin (06/09/2015), Hypertension (2015), Internal hemorrhoid (03/21/2016), Low TSH level (03/10/2023), Lung nodule, solitary (03/10/2023), Lung nodule, solitary (03/10/2023), Mild intermittent asthma without complication (HHS-HCC) (02/24/2016), Other microscopic hematuria (03/01/2018), Other specified abnormal findings of blood chemistry (10/19/2021), Other specified abnormal findings of blood chemistry (10/19/2021), Pain in right knee, Personal history of other diseases of the digestive system, Personal history of other diseases of the musculoskeletal system and connective tissue (01/17/2020), Personal history of other diseases of the musculoskeletal system and connective tissue, Personal history of other diseases of the musculoskeletal system and connective tissue, Personal history of other diseases of the nervous system and sense organs, Personal history of other diseases of the nervous  "system and sense organs, Personal history of other diseases of the respiratory system, Personal history of other endocrine, nutritional and metabolic disease, Personal history of other endocrine, nutritional and metabolic disease, Personal history of other endocrine, nutritional and metabolic disease, Personal history of other malignant neoplasm of skin, Personal history of other mental and behavioral disorders, Presence of right artificial knee joint (09/21/2017), Scoliosis (2008), Visual impairment (2008), and Wrist sprain (03/10/2023).    Surgical History  She has a past surgical history that includes Knee arthroscopy w/ debridement (07/09/2016); Other surgical history (10/14/2019); and Joint replacement (2017-18).     Social History  She reports that she has never smoked. She has never used smokeless tobacco. She reports that she does not currently use alcohol. She reports that she does not use drugs.    Family History  Family History   Problem Relation Name Age of Onset    Diabetes Mother Jaci Bingham         Allergies  House dust, Adhesive tape-silicones, Cat dander, and Penicillins      A comprehensive 10+ review of systems was negative except for: see hpi                          PHYSICAL EXAMINATION:  BP Readings from Last 3 Encounters:   05/02/24 166/85   04/10/24 163/73   03/18/24 144/78      Wt Readings from Last 3 Encounters:   05/02/24 73 kg (161 lb)   04/10/24 73 kg (161 lb)   03/18/24 72.1 kg (159 lb)      BMI: Estimated body mass index is 25.99 kg/m² as calculated from the following:    Height as of 3/18/24: 1.676 m (5' 6\").    Weight as of 5/2/24: 73 kg (161 lb).  BSA: Estimated body surface area is 1.84 meters squared as calculated from the following:    Height as of 3/18/24: 1.676 m (5' 6\").    Weight as of 5/2/24: 73 kg (161 lb).  HEENT: Normocephalic, atraumatic, PER EOMI, nonicteric, trachea normal, thyroid normal, oropharynx normal.  CARDIAC: regular rate & rhythm, S1 & S2 normal.  No " heaves, thrills, gallops or murmurs.  LUNGS: Clear to auscultation, no spinal or CV tenderness.  EXTREMITIES: No evidence of cyanosis, clubbing or edema.               Assessment:  78 yo with microscopic hematuria and OAB      Nocturia/OAB  -improved with gemtesa, but too expensive, wants to stop and   Is noticing some improvement with Myrbetriq    Will refer to clinical pharmacy to get it covered    We discussed Vyvally device, may be interested    F/up in 3 months         Microhematuria:   Negative work-up, repeat UA 10/20/2024     Follow up in 6 months  Gino Haas MD

## 2024-06-06 PROBLEM — Z00.00 MEDICARE ANNUAL WELLNESS VISIT, SUBSEQUENT: Status: ACTIVE | Noted: 2024-06-06

## 2024-06-06 PROBLEM — E78.5 DYSLIPIDEMIA: Status: RESOLVED | Noted: 2024-01-22 | Resolved: 2024-06-06

## 2024-06-06 PROBLEM — R35.0 INCREASED FREQUENCY OF URINATION: Status: RESOLVED | Noted: 2023-06-23 | Resolved: 2024-06-06

## 2024-06-06 PROBLEM — R10.9 ABDOMINAL PAIN: Status: RESOLVED | Noted: 2024-01-19 | Resolved: 2024-06-06

## 2024-06-06 NOTE — PATIENT INSTRUCTIONS
I recommend new COVID booster at the pharmacy.    I would like you to follow up in 6 months  Please have all labs that were ordered done at least 1 week prior to your visit.

## 2024-06-06 NOTE — ASSESSMENT & PLAN NOTE
TSH mildly decreased but T4 within normal limits   asymptomatic   Endocrine following  Continue to monitor - Recheck 6 months

## 2024-06-06 NOTE — ASSESSMENT & PLAN NOTE
Medical Wellness exam done.   DEXA 3/23  colonoscopy 8/20  Mammogram  Shingrix series complete.  COVID booster 10/23  RSV 9/23  Prevnar 13 10/15  Pneumovax 23 6/19  Nonsmoker  On opioids per Pain [facility]  Depression Screening  Depression screening completed using the PHQ-2 questions, with results documented in the chart/encounter (~5min).  (See Rooming Screening section for documentation, and/or progress note for additional information)

## 2024-06-06 NOTE — PROGRESS NOTES
Subjective :  Chief Complaint: Robyn Madera is an 79 y.o. female here for an annual Medicare Wellness visit, annual physical, and follow up labs and chronic conditions.    Would like referral to Dr. Pepper to discuss IBS and possible SIBO.      Patient otherwise feels well. No other complaints or concerns.    I have reviewed and reconciled the medication list with the patient today.    Current Outpatient Medications:     amLODIPine (Norvasc) 10 mg tablet, Take 1 tablet (10 mg) by mouth once daily., Disp: 90 tablet, Rfl: 3    aspirin 81 mg EC tablet, Take 1 tablet (81 mg) by mouth once daily., Disp: , Rfl:     atorvastatin (Lipitor) 40 mg tablet, Take 1 tablet (40 mg) by mouth once daily at bedtime., Disp: 90 tablet, Rfl: 3    calcium carbonate-vitamin D3 500 mg-3.125 mcg (125 unit) tablet tablet, Take by mouth., Disp: , Rfl:     cyanocobalamin (Vitamin B-12) 1,000 mcg tablet, Take 1 tablet (1,000 mcg) by mouth once daily., Disp: , Rfl:     diazePAM (Valium) 5 mg tablet, Take 1 tablet (5 mg) by mouth 1 time for 1 dose., Disp: 2 tablet, Rfl: 0    docusate sodium (Colace) 100 mg capsule, Take by mouth twice a day., Disp: , Rfl:     DULoxetine (Cymbalta) 30 mg DR capsule, Take 1 capsule (30 mg) by mouth once daily. Do not crush or chew.  Patient will take duloxetine 30 mg and 60 mg capsule daily for a total dose of 90 mg daily.   Please hold until fill date Do not start before March 29, 2024., Disp: 90 capsule, Rfl: 0    DULoxetine (Cymbalta) 60 mg DR capsule, Take 1 capsule (60 mg) by mouth once daily. Do not crush or chew.  Will take duloxetine 60 mg and 30 mg capsule daily for a total dose of 90 mg daily.  Please hold until fill date Do not start before March 29, 2024., Disp: 90 capsule, Rfl: 0    esomeprazole (NexIUM) 20 mg DR capsule, Take 1 capsule (20 mg) by mouth once daily., Disp: , Rfl:     gabapentin (Neurontin) 100 mg capsule, Take 3 capsules (300 mg) by mouth 3 times a day., Disp: 810 capsule, Rfl:  3    mirabegron (Myrbetriq) 25 mg tablet extended release 24 hr 24 hr tablet, Take 1 tablet (25 mg) by mouth 2 times a day., Disp: 180 tablet, Rfl: 3    mirabegron (Myrbetriq) 50 mg tablet extended release 24 hr 24 hr tablet, Take 1 tablet (50 mg) by mouth once daily., Disp: 90 tablet, Rfl: 3    oxyCODONE (Roxicodone) 5 mg immediate release tablet, Take 1 tablet (5 mg) by mouth 2 times a day as needed for severe pain (7 - 10). Do not fill before May 20, 2024., Disp: 60 tablet, Rfl: 0    polyethylene glycol (Glycolax) 17 gram/dose powder, Take by mouth every other day., Disp: , Rfl:     busPIRone (Buspar) 15 mg tablet, Take 1 tablet (15 mg) by mouth 2 times a day., Disp: 180 tablet, Rfl: 1    The patient's relevant past medical, surgical, family and social history was reviewed in [facility].  All pertinent lab work and results for this visit were reviewed with patient.    Lab on 06/03/2024   Component Date Value Ref Range Status    Magnesium 06/03/2024 2.40  1.60 - 2.40 mg/dL Final    Thyroxine, Free 06/03/2024 0.73  0.61 - 1.12 ng/dL Final    Triiodothyronine, Free 06/03/2024 3.3  2.3 - 4.2 pg/mL Final    Hemoglobin A1C 06/03/2024 6.4 (H)  see below % Final    Estimated Average Glucose 06/03/2024 137  Not Established mg/dL Final    Vitamin B12 06/03/2024 673  211 - 911 pg/mL Final    Thyroid Stimulating Hormone 06/03/2024 0.24 (L)  0.44 - 3.98 mIU/L Final    WBC 06/03/2024 5.2  4.4 - 11.3 x10*3/uL Final    nRBC 06/03/2024 0.0  0.0 - 0.0 /100 WBCs Final    RBC 06/03/2024 4.33  4.00 - 5.20 x10*6/uL Final    Hemoglobin 06/03/2024 13.7  12.0 - 16.0 g/dL Final    Hematocrit 06/03/2024 42.0  36.0 - 46.0 % Final    MCV 06/03/2024 97  80 - 100 fL Final    MCH 06/03/2024 31.6  26.0 - 34.0 pg Final    MCHC 06/03/2024 32.6  32.0 - 36.0 g/dL Final    RDW 06/03/2024 14.0  11.5 - 14.5 % Final    Platelets 06/03/2024 243  150 - 450 x10*3/uL Final    Cholesterol 06/03/2024 162  0 - 199 mg/dL Final          Age      Desirable   Borderline  High   High     0-19 Y     0 - 169       170 - 199     >/= 200    20-24 Y     0 - 189       190 - 224     >/= 225         >24 Y     0 - 199       200 - 239     >/= 240   **All ranges are based on fasting samples. Specific   therapeutic targets will vary based on patient-specific   cardiac risk.    Pediatric guidelines reference:Pediatrics 2011, 128(S5).Adult guidelines reference: NCEP ATPIII Guidelines,MARIA LUISA 2001, 258:5386-33    Venipuncture immediately after or during the administration of Metamizole may lead to falsely low results. Testing should be performed immediately prior to Metamizole dosing.    HDL-Cholesterol 06/03/2024 83.3  mg/dL Final      Age       Very Low   Low     Normal    High    0-19 Y    < 35      < 40     40-45     ----  20-24 Y    ----     < 40      >45      ----        >24 Y      ----     < 40     40-60      >60      Cholesterol/HDL Ratio 06/03/2024 1.9   Final      Ref Values  Desirable  < 3.4  High Risk  > 5.0    LDL Calculated 06/03/2024 64  <=99 mg/dL Final                                Near   Borderline      AGE      Desirable  Optimal    High     High     Very High     0-19 Y     0 - 109     ---    110-129   >/= 130     ----    20-24 Y     0 - 119     ---    120-159   >/= 160     ----      >24 Y     0 -  99   100-129  130-159   160-189     >/=190      VLDL 06/03/2024 15  0 - 40 mg/dL Final    Triglycerides 06/03/2024 73  0 - 149 mg/dL Final       Age         Desirable   Borderline High   High     Very High   0 D-90 D    19 - 174         ----         ----        ----  91 D- 9 Y     0 -  74        75 -  99     >/= 100      ----    10-19 Y     0 -  89        90 - 129     >/= 130      ----    20-24 Y     0 - 114       115 - 149     >/= 150      ----         >24 Y     0 - 149       150 - 199    200- 499    >/= 500    Venipuncture immediately after or during the administration of Metamizole may lead to falsely low results. Testing should be performed immediately prior to Metamizole dosing.     Non HDL Cholesterol 06/03/2024 79  0 - 149 mg/dL Final          Age       Desirable   Borderline High   High     Very High     0-19 Y     0 - 119       120 - 144     >/= 145    >/= 160    20-24 Y     0 - 149       150 - 189     >/= 190      ----         >24 Y    30 mg/dL above LDL Cholesterol goal      LDL, Direct 06/03/2024 65  0 - 129 mg/dL Final    Glucose 06/03/2024 135 (H)  74 - 99 mg/dL Final    Sodium 06/03/2024 140  136 - 145 mmol/L Final    Potassium 06/03/2024 4.1  3.5 - 5.3 mmol/L Final    Chloride 06/03/2024 104  98 - 107 mmol/L Final    Bicarbonate 06/03/2024 30  21 - 32 mmol/L Final    Anion Gap 06/03/2024 10  10 - 20 mmol/L Final    Urea Nitrogen 06/03/2024 14  6 - 23 mg/dL Final    Creatinine 06/03/2024 0.68  0.50 - 1.05 mg/dL Final    eGFR 06/03/2024 90  >60 mL/min/1.73m*2 Final    Calculations of estimated GFR are performed using the 2021 CKD-EPI Study Refit equation without the race variable for the IDMS-Traceable creatinine methods.  https://jasn.asnjournals.org/content/early/2021/09/22/ASN.3186668451    Calcium 06/03/2024 9.0  8.6 - 10.3 mg/dL Final    Albumin 06/03/2024 4.3  3.4 - 5.0 g/dL Final    Alkaline Phosphatase 06/03/2024 83  33 - 136 U/L Final    Total Protein 06/03/2024 7.6  6.4 - 8.2 g/dL Final    AST 06/03/2024 20  9 - 39 U/L Final    Bilirubin, Total 06/03/2024 0.7  0.0 - 1.2 mg/dL Final    ALT 06/03/2024 17  7 - 45 U/L Final    Patients treated with Sulfasalazine may generate falsely decreased results for ALT.         Review of Systems   A complete review of systems was performed and all systems were normal except what is noted in the HPI.      List of current healthcare providers:  Patient Care Team:  Reanna Martinez MD as PCP - General  Reanna Martinez MD as PCP - tna Medicare Advantage PCP  Roselia Rivera MD as Consulting Physician (Endocrinology)  Carl William MD as Consulting Physician (Cardiology)  Cooper Merchant MD as Anesthesiologist (Pain Medicine)  Vibha ARMSTRONG  "Deley, APRN-CNP as Nurse Practitioner (Urology)        Over the past 2 weeks, how often have you been bothered by any of the following problems?  Little interest or pleasure in doing things: Not at all  Feeling down, depressed, or hopeless: Not at all  Patient Health Questionnaire-2 Score: 0             Advance Care Planning:    Living Will: Yes  POA: Yes    Objective :  /80   Pulse 87   Temp 35.8 °C (96.5 °F)   Ht 1.676 m (5' 6\")   Wt 74.5 kg (164 lb 3.2 oz)   SpO2 96%   BMI 26.50 kg/m²    No results found.  Physical Exam  Constitutional:       Appearance: Normal appearance.   HENT:      Head: Normocephalic and atraumatic.   Neck:      Vascular: No carotid bruit.   Cardiovascular:      Rate and Rhythm: Normal rate and regular rhythm.      Heart sounds: Normal heart sounds.   Pulmonary:      Effort: Pulmonary effort is normal.      Breath sounds: Normal breath sounds. No wheezing, rhonchi or rales.   Abdominal:      General: Abdomen is flat. Bowel sounds are normal.      Palpations: Abdomen is soft.      Tenderness: There is no abdominal tenderness. There is no guarding.   Musculoskeletal:         General: Normal range of motion.      Right lower leg: No edema.      Left lower leg: No edema.   Skin:     General: Skin is dry.   Neurological:      General: No focal deficit present.      Mental Status: She is alert and oriented to person, place, and time.   Psychiatric:         Mood and Affect: Mood normal.         Behavior: Behavior normal.         Thought Content: Thought content normal.         Assessment/Plan :  Problem List Items Addressed This Visit       Benign essential hypertension     Well controlled. Continue current medicine and recheck in 6 months.           Relevant Orders    Comprehensive Metabolic Panel    Chronic constipation    Relevant Orders    Referral to Gastroenterology    Mixed hyperlipidemia     Well controlled. Continue current medicine and recheck in 6 months.           Relevant " Orders    Cholesterol, LDL Direct    Lipid Panel    Impaired fasting glucose     Stable  continue work on diet  Recheck 6 months          Relevant Orders    Comprehensive Metabolic Panel    Hemoglobin A1C    Subclinical hyperthyroidism     TSH mildly decreased but T4 within normal limits   asymptomatic   Endocrine following  Continue to monitor - Recheck 6 months          Relevant Orders    TSH with reflex to Free T4 if abnormal    Generalized anxiety disorder     Well controlled. Continue current medicine and recheck in 6 months.  Call for worsening depression or anxiety, suicidal or homicidal ideation.          Relevant Medications    busPIRone (Buspar) 15 mg tablet    diazePAM (Valium) 5 mg tablet    B12 deficiency     Well controlled. Continue current medicine and recheck in 6 months.           Relevant Orders    Vitamin B12    Medicare annual wellness visit, subsequent - Primary     Medical Wellness exam done.   DEXA 3/23  colonoscopy 8/20  Mammogram  Shingrix series complete.  COVID booster 10/23  RSV 9/23  Prevnar 13 10/15  Pneumovax 23 6/19  Nonsmoker  On opioids per Pain Mgt  Depression Screening  Depression screening completed using the PHQ-2 questions, with results documented in the chart/encounter (~5min).  (See Rooming Screening section for documentation, and/or progress note for additional information)           Venous reflux     Wearing compression hose  Has follow up Dr. Tolbert later this month         Pancreatic cyst ([facility]-HCC)     follow up MRI reordered         Relevant Orders    MRCP pancreas w and wo IV contrast     Other Visit Diagnoses       Annual physical exam        Yearly physical done    Acute anxiety        Relevant Medications    busPIRone (Buspar) 15 mg tablet    diazePAM (Valium) 5 mg tablet    Breast screening        Relevant Orders    BI mammo bilateral screening tomosynthesis    Encounter for screening mammogram for malignant neoplasm of breast        Relevant Orders    BI mammo  bilateral screening tomosynthesis               The following health maintenance schedule was reviewed with the patient and provided in printed form in the after visit summary:  Health Maintenance   Topic Date Due    Hepatitis A Vaccines (1 of 2 - Risk 2-dose series) Never done    Hepatitis B Vaccines (1 of 3 - Risk 3-dose series) Never done    DTaP/Tdap/Td Vaccines (3 - Td or Tdap) 02/15/2024    COVID-19 Vaccine (8 - 2023-24 season) 02/22/2024    TSH Level  06/03/2025    Diabetes: Hemoglobin A1C  06/03/2025    Diabetes Screening  06/03/2025    Medicare Annual Wellness Visit (AWV)  06/11/2025    Lipid Panel  06/03/2029    RSV Pregnant patients and/or  patients aged 60+ years  Completed    Influenza Vaccine  Completed    Pneumococcal Vaccine: 65+ Years  Completed    Hepatitis C Screening  Completed    Bone Density Scan  Completed    HIB Vaccines  Aged Out    IPV Vaccines  Aged Out    Meningococcal Vaccine  Aged Out    Rotavirus Vaccines  Aged Out    HPV Vaccines  Aged Out    Zoster Vaccines  Discontinued    Irritable Bowel Syndrome  Discontinued           Patient understands and agrees with treatment plan.          Reanna Martinez MD

## 2024-06-06 NOTE — ASSESSMENT & PLAN NOTE
Well controlled. Continue current medicine and recheck in 6 months.  Call for worsening depression or anxiety, suicidal or homicidal ideation.

## 2024-06-10 ENCOUNTER — OFFICE VISIT (OUTPATIENT)
Dept: PRIMARY CARE | Facility: CLINIC | Age: 79
End: 2024-06-10
Payer: MEDICARE

## 2024-06-10 ENCOUNTER — TELEPHONE (OUTPATIENT)
Dept: PRIMARY CARE | Facility: CLINIC | Age: 79
End: 2024-06-10

## 2024-06-10 VITALS
TEMPERATURE: 96.5 F | SYSTOLIC BLOOD PRESSURE: 134 MMHG | OXYGEN SATURATION: 96 % | BODY MASS INDEX: 26.39 KG/M2 | HEIGHT: 66 IN | WEIGHT: 164.2 LBS | HEART RATE: 87 BPM | DIASTOLIC BLOOD PRESSURE: 80 MMHG

## 2024-06-10 DIAGNOSIS — F41.1 GENERALIZED ANXIETY DISORDER: ICD-10-CM

## 2024-06-10 DIAGNOSIS — I10 BENIGN ESSENTIAL HYPERTENSION: ICD-10-CM

## 2024-06-10 DIAGNOSIS — E78.2 MIXED HYPERLIPIDEMIA: ICD-10-CM

## 2024-06-10 DIAGNOSIS — K86.2 PANCREATIC CYST (HHS-HCC): ICD-10-CM

## 2024-06-10 DIAGNOSIS — R73.01 IMPAIRED FASTING GLUCOSE: ICD-10-CM

## 2024-06-10 DIAGNOSIS — K59.09 CHRONIC CONSTIPATION: ICD-10-CM

## 2024-06-10 DIAGNOSIS — Z00.00 ANNUAL PHYSICAL EXAM: ICD-10-CM

## 2024-06-10 DIAGNOSIS — E53.8 B12 DEFICIENCY: ICD-10-CM

## 2024-06-10 DIAGNOSIS — E05.90 SUBCLINICAL HYPERTHYROIDISM: ICD-10-CM

## 2024-06-10 DIAGNOSIS — Z12.39 BREAST SCREENING: ICD-10-CM

## 2024-06-10 DIAGNOSIS — Z12.31 ENCOUNTER FOR SCREENING MAMMOGRAM FOR MALIGNANT NEOPLASM OF BREAST: ICD-10-CM

## 2024-06-10 DIAGNOSIS — I87.2 VENOUS REFLUX: ICD-10-CM

## 2024-06-10 DIAGNOSIS — F41.9 ACUTE ANXIETY: ICD-10-CM

## 2024-06-10 DIAGNOSIS — Z00.00 MEDICARE ANNUAL WELLNESS VISIT, SUBSEQUENT: Primary | ICD-10-CM

## 2024-06-10 PROCEDURE — G0444 DEPRESSION SCREEN ANNUAL: HCPCS | Performed by: FAMILY MEDICINE

## 2024-06-10 PROCEDURE — 1157F ADVNC CARE PLAN IN RCRD: CPT | Performed by: FAMILY MEDICINE

## 2024-06-10 PROCEDURE — 3075F SYST BP GE 130 - 139MM HG: CPT | Performed by: FAMILY MEDICINE

## 2024-06-10 PROCEDURE — 1158F ADVNC CARE PLAN TLK DOCD: CPT | Performed by: FAMILY MEDICINE

## 2024-06-10 PROCEDURE — 1170F FXNL STATUS ASSESSED: CPT | Performed by: FAMILY MEDICINE

## 2024-06-10 PROCEDURE — 99214 OFFICE O/P EST MOD 30 MIN: CPT | Performed by: FAMILY MEDICINE

## 2024-06-10 PROCEDURE — 99397 PER PM REEVAL EST PAT 65+ YR: CPT | Performed by: FAMILY MEDICINE

## 2024-06-10 PROCEDURE — 1036F TOBACCO NON-USER: CPT | Performed by: FAMILY MEDICINE

## 2024-06-10 PROCEDURE — 3079F DIAST BP 80-89 MM HG: CPT | Performed by: FAMILY MEDICINE

## 2024-06-10 PROCEDURE — 1159F MED LIST DOCD IN RCRD: CPT | Performed by: FAMILY MEDICINE

## 2024-06-10 PROCEDURE — 1160F RVW MEDS BY RX/DR IN RCRD: CPT | Performed by: FAMILY MEDICINE

## 2024-06-10 PROCEDURE — 1123F ACP DISCUSS/DSCN MKR DOCD: CPT | Performed by: FAMILY MEDICINE

## 2024-06-10 PROCEDURE — G0439 PPPS, SUBSEQ VISIT: HCPCS | Performed by: FAMILY MEDICINE

## 2024-06-10 RX ORDER — DIAZEPAM 5 MG/1
5 TABLET ORAL ONCE
Qty: 2 TABLET | Refills: 0 | Status: SHIPPED | OUTPATIENT
Start: 2024-06-10 | End: 2024-06-10

## 2024-06-10 RX ORDER — BUSPIRONE HYDROCHLORIDE 15 MG/1
15 TABLET ORAL 2 TIMES DAILY
Qty: 180 TABLET | Refills: 1 | Status: SHIPPED | OUTPATIENT
Start: 2024-06-10 | End: 2024-12-07

## 2024-06-10 ASSESSMENT — ACTIVITIES OF DAILY LIVING (ADL)
TAKING_MEDICATION: INDEPENDENT
MANAGING_FINANCES: INDEPENDENT
DOING_HOUSEWORK: INDEPENDENT
DRESSING: INDEPENDENT
BATHING: INDEPENDENT
GROCERY_SHOPPING: INDEPENDENT

## 2024-06-10 ASSESSMENT — PATIENT HEALTH QUESTIONNAIRE - PHQ9
2. FEELING DOWN, DEPRESSED OR HOPELESS: NOT AT ALL
1. LITTLE INTEREST OR PLEASURE IN DOING THINGS: NOT AT ALL
SUM OF ALL RESPONSES TO PHQ9 QUESTIONS 1 AND 2: 0

## 2024-06-10 ASSESSMENT — ENCOUNTER SYMPTOMS
OCCASIONAL FEELINGS OF UNSTEADINESS: 1
LOSS OF SENSATION IN FEET: 1
DEPRESSION: 0

## 2024-06-10 NOTE — TELEPHONE ENCOUNTER
Patient called in to see if she is supposed to get her labs done in November or would you prefer her to get them done the first week of December closer to the appointment? She stated that if somebody calls back with the answer if they can leave a voicemail if she does not answer

## 2024-06-13 ENCOUNTER — OFFICE VISIT (OUTPATIENT)
Dept: PAIN MEDICINE | Facility: HOSPITAL | Age: 79
End: 2024-06-13
Payer: MEDICARE

## 2024-06-13 VITALS
BODY MASS INDEX: 26.26 KG/M2 | HEART RATE: 88 BPM | HEIGHT: 66 IN | SYSTOLIC BLOOD PRESSURE: 150 MMHG | OXYGEN SATURATION: 96 % | WEIGHT: 163.4 LBS | RESPIRATION RATE: 16 BRPM | DIASTOLIC BLOOD PRESSURE: 71 MMHG

## 2024-06-13 DIAGNOSIS — M47.27 OSTEOARTHRITIS OF SPINE WITH RADICULOPATHY, LUMBOSACRAL REGION: Primary | ICD-10-CM

## 2024-06-13 DIAGNOSIS — M48.061 SPINAL STENOSIS OF LUMBAR REGION, UNSPECIFIED WHETHER NEUROGENIC CLAUDICATION PRESENT: ICD-10-CM

## 2024-06-13 DIAGNOSIS — M47.816 LUMBAR SPONDYLOSIS: ICD-10-CM

## 2024-06-13 DIAGNOSIS — M54.16 LUMBAR RADICULOPATHY: ICD-10-CM

## 2024-06-13 DIAGNOSIS — M54.16 CHRONIC LUMBAR RADICULOPATHY: ICD-10-CM

## 2024-06-13 PROCEDURE — 1157F ADVNC CARE PLAN IN RCRD: CPT | Performed by: CLINICAL NURSE SPECIALIST

## 2024-06-13 PROCEDURE — 1159F MED LIST DOCD IN RCRD: CPT | Performed by: CLINICAL NURSE SPECIALIST

## 2024-06-13 PROCEDURE — 3077F SYST BP >= 140 MM HG: CPT | Performed by: CLINICAL NURSE SPECIALIST

## 2024-06-13 PROCEDURE — 1036F TOBACCO NON-USER: CPT | Performed by: CLINICAL NURSE SPECIALIST

## 2024-06-13 PROCEDURE — 1123F ACP DISCUSS/DSCN MKR DOCD: CPT | Performed by: CLINICAL NURSE SPECIALIST

## 2024-06-13 PROCEDURE — 1160F RVW MEDS BY RX/DR IN RCRD: CPT | Performed by: CLINICAL NURSE SPECIALIST

## 2024-06-13 PROCEDURE — 99214 OFFICE O/P EST MOD 30 MIN: CPT | Performed by: CLINICAL NURSE SPECIALIST

## 2024-06-13 PROCEDURE — 3078F DIAST BP <80 MM HG: CPT | Performed by: CLINICAL NURSE SPECIALIST

## 2024-06-13 RX ORDER — OXYCODONE HYDROCHLORIDE 5 MG/1
5 TABLET ORAL 2 TIMES DAILY PRN
Qty: 60 TABLET | Refills: 0 | Status: SHIPPED | OUTPATIENT
Start: 2024-06-19 | End: 2024-07-19

## 2024-06-13 ASSESSMENT — ENCOUNTER SYMPTOMS
OCCASIONAL FEELINGS OF UNSTEADINESS: 1
DEPRESSION: 0
LOSS OF SENSATION IN FEET: 1

## 2024-06-13 ASSESSMENT — PATIENT HEALTH QUESTIONNAIRE - PHQ9
SUM OF ALL RESPONSES TO PHQ9 QUESTIONS 1 AND 2: 0
2. FEELING DOWN, DEPRESSED OR HOPELESS: NOT AT ALL
1. LITTLE INTEREST OR PLEASURE IN DOING THINGS: NOT AT ALL

## 2024-06-13 NOTE — PROGRESS NOTES
"Subjective   Patient ID: Robyn Madera is a 79 y.o. female who presents for lower back pain and neuropathy    HPI  Patient returns to the office for interval re-evaluation of \"lower back pain intermittent sciatic pain, periods of neuropathy to bilateral legs left worse than right. Neuropathy occasionally goes all the way to left foot, described as numbness.\"  Denies any increasing weakness or changes in bowel/bladder function.  Chronic incontinence which has improved with Myrbetriq.  The pain is \"constant\" but varies in severity and continues to be worsened with activities such as standing, bending and walking.  Patient notes an increase in her pain late afternoon as well as in the middle of the night when she gets up to go to the bathroom.  Currently in physical therapy targeting balance/strengthening after recent fall tripping over her cane.  Patient denies any additional falls.  Managing her pain with oxycodone 5 mg twice daily, gabapentin 300 mg 3 times daily, duloxetine 90 mg/day.  Occasionally will supplement with extra strength Tylenol.  Patient is scheduled for an MRCP and has a dose of diazepam ordered per PCP due to claustrophobia.  Patient states that she has recently been diagnosed with venous reflux and is seeing vascular for it.      Pain Score: 5/10     Treatment: Oxycodone 5mg BID  Medication count: 19  Last doses taken: 06/13/2024  Fill date: 05/17/2024     Efficacy: 75-80% relief for 6 hours depending on the day     Narcan: EXP. 12/2025     Other medications/neuromodulators: Gabapentin 300MG TID no refill needed, Cymbalta 30mg/60mg no refill needed     Injections and/or Procedures: none recent      Other:  does home exercises    OARRS:  No data recorded  I have personally reviewed the OARRS report for Robyn Madera. I have considered the risks of abuse, dependence, addiction and diversion    Is the patient prescribed a combination of a benzodiazepine and opioid?  No 1 dose of Valium prior " to her [facility].    Last Urine Drug Screen / ordered today: No 03/18/2024  Recent Results (from the past 8760 hour(s))   Screen Opiate/Opioid/Benzo Prescription Compliance    Collection Time: 03/18/24 11:20 AM   Result Value Ref Range    Creatinine, Urine Random 40.1 20.0 - 320.0 mg/dL    Amphetamine Screen, Urine Presumptive Negative Presumptive Negative    Barbiturate Screen, Urine Presumptive Negative Presumptive Negative    Cannabinoid Screen, Urine Presumptive Negative Presumptive Negative    Cocaine Metabolite Screen, Urine Presumptive Negative Presumptive Negative    PCP Screen, Urine Presumptive Negative Presumptive Negative   Tapentadol Confirmation, Urine    Collection Time: 03/18/24 11:19 AM   Result Value Ref Range    Tapentadol <25 <25 ng/mL    N-Desmethyltapentadol <25 <25 ng/mL   Buprenorphine Confirm,Urine    Collection Time: 03/18/24 11:19 AM   Result Value Ref Range    BUPRENORPHINE GLUC, URINE <5 ng/mL    BUPRENORPHINE ,URINE <2 ng/mL    NALOXONE, URINE <100 ng/mL    NORBUPRENORPHINE GLUC,URINE <5 ng/mL    NORBUPRENORPHINE, URINE <2 ng/mL     Results are as expected.     Controlled Substance Agreement:  Date of the Last Agreement:  09/21/2023  Reviewed Controlled Substance Agreement including but not limited to the benefits, risks, and alternatives to treatment with a Controlled Substance medication(s).    Monitoring and compliance:    ORT: 09/21/2023    PDUQ: 03/18/2024    Office Agreement: 06/13/2024      Review of Systems    ROS:   General: No fevers, chills, weight loss  Skin: Negative for lesions  Eyes: No acute vision changes  Ears: No vertigo  Nose, mouth, throat: No difficulty swallowing or speaking  Respiratory: No cough, shortness of breath, cyanosis  Cardiovascular: Negative for chest pain syncope or palpitation  Gastrointestinal: No constipation, nausea, vomiting  Neurological: Negative for headache, positive for: Paresthesia  Psychological: Negative for severe or debilitating anxiety,  depression. Negative memory loss  Musculoskeletal: Positive for arthralgia, myalgia and pain  Endocrine: Negative for weight gain, appetite changes, excessive sweating  Allergy/immune: Negative    All 13 systems were reviewed and are within normal levels except as noted or in the history of present illness.  Positive or pertinent negative responses are noted or were in the history of present illness. As noted, the patient denies significant or impairing weakness in the bilateral upper and lower extremities, medication induced constipation, and bowel or bladder incontinence.     Current Outpatient Medications:     amLODIPine (Norvasc) 10 mg tablet, Take 1 tablet (10 mg) by mouth once daily., Disp: 90 tablet, Rfl: 3    aspirin 81 mg EC tablet, Take 1 tablet (81 mg) by mouth once daily., Disp: , Rfl:     atorvastatin (Lipitor) 40 mg tablet, Take 1 tablet (40 mg) by mouth once daily at bedtime., Disp: 90 tablet, Rfl: 3    busPIRone (Buspar) 15 mg tablet, Take 1 tablet (15 mg) by mouth 2 times a day., Disp: 180 tablet, Rfl: 1    calcium carbonate-vitamin D3 500 mg-3.125 mcg (125 unit) tablet tablet, Take by mouth., Disp: , Rfl:     cyanocobalamin (Vitamin B-12) 1,000 mcg tablet, Take 1 tablet (1,000 mcg) by mouth once daily., Disp: , Rfl:     diazePAM (Valium) 5 mg tablet, Take 1 tablet (5 mg) by mouth 1 time for 1 dose., Disp: 2 tablet, Rfl: 0    docusate sodium (Colace) 100 mg capsule, Take by mouth twice a day., Disp: , Rfl:     DULoxetine (Cymbalta) 30 mg DR capsule, Take 1 capsule (30 mg) by mouth once daily. Do not crush or chew.  Patient will take duloxetine 30 mg and 60 mg capsule daily for a total dose of 90 mg daily.   Please hold until fill date Do not start before March 29, 2024., Disp: 90 capsule, Rfl: 0    DULoxetine (Cymbalta) 60 mg DR capsule, Take 1 capsule (60 mg) by mouth once daily. Do not crush or chew.  Will take duloxetine 60 mg and 30 mg capsule daily for a total dose of 90 mg daily.  Please hold  until fill date Do not start before March 29, 2024., Disp: 90 capsule, Rfl: 0    esomeprazole (NexIUM) 20 mg DR capsule, Take 1 capsule (20 mg) by mouth once daily., Disp: , Rfl:     gabapentin (Neurontin) 100 mg capsule, Take 3 capsules (300 mg) by mouth 3 times a day., Disp: 810 capsule, Rfl: 3    mirabegron (Myrbetriq) 25 mg tablet extended release 24 hr 24 hr tablet, Take 1 tablet (25 mg) by mouth 2 times a day., Disp: 180 tablet, Rfl: 3    mirabegron (Myrbetriq) 50 mg tablet extended release 24 hr 24 hr tablet, Take 1 tablet (50 mg) by mouth once daily., Disp: 90 tablet, Rfl: 3    oxyCODONE (Roxicodone) 5 mg immediate release tablet, Take 1 tablet (5 mg) by mouth 2 times a day as needed for severe pain (7 - 10). Do not fill before May 20, 2024., Disp: 60 tablet, Rfl: 0    polyethylene glycol (Glycolax) 17 gram/dose powder, Take by mouth every other day., Disp: , Rfl:      Past Medical History:   Diagnosis Date    Abnormal findings on diagnostic imaging of heart and coronary circulation 11/07/2019    Abnormal Heart Score CT    Abnormal levels of other serum enzymes 10/24/2019    Abnormal cardiac enzyme level    Allergic rhinitis due to animal hair or dander 10/28/2013    Anxiety 2023    Arthritis 2016    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/2014    Disease of thyroid gland 2018    Diverticulosis of colon 06/17/2013    Diverticulosis of intestine, part unspecified, without perforation or abscess without bleeding     Diverticulosis    Gastric polyp 06/23/2014    GERD (gastroesophageal reflux disease) 2008    Glaucoma suspect, both eyes 01/08/2015    HL (hearing loss) 2022    Hx of squamous cell carcinoma of skin 06/09/2015    Hypertension 2015    Internal hemorrhoid 03/21/2016    Low TSH level 03/10/2023    Lung nodule, solitary 03/10/2023    Lung nodule, solitary 03/10/2023    Stable CT 6/23  No follow up needed    Mild intermittent asthma without complication ([facility]) 02/24/2016    Other  microscopic hematuria 03/01/2018    Microscopic hematuria    Other specified abnormal findings of blood chemistry 10/19/2021    Elevated LFTs    Other specified abnormal findings of blood chemistry 10/19/2021    Elevated LFTs    Pain in right knee     Right knee pain    Personal history of other diseases of the digestive system     History of hiatal hernia    Personal history of other diseases of the musculoskeletal system and connective tissue 01/17/2020    History of osteopenia    Personal history of other diseases of the musculoskeletal system and connective tissue     History of herniated intervertebral disc    Personal history of other diseases of the musculoskeletal system and connective tissue     History of chronic back pain    Personal history of other diseases of the nervous system and sense organs     History of cataract    Personal history of other diseases of the nervous system and sense organs     History of glaucoma    Personal history of other diseases of the respiratory system     History of allergic rhinitis    Personal history of other endocrine, nutritional and metabolic disease     History of hyperlipidemia    Personal history of other endocrine, nutritional and metabolic disease     History of Graves' disease    Personal history of other endocrine, nutritional and metabolic disease     History of hypoglycemia    Personal history of other malignant neoplasm of skin     History of malignant neoplasm of skin    Personal history of other mental and behavioral disorders     History of anxiety    Presence of right artificial knee joint 09/21/2017    Scoliosis 2008    Visual impairment 2008    Wrist sprain 03/10/2023        Past Surgical History:   Procedure Laterality Date    JOINT REPLACEMENT  2017-18    KNEE ARTHROSCOPY W/ DEBRIDEMENT  07/09/2016    Arthroscopy Knee    OTHER SURGICAL HISTORY  10/14/2019    Excision of squamous cell carcinoma        Family History   Problem Relation Name Age of  Onset    Diabetes Mother Jaci Bingham         Allergies   Allergen Reactions    House Dust Hives    Adhesive Tape-Silicones Other     ADHESIVES CAUSES BLISTERS    Cat Dander Unknown    Penicillins Hives        Objective     Visit Vitals  OB Status Postmenopausal   Smoking Status Never        Physical Exam    PE:  General: Well-developed, well-nourished, no acute distress. The patient demonstrates no pain behavior, symptom magnification or overt drug-seeking behavior.  Eye: Pupils appropriate for room lighting  Neck/thyroid: No obvious goiter or enlargement of neck noted  Respiratory exam: Normal respiratory effort, unlabored respiration. No accessory muscle use noted  Cardiac exam: Bilateral radial pulses intact  Abdominal: Nondistended  Spine, lumbar: The patient is able to rise from a seated to standing position with slight hesitancy.  Gait is slow and deliberate.  Ambulates with assistance of a cane.  Tenderness to paraspinous musculature is noted lower lumbar spine.  Flexion intact with extension limited and increasing pain.  Neurologic exam: Muscle strength is antigravity in all 4 extremities.  Psychiatric exam: Judgment and insight normal, affect normal, speech is fluent, affect appropriate, demonstrating no signs of hypersomnolence, sedation, or confusion          Assessment/Plan   Problem List Items Addressed This Visit             ICD-10-CM    Spinal stenosis of lumbar region M48.061     79-year-old female with history of spinal stenosis as well as polyarticular pain presents for follow-up.  Presents at today's office visit with chronic low back pain which may intermittently radiate to her lower extremities left greater than right accompanied by neuropathy.  Patient denies any increase in lower extremity weakness or changes in bowel/bladder function.  She struggles with chronic urinary incontinence and is being managed by her PCP.  She has noted some improvement in incontinence with the addition of  Myrbetriq.  She notices an increase in her pain when she is more active with most of her pain noted late afternoon as well as the middle of the night.  Overall she continues to do well on oxycodone 5 mg twice daily, gabapentin 300 mg 3 times daily, duloxetine 90 mg/day.  We discussed supplementing with extra strength Tylenol in the middle of the night when she gets up to go to the bathroom and notices an increase in her pain.  Patient states that her PCP would like her to limit total Tylenol use to 1 extra strength Tylenol per day.  Advised the patient to contact her PCP and discuss taking an additional Tylenol late afternoon when her pain is more intense.  Patient states that she would not need this daily, however, only when more active.  Reviewed most recent CMP with adequate liver function.  Also advised patient that she may supplement with lidocaine patches late afternoon when her low back pain is most intense.  She is continuing physical therapy targeting balance.  OARRS reviewed no issues.  Plan discussed with patient at today's visit.      -Continue a low-dose of oxycodone 5 mg up to 2 times per day as needed for pain.  Patient does very well with a low-dose of oxycodone and states it allows her to remain functional.  -Continue gabapentin 300 mg 3 times daily.  The patient was counseled on the risks and potential side effects of gabapentin as well as its importance for dosage titration. Side effects included but were not limited to, drowsiness, sedation, cognitive decline, peripheral edema, weight gain, seizures, with abrupt withdrawal.  Patient was instructed to call the office with any concerns or side effects before abruptly stopping medication.   -Continue duloxetine 90 mg/day.  Tolerating without adverse effects.  -Patient may supplement with extra strength Tylenol.  She will discuss using Tylenol 1-2 times per day as needed with her PCP.  -Encourage patient to utilize lidocaine patches late afternoon  when her pain is more intense.  -Patient will continue physical therapy targeting balance.  -Follow-up in 3 months or sooner if needed.    MEDICAL DECISION MAKING:    My impressions and treatment recommendations were discussed in detail with the patient, who verbalized understanding and had no further questions prior to discharge. Given the patient's report of reduced pain and improved functional ability without adverse effects,  it is reasonable to continue oxycodone 5 mg up to 2 times per day as needed for pain.  The terms of the opioid agreement as well as the potential risks and adverse effects of the patient's medication regimen were discussed in detail. This includes if applicable due to dosage of medication permission to discuss and coordinate care with other treatment providers relevant to the patients condition. The patient verbalized understanding.    Treatment goals were discussed in detail with the patient.  These goals include reduction of pain levels, improved levels of functioning, avoidance of medication side effect and lowest medication dose possible to achieve  these goals.  The patient was in full agreement with these goals.  Also discussed is the understanding that pain may not be eliminated by medication but that the goal of a better sustaining life through use of medication is appropriate.  Lifestyle modifications including weight management, stretching, diet, exercise and smoking are addressed at each office visit.  The patient provided a urine drug screen for routine monitoring and compliance.  This test may be given as frequently as every month based on the patient's individual opiate risk stratification and prescriber concerns for any aberrancies.  This test is indicated given the use of controlled substances for the patient's medical condition.  Unless otherwise noted the prior (s) urine drug testing results were consistent with prescribed medications.  There is no evidence of illicit drug  use or additional medications ingested.     Risks and side effects of chronic opioid therapy including but not limited to tolerance, dependence, constipation, hyperalgesia, cognitive side effects, addiction and possible death due to overuse and or misuse were discussed. I also discussed that such medications when co-administered with other sedative agents including but not limited to alcohol, benzodiazepines, sedative hypnotics and illegal drugs could pose life threatening consequences including death.  I also explained the impact that the administration of such medication has on a patient with obstructive sleep apnea and continued recommendations for use of apnea devices if ordered are prescribed by other physicians.  In order to effectively and safely treat the pain, I also emphasized the importance of compliance with the treatment plan as well as compliance with the terms of the opioid agreement which was reviewed in detail. I explained the importance of being responsible with the medications and to take these only as prescribed, never in excess and never for reasons other than pain reduction. The patient was counseled on keeping the medications safe and locked away from children and other adults as well as disposal methods and options. The patient understood the risks and instructions.      I also discussed with the patient in detail that based on the clinical response to the opioid medications and improvements of activities of daily living, sleep, and work performance in light of compliance with the treatment plan we can continue this form of therapy for the above chronic  pain.  The goal and rationale used for current treatment with chronic opioid medication is to control the pain and alleviate disability induced by the chronic pain condition noted above after failures of other non-opioid and nonpharmacological modalities to treat the chronic pain and the symptoms associated with have failed.  The patient  understood the goals in terms of the above treatment plan and had no further questions prior to leaving the office today.    Given the patient's total MED, general use of daily opiates, or other coadministered medications in various classes the patient was offered a prescription for Narcan.  I instructed the patient that Narcan is for emergency use only and is worse suspected or known opiate overdose.  Call 911 prior to administration of this medication to activate the emergency response team.  It is imperative to fill this medication in order to demonstrate understanding of the gravity of possible side effects including respiratory depression and risk of overdose of this opiate load or medication combination.  As such patients will be required to bring Narcan prescriptions to follow-up appointments as part of the compliance with continued opiate care.    Disclaimer: This note was transcribed using an audio transcription device.  As such, minor errors may be present with regard to spelling, punctuation, and inadvertent word insertion.  Please disregard such errors.             Relevant Medications    oxyCODONE (Roxicodone) 5 mg immediate release tablet (Start on 6/19/2024)    Lumbosacral spondylosis - Primary M47.817    Chronic lumbar radiculopathy M54.16     Other Visit Diagnoses         Codes    Lumbar radiculopathy     M54.16    Relevant Medications    oxyCODONE (Roxicodone) 5 mg immediate release tablet (Start on 6/19/2024)    Lumbar spondylosis     M47.816    Relevant Medications    oxyCODONE (Roxicodone) 5 mg immediate release tablet (Start on 6/19/2024)

## 2024-06-13 NOTE — ASSESSMENT & PLAN NOTE
79-year-old female with history of spinal stenosis as well as polyarticular pain presents for follow-up.  Presents at today's office visit with chronic low back pain which may intermittently radiate to her lower extremities left greater than right accompanied by neuropathy.  Patient denies any increase in lower extremity weakness or changes in bowel/bladder function.  She struggles with chronic urinary incontinence and is being managed by her PCP.  She has noted some improvement in incontinence with the addition of Myrbetriq.  She notices an increase in her pain when she is more active with most of her pain noted late afternoon as well as the middle of the night.  Overall she continues to do well on oxycodone 5 mg twice daily, gabapentin 300 mg 3 times daily, duloxetine 90 mg/day.  We discussed supplementing with extra strength Tylenol in the middle of the night when she gets up to go to the bathroom and notices an increase in her pain.  Patient states that her PCP would like her to limit total Tylenol use to 1 extra strength Tylenol per day.  Advised the patient to contact her PCP and discuss taking an additional Tylenol late afternoon when her pain is more intense.  Patient states that she would not need this daily, however, only when more active.  Reviewed most recent CMP with adequate liver function.  Also advised patient that she may supplement with lidocaine patches late afternoon when her low back pain is most intense.  She is continuing physical therapy targeting balance.  OARRS reviewed no issues.  Plan discussed with patient at today's visit.      -Continue a low-dose of oxycodone 5 mg up to 2 times per day as needed for pain.  Patient does very well with a low-dose of oxycodone and states it allows her to remain functional.  -Continue gabapentin 300 mg 3 times daily.  The patient was counseled on the risks and potential side effects of gabapentin as well as its importance for dosage titration. Side  effects included but were not limited to, drowsiness, sedation, cognitive decline, peripheral edema, weight gain, seizures, with abrupt withdrawal.  Patient was instructed to call the office with any concerns or side effects before abruptly stopping medication.   -Continue duloxetine 90 mg/day.  Tolerating without adverse effects.  -Patient may supplement with extra strength Tylenol.  She will discuss using Tylenol 1-2 times per day as needed with her PCP.  -Encourage patient to utilize lidocaine patches late afternoon when her pain is more intense.  -Patient will continue physical therapy targeting balance.  -Follow-up in 3 months or sooner if needed.    MEDICAL DECISION MAKING:    My impressions and treatment recommendations were discussed in detail with the patient, who verbalized understanding and had no further questions prior to discharge. Given the patient's report of reduced pain and improved functional ability without adverse effects,  it is reasonable to continue oxycodone 5 mg up to 2 times per day as needed for pain.  The terms of the opioid agreement as well as the potential risks and adverse effects of the patient's medication regimen were discussed in detail. This includes if applicable due to dosage of medication permission to discuss and coordinate care with other treatment providers relevant to the patients condition. The patient verbalized understanding.    Treatment goals were discussed in detail with the patient.  These goals include reduction of pain levels, improved levels of functioning, avoidance of medication side effect and lowest medication dose possible to achieve  these goals.  The patient was in full agreement with these goals.  Also discussed is the understanding that pain may not be eliminated by medication but that the goal of a better sustaining life through use of medication is appropriate.  Lifestyle modifications including weight management, stretching, diet, exercise and  smoking are addressed at each office visit.  The patient provided a urine drug screen for routine monitoring and compliance.  This test may be given as frequently as every month based on the patient's individual opiate risk stratification and prescriber concerns for any aberrancies.  This test is indicated given the use of controlled substances for the patient's medical condition.  Unless otherwise noted the prior (s) urine drug testing results were consistent with prescribed medications.  There is no evidence of illicit drug use or additional medications ingested.     Risks and side effects of chronic opioid therapy including but not limited to tolerance, dependence, constipation, hyperalgesia, cognitive side effects, addiction and possible death due to overuse and or misuse were discussed. I also discussed that such medications when co-administered with other sedative agents including but not limited to alcohol, benzodiazepines, sedative hypnotics and illegal drugs could pose life threatening consequences including death.  I also explained the impact that the administration of such medication has on a patient with obstructive sleep apnea and continued recommendations for use of apnea devices if ordered are prescribed by other physicians.  In order to effectively and safely treat the pain, I also emphasized the importance of compliance with the treatment plan as well as compliance with the terms of the opioid agreement which was reviewed in detail. I explained the importance of being responsible with the medications and to take these only as prescribed, never in excess and never for reasons other than pain reduction. The patient was counseled on keeping the medications safe and locked away from children and other adults as well as disposal methods and options. The patient understood the risks and instructions.      I also discussed with the patient in detail that based on the clinical response to the opioid  medications and improvements of activities of daily living, sleep, and work performance in light of compliance with the treatment plan we can continue this form of therapy for the above chronic  pain.  The goal and rationale used for current treatment with chronic opioid medication is to control the pain and alleviate disability induced by the chronic pain condition noted above after failures of other non-opioid and nonpharmacological modalities to treat the chronic pain and the symptoms associated with have failed.  The patient understood the goals in terms of the above treatment plan and had no further questions prior to leaving the office today.    Given the patient's total MED, general use of daily opiates, or other coadministered medications in various classes the patient was offered a prescription for Narcan.  I instructed the patient that Narcan is for emergency use only and is worse suspected or known opiate overdose.  Call 911 prior to administration of this medication to activate the emergency response team.  It is imperative to fill this medication in order to demonstrate understanding of the gravity of possible side effects including respiratory depression and risk of overdose of this opiate load or medication combination.  As such patients will be required to bring Narcan prescriptions to follow-up appointments as part of the compliance with continued opiate care.    Disclaimer: This note was transcribed using an audio transcription device.  As such, minor errors may be present with regard to spelling, punctuation, and inadvertent word insertion.  Please disregard such errors.

## 2024-06-19 ENCOUNTER — APPOINTMENT (OUTPATIENT)
Dept: GASTROENTEROLOGY | Facility: CLINIC | Age: 79
End: 2024-06-19
Payer: MEDICARE

## 2024-06-19 VITALS
HEART RATE: 110 BPM | DIASTOLIC BLOOD PRESSURE: 86 MMHG | SYSTOLIC BLOOD PRESSURE: 170 MMHG | WEIGHT: 162 LBS | BODY MASS INDEX: 26.15 KG/M2

## 2024-06-19 DIAGNOSIS — T40.2X5A THERAPEUTIC OPIOID INDUCED CONSTIPATION: Primary | ICD-10-CM

## 2024-06-19 DIAGNOSIS — K59.09 CHRONIC CONSTIPATION: ICD-10-CM

## 2024-06-19 DIAGNOSIS — K59.03 THERAPEUTIC OPIOID INDUCED CONSTIPATION: Primary | ICD-10-CM

## 2024-06-19 PROCEDURE — 1159F MED LIST DOCD IN RCRD: CPT | Performed by: INTERNAL MEDICINE

## 2024-06-19 PROCEDURE — 3079F DIAST BP 80-89 MM HG: CPT | Performed by: INTERNAL MEDICINE

## 2024-06-19 PROCEDURE — 1123F ACP DISCUSS/DSCN MKR DOCD: CPT | Performed by: INTERNAL MEDICINE

## 2024-06-19 PROCEDURE — 1157F ADVNC CARE PLAN IN RCRD: CPT | Performed by: INTERNAL MEDICINE

## 2024-06-19 PROCEDURE — 3077F SYST BP >= 140 MM HG: CPT | Performed by: INTERNAL MEDICINE

## 2024-06-19 PROCEDURE — 99204 OFFICE O/P NEW MOD 45 MIN: CPT | Performed by: INTERNAL MEDICINE

## 2024-06-19 RX ORDER — LUBIPROSTONE 8 UG/1
8 CAPSULE ORAL
Qty: 60 CAPSULE | Refills: 11 | Status: SHIPPED | OUTPATIENT
Start: 2024-06-19 | End: 2025-06-19

## 2024-06-19 NOTE — PATIENT INSTRUCTIONS
Trial of Amitiza 8mcg BID take for at least one week for OIC       Follow up in 8 weeks       If you have any questions please call my office at [phone].

## 2024-06-19 NOTE — PROGRESS NOTES
"    [facility]enterology    ASSESSMENT and PLAN:       Robyn Madera is a 79 year old female with a history of HTN, chronic constipation, asthma, pelvic floor dysfunction, RBBB, HLD, Graves disease, back pain/lumbar stenosis, and GERD who presents for consultation requested by her PCP (Reanna Martinez) for the evaluation of chronic constipation\".    1. Opoid induced Constipation  Symptoms consistent with constipation and pelvic floor dysfunction is likely playing a role as well. No evidence of more concerning etiology. She has had some benefit from MiraLAX and stool softeners,   - Continue with pelvic floor exercises   - Has failed  MiraLAX and stool softeners, discussed titration  - recommend lifestyle changes including fiber supplement, increased hydration, and increased physical activity patient has tried this and had np improvement   - Trial of Amitiza 8mcg  BID         2. Pancreatic Cyst   - Patient with repeat MRCP now     Ismael Pepper DO         Gastroenterology    [facility]            Subjective   HISTORY OF PRESENT ILLNESS:     Chief Complaint  Abdominal Pain and Constipation    History Of Present Illness:    Robyn Madera is a 75 year old female with a history of HTN, chronic constipation, asthma, pelvic floor dysfunction, RBBB, HLD, Graves disease, back pain/lumbar stenosis, and GERD who presents for consultation requested by her PCP (Reanna Martinez) for the evaluation of chronic constipation\".               Labs have shown an unremarkable CMP, low TSH (normal free T4), and unremarkable CBC with no anemia. Colonoscopy on 8/10/2020 was normal and random biopsies at that time were normal.     She reports that she has chronic abdominal discomfort and bloating which worsened when her constipation worsened after starting on pain medications.     Ultimately this has been worse over the past 6 months. She was initially having a BM " every 3 days, but now has been having a BM every other day. On some days the stool is soft and easy to pass, but on others it is small and hard. On those days she will have a lot of straining and she reports that she can feel it there, but can't pass it. She will have to sit on the toilet for a long time and move around to different positions to finally allow stool to pass. She has started using MiraLAX (1 capful per day) and she is also taking stool softeners daily. This has helped a little. Her pain is mostly lower in her abdomen and below the umbilicus. It is cramping and bloating. This improves following a BM and is usually worse when she is more constipated. She will also have abdominal bloating and cramping. No blood in her stools.     Patient does pelvic floor exercise with some improvident       She is maintained on oral Oxy 5 for chronic pain.     Patient denies any heartburn/GERD, N/V, dysphagia, odynophagia, diarrhea, hematemesis, hematochezia, melena, or weight loss.     Patient denies any family history of GI disease including specifically denying IBD, colorectal cancer, esophageal cancer, and gastric cancer.            Patient denies any heartburn/GERD, N/V, dysphagia, odynophagia, abdominal pain, diarrhea,  hematemesis, hematochezia, melena, or weight loss.      Endoscopy History:    EGD D in 2023 with Dr. Rollins gastric polyps seen all biopsies within normal limits    Review of systems:   Review of Systems      I performed a complete 10 point review of systems and it is negative except as noted in HPI or above.        PAST HISTORIES:       Past Medical History:  She has a past medical history of Abnormal findings on diagnostic imaging of heart and coronary circulation (11/07/2019), Abnormal levels of other serum enzymes (10/24/2019), Allergic rhinitis due to animal hair or dander (10/28/2013), Anxiety (2023), Arthritis (2016), Combined form of senile cataract of both eyes (10/29/2014), Disease of  thyroid gland (2018), Diverticulosis of colon (06/17/2013), Diverticulosis of intestine, part unspecified, without perforation or abscess without bleeding, Gastric polyp (06/23/2014), GERD (gastroesophageal reflux disease) (2008), Glaucoma suspect, both eyes (01/08/2015), HL (hearing loss) (2022), squamous cell carcinoma of skin (06/09/2015), Hypertension (2015), Internal hemorrhoid (03/21/2016), Low TSH level (03/10/2023), Lung nodule, solitary (03/10/2023), Lung nodule, solitary (03/10/2023), Mild intermittent asthma without complication (HHS-HCC) (02/24/2016), Other microscopic hematuria (03/01/2018), Other specified abnormal findings of blood chemistry (10/19/2021), Other specified abnormal findings of blood chemistry (10/19/2021), Pain in right knee, Personal history of other diseases of the digestive system, Personal history of other diseases of the musculoskeletal system and connective tissue (01/17/2020), Personal history of other diseases of the musculoskeletal system and connective tissue, Personal history of other diseases of the musculoskeletal system and connective tissue, Personal history of other diseases of the nervous system and sense organs, Personal history of other diseases of the nervous system and sense organs, Personal history of other diseases of the respiratory system, Personal history of other endocrine, nutritional and metabolic disease, Personal history of other endocrine, nutritional and metabolic disease, Personal history of other endocrine, nutritional and metabolic disease, Personal history of other malignant neoplasm of skin, Personal history of other mental and behavioral disorders, Presence of right artificial knee joint (09/21/2017), Scoliosis (2008), Visual impairment (2008), and Wrist sprain (03/10/2023).    Past Surgical History:  She has a past surgical history that includes Knee arthroscopy w/ debridement (07/09/2016); Other surgical history (10/14/2019); and Joint  replacement (2017-18).      Social History:  She reports that she has never smoked. She has never used smokeless tobacco. She reports that she does not currently use alcohol. She reports that she does not use drugs.    Family History:  No known GI disease, specifically denies pancreatitis, Crohn's, colon cancer, gastroesophageal cancer, or ulcerative colitis.    Family History   Problem Relation Name Age of Onset   • Diabetes Mother Jaci Bingham         Allergies:  House dust, Adhesive tape-silicones, Cat dander, and Penicillins        Objective   OBJECTIVE:       Last Recorded Vitals:  Vitals:    06/19/24 1520   BP: 170/86   Pulse: 110   Weight: 73.5 kg (162 lb)     /86   Pulse 110   Wt 73.5 kg (162 lb)   BMI 26.15 kg/m²      Physical Exam:    Physical Exam  Vitals reviewed.   Constitutional:       General: She is awake.      Appearance: Normal appearance.   HENT:      Head: Normocephalic.      Mouth/Throat:      Mouth: Mucous membranes are moist.   Eyes:      Extraocular Movements: Extraocular movements intact.   Cardiovascular:      Rate and Rhythm: Normal rate.      Heart sounds: Normal heart sounds.   Pulmonary:      Effort: Pulmonary effort is normal.      Breath sounds: Normal breath sounds.   Abdominal:      General: Bowel sounds are normal.      Palpations: Abdomen is soft.      Tenderness: There is no abdominal tenderness. There is no guarding or rebound.      Hernia: No hernia is present.   Musculoskeletal:         General: Normal range of motion.      Cervical back: Neck supple.   Skin:     General: Skin is warm and dry.   Neurological:      General: No focal deficit present.      Mental Status: She is alert.   Psychiatric:         Attention and Perception: Attention and perception normal.         Mood and Affect: Mood normal.         Behavior: Behavior normal.             Home Medications:  Prior to Admission medications    Medication Sig Start Date End Date Taking? Authorizing Provider    amLODIPine (Norvasc) 10 mg tablet Take 1 tablet (10 mg) by mouth once daily. 8/21/23 8/20/24  Reanna Martinez MD   aspirin 81 mg EC tablet Take 1 tablet (81 mg) by mouth once daily.    Historical Provider, MD   atorvastatin (Lipitor) 40 mg tablet Take 1 tablet (40 mg) by mouth once daily at bedtime. 8/21/23 8/20/24  Reanna Martinez MD   busPIRone (Buspar) 15 mg tablet Take 1 tablet (15 mg) by mouth 2 times a day. 6/10/24 12/7/24  Reanan Martinez MD   calcium carbonate-vitamin D3 500 mg-3.125 mcg (125 unit) tablet tablet Take by mouth.    Historical Provider, MD   cyanocobalamin (Vitamin B-12) 1,000 mcg tablet Take 1 tablet (1,000 mcg) by mouth once daily.    Historical Provider, MD   diazePAM (Valium) 5 mg tablet Take 1 tablet (5 mg) by mouth 1 time for 1 dose. 6/10/24 6/10/24  Reanna Martinez MD   docusate sodium (Colace) 100 mg capsule Take by mouth twice a day.    Historical Provider, MD   DULoxetine (Cymbalta) 30 mg DR capsule Take 1 capsule (30 mg) by mouth once daily. Do not crush or chew.  Patient will take duloxetine 30 mg and 60 mg capsule daily for a total dose of 90 mg daily.   Please hold until fill date Do not start before March 29, 2024. 3/29/24 6/27/24  Cooper Merchant MD   DULoxetine (Cymbalta) 60 mg DR capsule Take 1 capsule (60 mg) by mouth once daily. Do not crush or chew.  Will take duloxetine 60 mg and 30 mg capsule daily for a total dose of 90 mg daily.  Please hold until fill date Do not start before March 29, 2024. 3/29/24 6/27/24  Cooper Merchant MD   esomeprazole (NexIUM) 20 mg DR capsule Take 1 capsule (20 mg) by mouth once daily.    Historical Provider, MD   gabapentin (Neurontin) 100 mg capsule Take 3 capsules (300 mg) by mouth 3 times a day. 12/11/23 12/10/24  Cooper Merchant MD   mirabegron (Myrbetriq) 25 mg tablet extended release 24 hr 24 hr tablet Take 1 tablet (25 mg) by mouth 2 times a day.  Patient not taking: Reported on 6/13/2024 6/4/24 6/4/25  Gino  "MD Waldo   mirabegron (Myrbetriq) 50 mg tablet extended release 24 hr 24 hr tablet Take 1 tablet (50 mg) by mouth once daily. 2/27/24 2/21/25  Gino Haas MD   oxyCODONE (Roxicodone) 5 mg immediate release tablet Take 1 tablet (5 mg) by mouth 2 times a day as needed for severe pain (7 - 10). Do not fill before June 19, 2024. 6/19/24 7/19/24  ELENI Cody-CNP, APRN-CNS   polyethylene glycol (Glycolax) 17 gram/dose powder Take by mouth every other day. 2/28/20   Hetal Cabrera MD   oxyCODONE (Roxicodone) 5 mg immediate release tablet Take 1 tablet (5 mg) by mouth 2 times a day as needed for severe pain (7 - 10). Do not fill before May 20, 2024. 5/20/24 6/13/24  BERNARDO Cody, ELENI-CNS         Relevant Results Recent labs reviewed in the EMR.  Lab Results   Component Value Date    HGB 13.7 06/03/2024    HGB 13.4 03/01/2024    HGB 14.3 08/25/2023    HGB 13.5 08/14/2023    HGB 13.3 08/09/2022    MCV 97 06/03/2024    MCV 98 03/01/2024    MCV 95 08/25/2023    MCV 97 08/14/2023    MCV 97 08/09/2022     06/03/2024     03/01/2024     08/25/2023     08/14/2023     08/09/2022       No results found for: \"FERRITIN\", \"IRON\"    Lab Results   Component Value Date     06/03/2024    K 4.1 06/03/2024     06/03/2024    BUN 14 06/03/2024    CREATININE 0.68 06/03/2024       Lab Results   Component Value Date    BILITOT 0.7 06/03/2024     Lab Results   Component Value Date    ALT 17 06/03/2024    ALT 19 03/01/2024    ALT 19 08/14/2023    ALT 20 02/13/2023    ALT 29 08/09/2022    AST 20 06/03/2024    AST 24 03/01/2024    AST 24 08/14/2023    AST 26 02/13/2023    AST 34 08/09/2022    ALKPHOS 83 06/03/2024    ALKPHOS 75 03/01/2024    ALKPHOS 69 08/14/2023    ALKPHOS 73 02/13/2023    ALKPHOS 80 08/09/2022       No results found for: \"CRP\"    No results found for: \"CALPS\"    Radiology: Reviewed imaging reviewed in the EMR.  No results found.      "

## 2024-06-20 DIAGNOSIS — M79.2 NEUROPATHIC PAIN: ICD-10-CM

## 2024-06-20 NOTE — TELEPHONE ENCOUNTER
Pt is requesting refill of   duloxetine 30 mg 1 po daily & duloxetine 60 mg 1 po daily Awa Waddell                                                        LV:   6/13/24                  NV:  9/16/24                                        Pended RX to MIKHAIL Hermosillo for transmission to pharmacy.

## 2024-06-21 RX ORDER — DULOXETIN HYDROCHLORIDE 60 MG/1
60 CAPSULE, DELAYED RELEASE ORAL DAILY
Qty: 90 CAPSULE | Refills: 0 | Status: SHIPPED | OUTPATIENT
Start: 2024-06-21 | End: 2024-09-19

## 2024-06-21 RX ORDER — DULOXETIN HYDROCHLORIDE 30 MG/1
30 CAPSULE, DELAYED RELEASE ORAL DAILY
Qty: 90 CAPSULE | Refills: 0 | Status: SHIPPED | OUTPATIENT
Start: 2024-06-21 | End: 2024-09-19

## 2024-06-26 ENCOUNTER — APPOINTMENT (OUTPATIENT)
Dept: PHARMACY | Facility: HOSPITAL | Age: 79
End: 2024-06-26
Payer: MEDICARE

## 2024-06-26 DIAGNOSIS — N32.81 OAB (OVERACTIVE BLADDER): ICD-10-CM

## 2024-06-26 RX ORDER — CYANOCOBALAMIN (VITAMIN B-12) 500 MCG
1600 TABLET ORAL DAILY
COMMUNITY

## 2024-06-26 NOTE — PROGRESS NOTES
Patient ID: Robyn Madera is a 79 y.o. female who presents for No chief complaint on file..    Referring Provider: Dr. Haas   Pt was referred for cost assistance.    Preferred Pharmacy:    [id number] [place] [address] AT [facility] [address] [facility]  [address] [address]  Phone: [phone] Fax: [phone]      Copay assistance:   Do you have copays on your medications? Yes  Do you have trouble affording your current medications? Yes     Patient Assistance Screening (VAF)    Patient verbally reports monthly or yearly income which is less than 400% federal poverty level   Application for program has been submitted for the following medications:   Myrbetriq   Patient has been informed that program team will be reaching out to them to discuss necessary documentation, instructed to answer phone/return voicemail.   Patient aware this process may take up to 6 weeks.   If approved medication must be filled through [facility] pharmacy and may be picked up or mailed to patient.       Subjective      Urinary Symptoms  How long have you been experiencing symptoms? 2-3 years   What medications have been tried/stopped?   Gemtesa - wasn't making a difference   Current medication? Myrbetriq 50 mg once daily   When started? March 2024  Side effects? No   Improvement in symptoms: Improvement in frequency and urgency     Bone Health  Osteopenia or osteoporosis: Osteopenia     === 03/22/23 ===    BONE DENSITY (Preliminary)      Cardiovascular Health  The 10-year ASCVD risk score ([place] GONZALEZ, et al., 2019) is: 42.1%    Values used to calculate the score:      Age: 79 years      Sex: Female      Is Non- : No      Diabetic: No      Tobacco smoker: No      Systolic Blood Pressure: 170 mmHg      Is BP treated: Yes      HDL Cholesterol: 83.3 mg/dL      Total Cholesterol: 162 mg/dL    Lab Results   Component Value Date    CHOL 162 06/03/2024     Lab Results  "  Component Value Date    HDL 83.3 06/03/2024     Lab Results   Component Value Date    LDLCALC 64 06/03/2024     Lab Results   Component Value Date    TRIG 73 06/03/2024     No components found for: \"CHOLHDL\"        Blood Sugar Balance  Lab Results   Component Value Date    GLUCOSE 135 (H) 06/03/2024    HGBA1C 6.4 (H) 06/03/2024    HGBA1C 6.5 (H) 03/01/2024    HGBA1C 6.1 (H) 11/20/2023     No results found for: \"LEPTIN\", \"INSULFAST\", \"GLUF\"      Thyroid  Lab Results   Component Value Date    TSH 0.24 (L) 06/03/2024    FREET4 0.73 06/03/2024    T3FREE 3.3 06/03/2024    THYROIDPAB >1,000 (H) 11/30/2023       Iron Status  No results found for: \"IRON\", \"TIBC\", \"FERRITIN\"     Kidney Function  Lab Results   Component Value Date    GFRF 83 08/25/2023    CREATININE 0.68 06/03/2024       Potassium  Lab Results   Component Value Date    K 4.1 06/03/2024        Vitamin D3  No results found for: \"VITD25\"    Current Outpatient Medications on File Prior to Visit   Medication Sig Dispense Refill    amLODIPine (Norvasc) 10 mg tablet Take 1 tablet (10 mg) by mouth once daily. 90 tablet 3    aspirin 81 mg EC tablet Take 1 tablet (81 mg) by mouth once daily.      atorvastatin (Lipitor) 40 mg tablet Take 1 tablet (40 mg) by mouth once daily at bedtime. 90 tablet 3    busPIRone (Buspar) 15 mg tablet Take 1 tablet (15 mg) by mouth 2 times a day. 180 tablet 1    calcium carbonate-vitamin D3 500 mg-3.125 mcg (125 unit) tablet tablet Take by mouth.      cyanocobalamin (Vitamin B-12) 1,000 mcg tablet Take 1 tablet (1,000 mcg) by mouth once daily.      diazePAM (Valium) 5 mg tablet Take 1 tablet (5 mg) by mouth 1 time for 1 dose. 2 tablet 0    docusate sodium (Colace) 100 mg capsule Take by mouth twice a day.      DULoxetine (Cymbalta) 30 mg DR capsule Take 1 capsule (30 mg) by mouth once daily. Do not crush or chew.  Patient will take duloxetine 30 mg and 60 mg capsule daily for a total dose of 90 mg daily.   Please hold until fill date 90 " capsule 0    DULoxetine (Cymbalta) 60 mg DR capsule Take 1 capsule (60 mg) by mouth once daily. Do not crush or chew.  Will take duloxetine 60 mg and 30 mg capsule daily for a total dose of 90 mg daily.  Please hold until fill date 90 capsule 0    esomeprazole (NexIUM) 20 mg DR capsule Take 1 capsule (20 mg) by mouth once daily.      gabapentin (Neurontin) 100 mg capsule Take 3 capsules (300 mg) by mouth 3 times a day. 810 capsule 3    lubiprostone (Amitiza) 8 mcg capsule Take 1 capsule (8 mcg) by mouth 2 times daily (morning and late afternoon). Take with meals 60 capsule 11    mirabegron (Myrbetriq) 25 mg tablet extended release 24 hr 24 hr tablet Take 1 tablet (25 mg) by mouth 2 times a day. 180 tablet 3    mirabegron (Myrbetriq) 50 mg tablet extended release 24 hr 24 hr tablet Take 1 tablet (50 mg) by mouth once daily. 90 tablet 3    oxyCODONE (Roxicodone) 5 mg immediate release tablet Take 1 tablet (5 mg) by mouth 2 times a day as needed for severe pain (7 - 10). Do not fill before June 19, 2024. 60 tablet 0    polyethylene glycol (Glycolax) 17 gram/dose powder Take by mouth every other day.      [DISCONTINUED] DULoxetine (Cymbalta) 30 mg DR capsule Take 1 capsule (30 mg) by mouth once daily. Do not crush or chew.  Patient will take duloxetine 30 mg and 60 mg capsule daily for a total dose of 90 mg daily.   Please hold until fill date Do not start before March 29, 2024. 90 capsule 0    [DISCONTINUED] DULoxetine (Cymbalta) 60 mg DR capsule Take 1 capsule (60 mg) by mouth once daily. Do not crush or chew.  Will take duloxetine 60 mg and 30 mg capsule daily for a total dose of 90 mg daily.  Please hold until fill date Do not start before March 29, 2024. 90 capsule 0     No current facility-administered medications on file prior to visit.        Medication and allergy reconciliation completed     Drug Interactions   No significant drug interactions identified    Assessment/Plan     Patient is experiencing urinary  incontinence.   Has tried before Gemtesa  Based on conversation/screening for [facility] patient will not qualify as income is above threshold     Myrbetriq  Discussed MOA: activates beta-3 adrenergic receptors in the bladder resulting in relaxation of the detrusor smooth muscle during the urine storage phase, thus increasing bladder capacity.  Provided education on administration (swallow whole with water; do not chew, divide, or crush) and potential side effects including but not limited to headache, nose or throat irritation, dry mouth, and constipation. Any signs or symptoms of angioedema- immediately discontinue use and seek immediate medical attention.   Monitor blood pressure and heart rate. May notice a slight increase. Please call me if blood pressure becomes >120/80 mmHg or pulse significantly elevates from baseline.   BP Readings from Last 3 Encounters:   06/19/24 170/86   06/13/24 150/71   06/10/24 134/80     Caution with other meds that prolong QT interval- many drug:drug interactions   Patient is in agreement that they will notify me if starting any new medications  Efficacy is seen within 8 weeks; steady state achieved within 7 days.      LABS  Lab Results   Component Value Date    GFRF 83 08/25/2023     Make sure GFR >15 ml/min or dose adjust    CONTINUE  Myrbetriq  50 mg once daily      Follow-up: as needed      Time spent with pt: Total length of time 40 (minutes) of the encounter and more than 50% was spent counseling the patient.      Sharyn Nunn, PharmD      Continue all meds under the continuation of care with the referring provider and clinical pharmacy team.    Verbal consent to manage patient's drug therapy was obtained from the patient and/or an individual authorized to act on behalf of a patient. They were informed they may decline to participate or withdraw from participation in pharmacy services at any time.

## 2024-06-27 ENCOUNTER — APPOINTMENT (OUTPATIENT)
Dept: VASCULAR SURGERY | Facility: CLINIC | Age: 79
End: 2024-06-27
Payer: MEDICARE

## 2024-06-27 VITALS
DIASTOLIC BLOOD PRESSURE: 84 MMHG | BODY MASS INDEX: 25.99 KG/M2 | HEART RATE: 121 BPM | SYSTOLIC BLOOD PRESSURE: 153 MMHG | WEIGHT: 161 LBS

## 2024-06-27 DIAGNOSIS — I87.1 MAY-THURNER SYNDROME: Primary | ICD-10-CM

## 2024-06-27 PROCEDURE — 1157F ADVNC CARE PLAN IN RCRD: CPT | Performed by: SURGERY

## 2024-06-27 PROCEDURE — 3077F SYST BP >= 140 MM HG: CPT | Performed by: SURGERY

## 2024-06-27 PROCEDURE — 1036F TOBACCO NON-USER: CPT | Performed by: SURGERY

## 2024-06-27 PROCEDURE — 99214 OFFICE O/P EST MOD 30 MIN: CPT | Performed by: SURGERY

## 2024-06-27 PROCEDURE — 1123F ACP DISCUSS/DSCN MKR DOCD: CPT | Performed by: SURGERY

## 2024-06-27 PROCEDURE — 1159F MED LIST DOCD IN RCRD: CPT | Performed by: SURGERY

## 2024-06-27 PROCEDURE — 3079F DIAST BP 80-89 MM HG: CPT | Performed by: SURGERY

## 2024-06-27 NOTE — PROGRESS NOTES
Subjective   Patient ID: Robyn Madera is a 79 y.o. female who presents for Follow-up (3 mo venous insuff ).  HPI  Patient presents with continued progressive edema in both lower extremities left significantly worse than right.  States definitely worse at the end of the day and prolonged standing seated positioning however slight improvement noted first thing in the morning.  Patient is had previous pretibial ulcerations at this time healed  Otherwise active and ambulatory without evidence of claudication  No evidence of rest pain  Currently no active ulcers noted.    Review of Systems    Objective   Physical Exam  Physical exam    Constitutional: alert and in no acute distress verbal  Eyes: No erythema swelling or discharge noted  Neck: supple, symmetric, trachea midline, no masses noted  Cardiovascular: Carotid pulses 2+, no obvious bruit, no Jugular distension noted, no thrill, heart regular rate, lower extremity vascular exam intact, cap refill <2 sec  Pulmonary:  Bilateral breath sounds intact, clear with rales rhonchi or wheeze  Abdomen: soft non tender, no pulsatile masses noted, no rebound rigidity or guarding noted  Skin: intact warm no abnormal turgor  Psychiatric: alert without any obvious cognitive issues, oriented to person, place, and time  1+ pretibial edema bilateral lower extremities left worse than right    Assessment/Plan   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           Jagjit Tolbert DO 06/27/24 3:37 PM

## 2024-07-03 ENCOUNTER — TELEPHONE (OUTPATIENT)
Dept: GASTROENTEROLOGY | Facility: CLINIC | Age: 79
End: 2024-07-03
Payer: MEDICARE

## 2024-07-03 NOTE — TELEPHONE ENCOUNTER
"Patient called in stating that she almost went to the ER today for very hard, large stools that are \"as if they are stuck in there\" and cannot get them out. She stool the miralax and stool softner as you told her to when starting amitiza. She wants to know if she should take them or something like milk of mag? Please advise  "

## 2024-07-03 NOTE — TELEPHONE ENCOUNTER
Per verbal discussion with ellis pabon since provider is gone.  She advised her to restart miralax and stool softner then call back next week to update us on how she is doing. Pt verbalized understanding

## 2024-07-08 ENCOUNTER — HOSPITAL ENCOUNTER (OUTPATIENT)
Dept: RADIOLOGY | Facility: CLINIC | Age: 79
Discharge: HOME | End: 2024-07-08
Payer: MEDICARE

## 2024-07-08 VITALS — HEIGHT: 65 IN | WEIGHT: 161 LBS | BODY MASS INDEX: 26.82 KG/M2

## 2024-07-08 DIAGNOSIS — Z12.31 ENCOUNTER FOR SCREENING MAMMOGRAM FOR MALIGNANT NEOPLASM OF BREAST: ICD-10-CM

## 2024-07-08 DIAGNOSIS — Z12.39 BREAST SCREENING: ICD-10-CM

## 2024-07-08 DIAGNOSIS — K86.2 PANCREATIC CYST (HHS-HCC): ICD-10-CM

## 2024-07-08 PROCEDURE — 74183 MRI ABD W/O CNTR FLWD CNTR: CPT | Performed by: RADIOLOGY

## 2024-07-08 PROCEDURE — 2550000001 HC RX 255 CONTRASTS: Performed by: FAMILY MEDICINE

## 2024-07-08 PROCEDURE — 77067 SCR MAMMO BI INCL CAD: CPT

## 2024-07-08 PROCEDURE — A9575 INJ GADOTERATE MEGLUMI 0.1ML: HCPCS | Performed by: FAMILY MEDICINE

## 2024-07-08 PROCEDURE — 77063 BREAST TOMOSYNTHESIS BI: CPT | Performed by: RADIOLOGY

## 2024-07-08 PROCEDURE — 74183 MRI ABD W/O CNTR FLWD CNTR: CPT

## 2024-07-08 PROCEDURE — 76376 3D RENDER W/INTRP POSTPROCES: CPT | Performed by: RADIOLOGY

## 2024-07-08 PROCEDURE — 77067 SCR MAMMO BI INCL CAD: CPT | Performed by: RADIOLOGY

## 2024-07-08 RX ORDER — GADOTERATE MEGLUMINE 376.9 MG/ML
0.2 INJECTION INTRAVENOUS
Status: COMPLETED | OUTPATIENT
Start: 2024-07-08 | End: 2024-07-08

## 2024-07-16 DIAGNOSIS — M54.16 LUMBAR RADICULOPATHY: ICD-10-CM

## 2024-07-16 DIAGNOSIS — M48.061 SPINAL STENOSIS OF LUMBAR REGION, UNSPECIFIED WHETHER NEUROGENIC CLAUDICATION PRESENT: ICD-10-CM

## 2024-07-16 DIAGNOSIS — M47.816 LUMBAR SPONDYLOSIS: ICD-10-CM

## 2024-07-16 NOTE — TELEPHONE ENCOUNTER
Pt is requesting refill of   oxycodone 5 mg IR 1 tablet po BID Awa Waddell                                                        LV:   6/13/24                 NV:  9/16/24               OARRS reviewed with L[facility]:  6/19/24 #60/30 days                          Pended RX to MIKHAIL Hermosillo for transmission to pharmacy.

## 2024-07-17 ENCOUNTER — TELEPHONE (OUTPATIENT)
Dept: VASCULAR SURGERY | Facility: CLINIC | Age: 79
End: 2024-07-17
Payer: MEDICARE

## 2024-07-17 ENCOUNTER — PATIENT MESSAGE (OUTPATIENT)
Dept: VASCULAR SURGERY | Facility: CLINIC | Age: 79
End: 2024-07-17
Payer: MEDICARE

## 2024-07-17 DIAGNOSIS — K86.2 PANCREATIC CYST (HHS-HCC): Primary | ICD-10-CM

## 2024-07-17 RX ORDER — OXYCODONE HYDROCHLORIDE 5 MG/1
5 TABLET ORAL 2 TIMES DAILY PRN
Qty: 60 TABLET | Refills: 0 | Status: SHIPPED | OUTPATIENT
Start: 2024-07-19 | End: 2024-08-18

## 2024-07-17 NOTE — TELEPHONE ENCOUNTER
PROCEDURE 8/9/24  FUV 8/22/24 1PM   PRE OP THROUGH [facility]    ----- Message from Jagjit Tolbert sent at 7/15/2024 11:41 AM EDT -----  Regarding: RE: VENOGRAM (8/9/24)  opened  ----- Message -----  From: Aparna Allen LPN  Sent: 7/11/2024   4:45 PM EDT  To: Jagjit Tolbert, DO; Aparna Allen LPN  Subject: VENOGRAM (8/9/24)                                PLEASE START CASE FOR 8/9/24    NO CLEARANCE   FUV 8/22/24 1PM   PRE OP THROUGH [facility]

## 2024-07-17 NOTE — TELEPHONE ENCOUNTER
Okay to refill oxycodone 5 mg up to 2 times per day as needed for pain #60 for 30 days with start date 7/19/2024.

## 2024-08-05 ENCOUNTER — PRE-ADMISSION TESTING (OUTPATIENT)
Dept: PREADMISSION TESTING | Facility: HOSPITAL | Age: 79
End: 2024-08-05
Payer: MEDICARE

## 2024-08-05 ENCOUNTER — ANESTHESIA EVENT (OUTPATIENT)
Dept: OPERATING ROOM | Facility: HOSPITAL | Age: 79
End: 2024-08-05
Payer: MEDICARE

## 2024-08-05 VITALS
RESPIRATION RATE: 20 BRPM | DIASTOLIC BLOOD PRESSURE: 64 MMHG | OXYGEN SATURATION: 95 % | HEART RATE: 91 BPM | HEIGHT: 65 IN | SYSTOLIC BLOOD PRESSURE: 130 MMHG | BODY MASS INDEX: 27.16 KG/M2 | TEMPERATURE: 98.4 F | WEIGHT: 163 LBS

## 2024-08-05 DIAGNOSIS — I87.1 MAY-THURNER SYNDROME: ICD-10-CM

## 2024-08-05 DIAGNOSIS — I45.2 RBBB (RIGHT BUNDLE BRANCH BLOCK WITH LEFT ANTERIOR FASCICULAR BLOCK): ICD-10-CM

## 2024-08-05 DIAGNOSIS — R60.0 LOWER LEG EDEMA: ICD-10-CM

## 2024-08-05 DIAGNOSIS — Z01.818 PRE-OP EVALUATION: Primary | ICD-10-CM

## 2024-08-05 DIAGNOSIS — E06.3 HASHIMOTO'S THYROIDITIS: ICD-10-CM

## 2024-08-05 DIAGNOSIS — I10 BENIGN ESSENTIAL HYPERTENSION: ICD-10-CM

## 2024-08-05 DIAGNOSIS — G62.9 NEUROPATHY: ICD-10-CM

## 2024-08-05 LAB
ANION GAP SERPL CALC-SCNC: 9 MMOL/L (ref 10–20)
BUN SERPL-MCNC: 15 MG/DL (ref 6–23)
CALCIUM SERPL-MCNC: 9.2 MG/DL (ref 8.6–10.3)
CHLORIDE SERPL-SCNC: 104 MMOL/L (ref 98–107)
CO2 SERPL-SCNC: 29 MMOL/L (ref 21–32)
CREAT SERPL-MCNC: 0.72 MG/DL (ref 0.5–1.05)
EGFRCR SERPLBLD CKD-EPI 2021: 86 ML/MIN/1.73M*2
ERYTHROCYTE [DISTWIDTH] IN BLOOD BY AUTOMATED COUNT: 14 % (ref 11.5–14.5)
GLUCOSE SERPL-MCNC: 115 MG/DL (ref 74–99)
HCT VFR BLD AUTO: 40.1 % (ref 36–46)
HGB BLD-MCNC: 13.3 G/DL (ref 12–16)
MCH RBC QN AUTO: 31.9 PG (ref 26–34)
MCHC RBC AUTO-ENTMCNC: 33.2 G/DL (ref 32–36)
MCV RBC AUTO: 96 FL (ref 80–100)
NRBC BLD-RTO: 0 /100 WBCS (ref 0–0)
PLATELET # BLD AUTO: 215 X10*3/UL (ref 150–450)
POTASSIUM SERPL-SCNC: 4.3 MMOL/L (ref 3.5–5.3)
RBC # BLD AUTO: 4.17 X10*6/UL (ref 4–5.2)
SODIUM SERPL-SCNC: 138 MMOL/L (ref 136–145)
WBC # BLD AUTO: 5.2 X10*3/UL (ref 4.4–11.3)

## 2024-08-05 PROCEDURE — 80048 BASIC METABOLIC PNL TOTAL CA: CPT

## 2024-08-05 PROCEDURE — 85027 COMPLETE CBC AUTOMATED: CPT

## 2024-08-05 PROCEDURE — 99204 OFFICE O/P NEW MOD 45 MIN: CPT | Performed by: CLINICAL NURSE SPECIALIST

## 2024-08-05 PROCEDURE — 36415 COLL VENOUS BLD VENIPUNCTURE: CPT

## 2024-08-05 ASSESSMENT — ENCOUNTER SYMPTOMS
WEAKNESS: 1
CONSTITUTIONAL NEGATIVE: 1
HEMATOLOGIC/LYMPHATIC NEGATIVE: 1
RESPIRATORY NEGATIVE: 1
NUMBNESS: 1
ALLERGIC/IMMUNOLOGIC NEGATIVE: 1
GASTROINTESTINAL NEGATIVE: 1
ARTHRALGIAS: 1
PSYCHIATRIC NEGATIVE: 1
EYES NEGATIVE: 1
ENDOCRINE NEGATIVE: 1
MYALGIAS: 1

## 2024-08-05 ASSESSMENT — LIFESTYLE VARIABLES: SMOKING_STATUS: NONSMOKER

## 2024-08-05 NOTE — H&P
History Of Present Illness  Robyn Madera is a very pleasant 79 y.o. female presenting with severe lower leg edema, numbness and tingling. Legs feel heavy and cause difficulty with ambulation. Complaints of significant leg fatigue. Scheduled for bilateral lower leg venograms with possible stent placement under MAC/general anesthesia per Dr. Tolbert on 8/9/24.      Past Medical History  Past Medical History:   Diagnosis Date    Abnormal findings on diagnostic imaging of heart and coronary circulation 11/07/2019    Abnormal Heart Score CT    Abnormal levels of other serum enzymes 10/24/2019    Abnormal cardiac enzyme level    Allergic rhinitis due to animal hair or dander 10/28/2013    Anxiety 2023    Arthritis 2016    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/2014    Disease of thyroid gland 2018    Diverticulosis of colon 06/17/2013    Diverticulosis of intestine, part unspecified, without perforation or abscess without bleeding     Diverticulosis    Dizziness     positional    Gastric polyp 06/23/2014    GERD (gastroesophageal reflux disease) 2008    Glaucoma suspect, both eyes 01/08/2015    HL (hearing loss) 2022    Hx of squamous cell carcinoma of skin 06/09/2015    Hypertension 2015    Internal hemorrhoid 03/21/2016    Irregular heart beat     Low TSH level 03/10/2023    Lung nodule, solitary 03/10/2023    Lung nodule, solitary 03/10/2023    Stable CT 6/23  No follow up needed    Mild intermittent asthma without complication (HHS-HCC) 02/24/2016    Other microscopic hematuria 03/01/2018    Microscopic hematuria    Other specified abnormal findings of blood chemistry 10/19/2021    Elevated LFTs    Other specified abnormal findings of blood chemistry 10/19/2021    Elevated LFTs    Pain in right knee     Right knee pain    Personal history of other diseases of the digestive system     History of hiatal hernia    Personal history of other diseases of the musculoskeletal system and connective tissue  01/17/2020    History of osteopenia    Personal history of other diseases of the musculoskeletal system and connective tissue     History of herniated intervertebral disc    Personal history of other diseases of the musculoskeletal system and connective tissue     History of chronic back pain    Personal history of other diseases of the nervous system and sense organs     History of cataract    Personal history of other diseases of the nervous system and sense organs     History of glaucoma    Personal history of other diseases of the respiratory system     History of allergic rhinitis    Personal history of other endocrine, nutritional and metabolic disease     History of hyperlipidemia    Personal history of other endocrine, nutritional and metabolic disease     History of Graves' disease    Personal history of other endocrine, nutritional and metabolic disease     History of hypoglycemia    Personal history of other malignant neoplasm of skin     History of malignant neoplasm of skin    Personal history of other mental and behavioral disorders     History of anxiety    Presence of right artificial knee joint 09/21/2017    Scoliosis 2008    Spinal stenosis     Vision loss     Visual impairment 2008    Wears partial dentures     upper    Wrist sprain 03/10/2023       Surgical History  Past Surgical History:   Procedure Laterality Date    HIP ARTHROPLASTY      right    JOINT REPLACEMENT Bilateral 2017-18    knees    KNEE ARTHROSCOPY W/ DEBRIDEMENT  07/09/2016    Arthroscopy Knee    OTHER SURGICAL HISTORY  10/14/2019    Excision of squamous cell carcinoma        Social History  She reports that she has never smoked. She has never used smokeless tobacco. She reports that she does not currently use alcohol. She reports that she does not use drugs.    Family History  Family History   Problem Relation Name Age of Onset    Diabetes Mother Jaci Bingham         Allergies  House dust, Adhesive tape-silicones, Cat dander,  and Penicillins    Review of Systems   Constitutional: Negative.    HENT: Negative.          Hx cervical stenosis    Eyes: Negative.    Respiratory: Negative.     Cardiovascular:  Positive for leg swelling.        Patient states her toes will swell and take on a 'purple-donavon hue'    Gastrointestinal: Negative.    Endocrine: Negative.    Genitourinary: Negative.    Musculoskeletal:  Positive for arthralgias, gait problem and myalgias.   Skin: Negative.    Allergic/Immunologic: Negative.    Neurological:  Positive for weakness and numbness.        To bilateral lower legs.    Hematological: Negative.    Psychiatric/Behavioral: Negative.     All other systems reviewed and are negative.       Physical Exam  Vitals and nursing note reviewed.   Constitutional:       Appearance: Normal appearance.   HENT:      Head: Normocephalic.      Nose: Nose normal.      Mouth/Throat:      Comments:   Mallampati: 3  TMD: >3  Finger breadth: 3  Dentition:  upper denture (loose)  Neck ROM: limited, especially side to side.   Eyes:      Pupils: Pupils are equal, round, and reactive to light.   Cardiovascular:      Rate and Rhythm: Normal rate and regular rhythm.      Heart sounds: Normal heart sounds, S1 normal and S2 normal.      Comments: Jean-Claude lower leg edema. Left side > right. Left side is also tighter and firmer than the right on palpation. Soft diabetic hose on, non-compression. Discoloration present. Using wheelchair today.   Pulmonary:      Effort: Pulmonary effort is normal.      Breath sounds: Normal breath sounds.      Comments: Lungs clear throughout all fields.   Abdominal:      General: Bowel sounds are normal.      Palpations: Abdomen is soft.      Comments: Bowel sound active x4 quads    Musculoskeletal:         General: Normal range of motion.      Cervical back: Normal range of motion.      Right lower le+ Edema present.      Left lower le+ Edema present.   Skin:     General: Skin is warm and dry.   Neurological:  "     General: No focal deficit present.      Mental Status: She is alert and oriented to person, place, and time.   Psychiatric:         Mood and Affect: Mood normal.         Behavior: Behavior normal.         Thought Content: Thought content normal.         Judgment: Judgment normal.          Last Recorded Vitals  Blood pressure 130/64, pulse 91, temperature 36.9 °C (98.4 °F), temperature source Oral, resp. rate 20, height 1.651 m (5' 5\"), weight 73.9 kg (163 lb), SpO2 95%.      Current Outpatient Medications:     amLODIPine (Norvasc) 10 mg tablet, Take 1 tablet (10 mg) by mouth once daily., Disp: 90 tablet, Rfl: 3    aspirin 81 mg EC tablet, Take 1 tablet (81 mg) by mouth once daily., Disp: , Rfl:     atorvastatin (Lipitor) 40 mg tablet, Take 1 tablet (40 mg) by mouth once daily at bedtime. (Patient taking differently: Take 1 tablet (40 mg) by mouth once daily at bedtime. Takes at dinner), Disp: 90 tablet, Rfl: 3    busPIRone (Buspar) 15 mg tablet, Take 1 tablet (15 mg) by mouth 2 times a day., Disp: 180 tablet, Rfl: 1    calcium carbonate-vitamin D3 500 mg-3.125 mcg (125 unit) tablet tablet, Take by mouth., Disp: , Rfl:     cholecalciferol (Vitamin D3) 10 MCG (400 UNIT) tablet, Take 4 tablets (40 mcg) by mouth once daily., Disp: , Rfl:     cyanocobalamin (Vitamin B-12) 1,000 mcg tablet, Take 1 tablet (1,000 mcg) by mouth once daily., Disp: , Rfl:     diazePAM (Valium) 5 mg tablet, Take 1 tablet (5 mg) by mouth 1 time for 1 dose., Disp: 2 tablet, Rfl: 0    docusate sodium (Colace) 100 mg capsule, Take by mouth twice a day., Disp: , Rfl:     DULoxetine (Cymbalta) 30 mg DR capsule, Take 1 capsule (30 mg) by mouth once daily. Do not crush or chew.  Patient will take duloxetine 30 mg and 60 mg capsule daily for a total dose of 90 mg daily.   Please hold until fill date, Disp: 90 capsule, Rfl: 0    DULoxetine (Cymbalta) 60 mg DR capsule, Take 1 capsule (60 mg) by mouth once daily. Do not crush or chew.  Will take " duloxetine 60 mg and 30 mg capsule daily for a total dose of 90 mg daily.  Please hold until fill date, Disp: 90 capsule, Rfl: 0    esomeprazole (NexIUM) 20 mg DR capsule, Take 1 capsule (20 mg) by mouth once daily., Disp: , Rfl:     gabapentin (Neurontin) 100 mg capsule, Take 3 capsules (300 mg) by mouth 3 times a day., Disp: 810 capsule, Rfl: 3    lubiprostone (Amitiza) 8 mcg capsule, Take 1 capsule (8 mcg) by mouth 2 times daily (morning and late afternoon). Take with meals, Disp: 60 capsule, Rfl: 11    mirabegron (Myrbetriq) 50 mg tablet extended release 24 hr 24 hr tablet, Take 1 tablet (50 mg) by mouth once daily., Disp: 90 tablet, Rfl: 3    oxyCODONE (Roxicodone) 5 mg immediate release tablet, Take 1 tablet (5 mg) by mouth 2 times a day as needed for severe pain (7 - 10). Do not fill before July 19, 2024., Disp: 60 tablet, Rfl: 0    polyethylene glycol (Glycolax) 17 gram/dose powder, Take by mouth every other day., Disp: , Rfl:      Relevant Results  Results for orders placed or performed in visit on 08/05/24 (from the past 24 hour(s))   Basic Metabolic Panel   Result Value Ref Range    Glucose 115 (H) 74 - 99 mg/dL    Sodium 138 136 - 145 mmol/L    Potassium 4.3 3.5 - 5.3 mmol/L    Chloride 104 98 - 107 mmol/L    Bicarbonate 29 21 - 32 mmol/L    Anion Gap 9 (L) 10 - 20 mmol/L    Urea Nitrogen 15 6 - 23 mg/dL    Creatinine 0.72 0.50 - 1.05 mg/dL    eGFR 86 >60 mL/min/1.73m*2    Calcium 9.2 8.6 - 10.3 mg/dL   CBC   Result Value Ref Range    WBC 5.2 4.4 - 11.3 x10*3/uL    nRBC 0.0 0.0 - 0.0 /100 WBCs    RBC 4.17 4.00 - 5.20 x10*6/uL    Hemoglobin 13.3 12.0 - 16.0 g/dL    Hematocrit 40.1 36.0 - 46.0 %    MCV 96 80 - 100 fL    MCH 31.9 26.0 - 34.0 pg    MCHC 33.2 32.0 - 36.0 g/dL    RDW 14.0 11.5 - 14.5 %    Platelets 215 150 - 450 x10*3/uL               Assessment/Plan   Problem List Items Addressed This Visit       Benign essential hypertension    Relevant Orders    Basic Metabolic Panel (Completed)     Hashimoto's thyroiditis    Relevant Orders    Basic Metabolic Panel (Completed)    CBC (Completed)    RBBB (right bundle branch block with left anterior fascicular block)     Other Visit Diagnoses       Pre-op evaluation    -  Primary    Relevant Orders    Basic Metabolic Panel (Completed)    CBC (Completed)    May-Thurner syndrome        Relevant Orders    Basic Metabolic Panel (Completed)    CBC (Completed)            Severe lower leg edema. Scheduled for bilateral lower leg venograms with possible stent placement under MAC/general anesthesia per Dr. Tolbert on 8/9/24.   CBC, BMP ordered. Reviewed and WNL.   EKG 1/2024 - SR.   H&P completed today.   Patient states she is planning on taking her ASA, amlodipine, and gabapentin with a small sip of water the morning of surgery.   All surgery instructions reviewed with patient by RN. Verbalized understanding.        I spent 40 minutes in the professional and overall care of this patient.      Marcela Ayala, APRN-CNS

## 2024-08-05 NOTE — PREPROCEDURE INSTRUCTIONS
Medication List            Accurate as of August 5, 2024 10:40 AM. Always use your most recent med list.                amLODIPine 10 mg tablet  Commonly known as: Norvasc  Take 1 tablet (10 mg) by mouth once daily.     aspirin 81 mg EC tablet     atorvastatin 40 mg tablet  Commonly known as: Lipitor  Take 1 tablet (40 mg) by mouth once daily at bedtime.     busPIRone 15 mg tablet  Commonly known as: Buspar  Take 1 tablet (15 mg) by mouth 2 times a day.     calcium carbonate-vitamin D3 500 mg-3.125 mcg (125 unit) tablet tablet     cyanocobalamin 1,000 mcg tablet  Commonly known as: Vitamin B-12     diazePAM 5 mg tablet  Commonly known as: Valium  Take 1 tablet (5 mg) by mouth 1 time for 1 dose.     docusate sodium 100 mg capsule  Commonly known as: Colace     * DULoxetine 30 mg DR capsule  Commonly known as: Cymbalta  Take 1 capsule (30 mg) by mouth once daily. Do not crush or chew.  Patient will take duloxetine 30 mg and 60 mg capsule daily for a total dose of 90 mg daily.   Please hold until fill date     * DULoxetine 60 mg DR capsule  Commonly known as: Cymbalta  Take 1 capsule (60 mg) by mouth once daily. Do not crush or chew.  Will take duloxetine 60 mg and 30 mg capsule daily for a total dose of 90 mg daily.  Please hold until fill date     esomeprazole 20 mg DR capsule  Commonly known as: NexIUM     gabapentin 100 mg capsule  Commonly known as: Neurontin  Take 3 capsules (300 mg) by mouth 3 times a day.     lubiprostone 8 mcg capsule  Commonly known as: Amitiza  Take 1 capsule (8 mcg) by mouth 2 times daily (morning and late afternoon). Take with meals     mirabegron 50 mg tablet extended release 24 hr 24 hr tablet  Commonly known as: Myrbetriq  Take 1 tablet (50 mg) by mouth once daily.     oxyCODONE 5 mg immediate release tablet  Commonly known as: Roxicodone  Take 1 tablet (5 mg) by mouth 2 times a day as needed for severe pain (7 - 10). Do not fill before July 19, 2024.     polyethylene glycol 17  gram/dose powder  Commonly known as: Glycolax, Miralax     Vitamin D3 10 MCG (400 UNIT) tablet  Generic drug: cholecalciferol           * This list has 2 medication(s) that are the same as other medications prescribed for you. Read the directions carefully, and ask your doctor or other care provider to review them with you.                                  NPO Instructions:    Nothing to eat or drink after midnight  Morning of procedure take your aspirin, amlodipine and your gabapentin with a small sip of water  Hydrate well the day before your procedure      Additional Instructions:     Wear  comfortable loose fitting clothing  Do not use moisturizers, creams, lotions or perfume  All jewelry and valuables should be left at home  Wear sweat bottoms morning of procedure  Shower morning of procedure deodorant only  Please call [phone] with any questions   Morning of procedure bring ID and insurance card

## 2024-08-05 NOTE — CPM/PAT H&P
CPM/PAT Evaluation       Name: Robyn Madera (Robyn Madera)  /Age: 1945/79 y.o.     In-Person       Chief Complaint: Severe lower leg edema. Scheduled for bilateral lower leg venograms with possible stent placement under MAC/general anesthesia per Dr. Tolbert on 24.       Past Medical History:   Diagnosis Date    Abnormal findings on diagnostic imaging of heart and coronary circulation 2019    Abnormal Heart Score CT    Abnormal levels of other serum enzymes 10/24/2019    Abnormal cardiac enzyme level    Allergic rhinitis due to animal hair or dander 10/28/2013    Anxiety     Arthritis 2016    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/2014    Disease of thyroid gland 2018    Diverticulosis of colon 2013    Diverticulosis of intestine, part unspecified, without perforation or abscess without bleeding     Diverticulosis    Dizziness     positional    Gastric polyp 2014    GERD (gastroesophageal reflux disease)     Glaucoma suspect, both eyes 2015    HL (hearing loss)     Hx of squamous cell carcinoma of skin 2015    Hypertension 2015    Internal hemorrhoid 2016    Irregular heart beat     Low TSH level 03/10/2023    Lung nodule, solitary 03/10/2023    Lung nodule, solitary 03/10/2023    Stable CT   No follow up needed    Mild intermittent asthma without complication (HHS-HCC) 2016    Other microscopic hematuria 2018    Microscopic hematuria    Other specified abnormal findings of blood chemistry 10/19/2021    Elevated LFTs    Other specified abnormal findings of blood chemistry 10/19/2021    Elevated LFTs    Pain in right knee     Right knee pain    Personal history of other diseases of the digestive system     History of hiatal hernia    Personal history of other diseases of the musculoskeletal system and connective tissue 2020    History of osteopenia    Personal history of other diseases of the musculoskeletal system  and connective tissue     History of herniated intervertebral disc    Personal history of other diseases of the musculoskeletal system and connective tissue     History of chronic back pain    Personal history of other diseases of the nervous system and sense organs     History of cataract    Personal history of other diseases of the nervous system and sense organs     History of glaucoma    Personal history of other diseases of the respiratory system     History of allergic rhinitis    Personal history of other endocrine, nutritional and metabolic disease     History of hyperlipidemia    Personal history of other endocrine, nutritional and metabolic disease     History of Graves' disease    Personal history of other endocrine, nutritional and metabolic disease     History of hypoglycemia    Personal history of other malignant neoplasm of skin     History of malignant neoplasm of skin    Personal history of other mental and behavioral disorders     History of anxiety    Presence of right artificial knee joint 09/21/2017    Scoliosis 2008    Spinal stenosis     Vision loss     Visual impairment 2008    Wears partial dentures     upper    Wrist sprain 03/10/2023       Past Surgical History:   Procedure Laterality Date    HIP ARTHROPLASTY      right    JOINT REPLACEMENT Bilateral 2017-18    knees    KNEE ARTHROSCOPY W/ DEBRIDEMENT  07/09/2016    Arthroscopy Knee    OTHER SURGICAL HISTORY  10/14/2019    Excision of squamous cell carcinoma       Patient  has no history on file for sexual activity.    Family History   Problem Relation Name Age of Onset    Diabetes Mother Jaci Bingham        Allergies   Allergen Reactions    House Dust Hives    Adhesive Tape-Silicones Other     ADHESIVES CAUSES BLISTERS    Cat Dander Unknown    Penicillins Hives       Prior to Admission medications    Medication Sig Start Date End Date Taking? Authorizing Provider   amLODIPine (Norvasc) 10 mg tablet Take 1 tablet (10 mg) by mouth once  daily. 8/21/23 8/20/24  Reanna Martinez MD   aspirin 81 mg EC tablet Take 1 tablet (81 mg) by mouth once daily.    Historical Provider, MD   atorvastatin (Lipitor) 40 mg tablet Take 1 tablet (40 mg) by mouth once daily at bedtime.  Patient taking differently: Take 1 tablet (40 mg) by mouth once daily at bedtime. Takes at dinner 8/21/23 8/20/24  Reanna Martinez MD   busPIRone (Buspar) 15 mg tablet Take 1 tablet (15 mg) by mouth 2 times a day. 6/10/24 12/7/24  Reanna Martinez MD   calcium carbonate-vitamin D3 500 mg-3.125 mcg (125 unit) tablet tablet Take by mouth.    Historical Provider, MD   cholecalciferol (Vitamin D3) 10 MCG (400 UNIT) tablet Take 4 tablets (40 mcg) by mouth once daily.    Historical Provider, MD   cyanocobalamin (Vitamin B-12) 1,000 mcg tablet Take 1 tablet (1,000 mcg) by mouth once daily.    Historical Provider, MD   diazePAM (Valium) 5 mg tablet Take 1 tablet (5 mg) by mouth 1 time for 1 dose. 6/10/24 6/10/24  Reanna Martinez MD   docusate sodium (Colace) 100 mg capsule Take by mouth twice a day.    Historical Provider, MD   DULoxetine (Cymbalta) 30 mg DR capsule Take 1 capsule (30 mg) by mouth once daily. Do not crush or chew.  Patient will take duloxetine 30 mg and 60 mg capsule daily for a total dose of 90 mg daily.   Please hold until fill date 6/21/24 9/19/24  ELENI Cody-CNP, APRN-CNS   DULoxetine (Cymbalta) 60 mg DR capsule Take 1 capsule (60 mg) by mouth once daily. Do not crush or chew.  Will take duloxetine 60 mg and 30 mg capsule daily for a total dose of 90 mg daily.  Please hold until fill date 6/21/24 9/19/24  BERNARDO Cody, APRN-CNS   esomeprazole (NexIUM) 20 mg DR capsule Take 1 capsule (20 mg) by mouth once daily.    Historical Provider, MD   gabapentin (Neurontin) 100 mg capsule Take 3 capsules (300 mg) by mouth 3 times a day. 12/11/23 12/10/24  Cooper Merchant MD   lubiprostone (Amitiza) 8 mcg capsule Take 1 capsule (8 mcg) by mouth 2  times daily (morning and late afternoon). Take with meals 6/19/24 6/19/25  Ismael Pepper,    mirabegron (Myrbetriq) 50 mg tablet extended release 24 hr 24 hr tablet Take 1 tablet (50 mg) by mouth once daily. 2/27/24 2/21/25  Gino Haas MD   oxyCODONE (Roxicodone) 5 mg immediate release tablet Take 1 tablet (5 mg) by mouth 2 times a day as needed for severe pain (7 - 10). Do not fill before July 19, 2024. 7/19/24 8/18/24  Vibha Bueno, APRN-CNP, APRN-CNS   polyethylene glycol (Glycolax) 17 gram/dose powder Take by mouth every other day. 2/28/20   Historical Provider, MD          Visit Vitals  /64   Pulse 91   Temp 36.9 °C (98.4 °F) (Oral)   Resp 20       DASI Risk Score    No data to display       Caprini DVT Assessment      Flowsheet Row Most Recent Value   DVT Score 10   Current Status Major surgery planned, lasting 2-3 hours, Swollen legs, Varicose veins   History Prior major surgery   Age Over 75 years   BMI 30 or less          Modified Frailty Index    No data to display       CHADS2 Stroke Risk  Current as of 27 minutes ago        N/A 3 to 100%: High Risk   2 to < 3%: Medium Risk   0 to < 2%: Low Risk     Last Change: N/A          This score determines the patient's risk of having a stroke if the patient has atrial fibrillation.        This score is not applicable to this patient. Components are not calculated.          Revised Cardiac Risk Index      Flowsheet Row Most Recent Value   Revised Cardiac Risk Calculator 1          Apfel Simplified Score      Flowsheet Row Most Recent Value   Apfel Simplified Score Calculator 3          Risk Analysis Index Results This Encounter    No data found in the last 1 encounters.       Stop Bang Score      Flowsheet Row Most Recent Value   Do you snore loudly? 0   Do you often feel tired or fatigued after your sleep? 0   Has anyone ever observed you stop breathing in your sleep? 0   Do you have or are you being treated for high blood pressure? 1   Recent  BMI (Calculated) 26.8   Is BMI greater than 35 kg/m2? 0=No   Age older than 50 years old? 1=Yes   Is your neck circumference greater than 17 inches (Male) or 16 inches (Female)? 0   Gender - Male 0=No   STOP-BANG Total Score 2            Assessment and Plan:     Cardiovascular: Severe lower leg edema. Scheduled for bilateral lower leg venograms with possible stent placement under MAC/general anesthesia per Dr. Tolbert on 8/9/24.   See H&P.

## 2024-08-08 ENCOUNTER — APPOINTMENT (OUTPATIENT)
Dept: VASCULAR SURGERY | Facility: CLINIC | Age: 79
End: 2024-08-08
Payer: MEDICARE

## 2024-08-09 ENCOUNTER — HOSPITAL ENCOUNTER (OUTPATIENT)
Dept: CARDIOLOGY | Facility: HOSPITAL | Age: 79
Discharge: HOME | End: 2024-08-09
Payer: MEDICARE

## 2024-08-09 ENCOUNTER — HOSPITAL ENCOUNTER (OUTPATIENT)
Facility: HOSPITAL | Age: 79
Setting detail: OUTPATIENT SURGERY
Discharge: HOME | End: 2024-08-09
Attending: SURGERY | Admitting: SURGERY
Payer: MEDICARE

## 2024-08-09 ENCOUNTER — PHARMACY VISIT (OUTPATIENT)
Dept: PHARMACY | Facility: CLINIC | Age: 79
End: 2024-08-09
Payer: MEDICARE

## 2024-08-09 ENCOUNTER — APPOINTMENT (OUTPATIENT)
Dept: RADIOLOGY | Facility: HOSPITAL | Age: 79
End: 2024-08-09
Payer: MEDICARE

## 2024-08-09 ENCOUNTER — ANESTHESIA (OUTPATIENT)
Dept: OPERATING ROOM | Facility: HOSPITAL | Age: 79
End: 2024-08-09
Payer: MEDICARE

## 2024-08-09 VITALS
HEART RATE: 88 BPM | OXYGEN SATURATION: 97 % | TEMPERATURE: 97.7 F | HEIGHT: 65 IN | SYSTOLIC BLOOD PRESSURE: 138 MMHG | RESPIRATION RATE: 15 BRPM | WEIGHT: 161 LBS | BODY MASS INDEX: 26.82 KG/M2 | DIASTOLIC BLOOD PRESSURE: 70 MMHG

## 2024-08-09 DIAGNOSIS — I87.1 MAY-THURNER SYNDROME: ICD-10-CM

## 2024-08-09 DIAGNOSIS — R60.0 LEG EDEMA, RIGHT: Primary | ICD-10-CM

## 2024-08-09 PROBLEM — K21.9 GASTROESOPHAGEAL REFLUX DISEASE: Status: ACTIVE | Noted: 2024-08-09

## 2024-08-09 LAB
ATRIAL RATE: 93 BPM
P AXIS: 47 DEGREES
PR INTERVAL: 212 MS
Q ONSET: 251 MS
QRS COUNT: 14 BEATS
QRS DURATION: 150 MS
QT INTERVAL: 429 MS
QTC CALCULATION(BAZETT): 523 MS
QTC FREDERICIA: 489 MS
R AXIS: 60 DEGREES
T AXIS: 2 DEGREES
T OFFSET: 466 MS
VENTRICULAR RATE: 89 BPM

## 2024-08-09 PROCEDURE — 2500000004 HC RX 250 GENERAL PHARMACY W/ HCPCS (ALT 636 FOR OP/ED): Performed by: NURSE ANESTHETIST, CERTIFIED REGISTERED

## 2024-08-09 PROCEDURE — 3700000001 HC GENERAL ANESTHESIA TIME - INITIAL BASE CHARGE: Performed by: SURGERY

## 2024-08-09 PROCEDURE — 93005 ELECTROCARDIOGRAM TRACING: CPT

## 2024-08-09 PROCEDURE — C1753 CATH, INTRAVAS ULTRASOUND: HCPCS | Performed by: SURGERY

## 2024-08-09 PROCEDURE — 2500000001 HC RX 250 WO HCPCS SELF ADMINISTERED DRUGS (ALT 637 FOR MEDICARE OP): Performed by: SURGERY

## 2024-08-09 PROCEDURE — C1725 CATH, TRANSLUMIN NON-LASER: HCPCS | Performed by: SURGERY

## 2024-08-09 PROCEDURE — 93010 ELECTROCARDIOGRAM REPORT: CPT | Performed by: INTERNAL MEDICINE

## 2024-08-09 PROCEDURE — RXMED WILLOW AMBULATORY MEDICATION CHARGE

## 2024-08-09 PROCEDURE — 2500000004 HC RX 250 GENERAL PHARMACY W/ HCPCS (ALT 636 FOR OP/ED): Performed by: ANESTHESIOLOGY

## 2024-08-09 PROCEDURE — 2500000005 HC RX 250 GENERAL PHARMACY W/O HCPCS: Performed by: NURSE ANESTHETIST, CERTIFIED REGISTERED

## 2024-08-09 PROCEDURE — 2500000004 HC RX 250 GENERAL PHARMACY W/ HCPCS (ALT 636 FOR OP/ED): Mod: JZ | Performed by: SURGERY

## 2024-08-09 PROCEDURE — 3600000009 HC OR TIME - EACH INCREMENTAL 1 MINUTE - PROCEDURE LEVEL FOUR: Performed by: SURGERY

## 2024-08-09 PROCEDURE — 7100000001 HC RECOVERY ROOM TIME - INITIAL BASE CHARGE: Performed by: SURGERY

## 2024-08-09 PROCEDURE — 7100000010 HC PHASE TWO TIME - EACH INCREMENTAL 1 MINUTE: Performed by: SURGERY

## 2024-08-09 PROCEDURE — 2780000003 HC OR 278 NO HCPCS: Performed by: SURGERY

## 2024-08-09 PROCEDURE — 3600000004 HC OR TIME - INITIAL BASE CHARGE - PROCEDURE LEVEL FOUR: Performed by: SURGERY

## 2024-08-09 PROCEDURE — C1876 STENT, NON-COA/NON-COV W/DEL: HCPCS | Performed by: SURGERY

## 2024-08-09 PROCEDURE — 3700000002 HC GENERAL ANESTHESIA TIME - EACH INCREMENTAL 1 MINUTE: Performed by: SURGERY

## 2024-08-09 PROCEDURE — 2500000001 HC RX 250 WO HCPCS SELF ADMINISTERED DRUGS (ALT 637 FOR MEDICARE OP): Performed by: ANESTHESIOLOGY

## 2024-08-09 PROCEDURE — C1769 GUIDE WIRE: HCPCS | Performed by: SURGERY

## 2024-08-09 PROCEDURE — 2500000004 HC RX 250 GENERAL PHARMACY W/ HCPCS (ALT 636 FOR OP/ED): Performed by: SURGERY

## 2024-08-09 PROCEDURE — 7100000009 HC PHASE TWO TIME - INITIAL BASE CHARGE: Performed by: SURGERY

## 2024-08-09 PROCEDURE — 7100000002 HC RECOVERY ROOM TIME - EACH INCREMENTAL 1 MINUTE: Performed by: SURGERY

## 2024-08-09 PROCEDURE — 2720000007 HC OR 272 NO HCPCS: Performed by: SURGERY

## 2024-08-09 PROCEDURE — 76000 FLUOROSCOPY <1 HR PHYS/QHP: CPT

## 2024-08-09 PROCEDURE — 2550000001 HC RX 255 CONTRASTS: Performed by: SURGERY

## 2024-08-09 DEVICE — STENT AB9U16120090 ABRE V01
Type: IMPLANTABLE DEVICE | Site: VEIN | Status: FUNCTIONAL
Brand: ABRE™

## 2024-08-09 RX ORDER — ONDANSETRON HYDROCHLORIDE 2 MG/ML
4 INJECTION, SOLUTION INTRAVENOUS ONCE AS NEEDED
Status: DISCONTINUED | OUTPATIENT
Start: 2024-08-09 | End: 2024-08-09 | Stop reason: HOSPADM

## 2024-08-09 RX ORDER — CLOPIDOGREL BISULFATE 300 MG/1
300 TABLET, FILM COATED ORAL DAILY
Status: DISCONTINUED | OUTPATIENT
Start: 2024-08-09 | End: 2024-08-09 | Stop reason: HOSPADM

## 2024-08-09 RX ORDER — SODIUM CHLORIDE 9 MG/ML
50 INJECTION, SOLUTION INTRAVENOUS CONTINUOUS
Status: DISCONTINUED | OUTPATIENT
Start: 2024-08-09 | End: 2024-08-09 | Stop reason: HOSPADM

## 2024-08-09 RX ORDER — METOCLOPRAMIDE HYDROCHLORIDE 5 MG/ML
5 INJECTION INTRAMUSCULAR; INTRAVENOUS ONCE
Status: COMPLETED | OUTPATIENT
Start: 2024-08-09 | End: 2024-08-09

## 2024-08-09 RX ORDER — ONDANSETRON HYDROCHLORIDE 2 MG/ML
4 INJECTION, SOLUTION INTRAVENOUS ONCE
Status: COMPLETED | OUTPATIENT
Start: 2024-08-09 | End: 2024-08-09

## 2024-08-09 RX ORDER — HEPARIN SODIUM 5000 [USP'U]/ML
INJECTION, SOLUTION INTRAVENOUS; SUBCUTANEOUS AS NEEDED
Status: DISCONTINUED | OUTPATIENT
Start: 2024-08-09 | End: 2024-08-09

## 2024-08-09 RX ORDER — LIDOCAINE HCL/PF 100 MG/5ML
SYRINGE (ML) INTRAVENOUS AS NEEDED
Status: DISCONTINUED | OUTPATIENT
Start: 2024-08-09 | End: 2024-08-09

## 2024-08-09 RX ORDER — ALBUTEROL SULFATE 0.83 MG/ML
2.5 SOLUTION RESPIRATORY (INHALATION) ONCE
Status: DISCONTINUED | OUTPATIENT
Start: 2024-08-09 | End: 2024-08-09 | Stop reason: HOSPADM

## 2024-08-09 RX ORDER — IODIXANOL 270 MG/ML
INJECTION, SOLUTION INTRAVASCULAR AS NEEDED
Status: DISCONTINUED | OUTPATIENT
Start: 2024-08-09 | End: 2024-08-09 | Stop reason: HOSPADM

## 2024-08-09 RX ORDER — MORPHINE SULFATE 2 MG/ML
2 INJECTION, SOLUTION INTRAMUSCULAR; INTRAVENOUS EVERY 5 MIN PRN
Status: DISCONTINUED | OUTPATIENT
Start: 2024-08-09 | End: 2024-08-09 | Stop reason: HOSPADM

## 2024-08-09 RX ORDER — ACETAMINOPHEN 325 MG/1
975 TABLET ORAL ONCE
Status: COMPLETED | OUTPATIENT
Start: 2024-08-09 | End: 2024-08-09

## 2024-08-09 RX ORDER — MORPHINE SULFATE 4 MG/ML
4 INJECTION INTRAVENOUS EVERY 5 MIN PRN
Status: DISCONTINUED | OUTPATIENT
Start: 2024-08-09 | End: 2024-08-09 | Stop reason: HOSPADM

## 2024-08-09 RX ORDER — FENTANYL CITRATE 50 UG/ML
INJECTION, SOLUTION INTRAMUSCULAR; INTRAVENOUS AS NEEDED
Status: DISCONTINUED | OUTPATIENT
Start: 2024-08-09 | End: 2024-08-09

## 2024-08-09 RX ORDER — CLOPIDOGREL BISULFATE 75 MG/1
75 TABLET ORAL DAILY
Qty: 30 TABLET | Refills: 1 | Status: SHIPPED | OUTPATIENT
Start: 2024-08-09

## 2024-08-09 RX ORDER — PROPOFOL 10 MG/ML
INJECTION, EMULSION INTRAVENOUS CONTINUOUS PRN
Status: DISCONTINUED | OUTPATIENT
Start: 2024-08-09 | End: 2024-08-09

## 2024-08-09 RX ORDER — FAMOTIDINE 10 MG/ML
20 INJECTION INTRAVENOUS ONCE
Status: COMPLETED | OUTPATIENT
Start: 2024-08-09 | End: 2024-08-09

## 2024-08-09 RX ORDER — CLINDAMYCIN PHOSPHATE 600 MG/50ML
600 INJECTION, SOLUTION INTRAVENOUS ONCE
Status: COMPLETED | OUTPATIENT
Start: 2024-08-09 | End: 2024-08-09

## 2024-08-09 RX ORDER — SODIUM CITRATE AND CITRIC ACID MONOHYDRATE 334; 500 MG/5ML; MG/5ML
30 SOLUTION ORAL ONCE
Status: COMPLETED | OUTPATIENT
Start: 2024-08-09 | End: 2024-08-09

## 2024-08-09 SDOH — HEALTH STABILITY: MENTAL HEALTH: CURRENT SMOKER: 0

## 2024-08-09 ASSESSMENT — PAIN SCALES - GENERAL
PAINLEVEL_OUTOF10: 3
PAINLEVEL_OUTOF10: 3
PAINLEVEL_OUTOF10: 5 - MODERATE PAIN
PAINLEVEL_OUTOF10: 5 - MODERATE PAIN
PAIN_LEVEL: 3

## 2024-08-09 ASSESSMENT — PAIN DESCRIPTION - LOCATION: LOCATION: BACK

## 2024-08-09 NOTE — INTERVAL H&P NOTE
H&P reviewed. The patient was examined and there are no changes to the H&P.  Physical exam    Constitutional: alert and in no acute distress verbal  Eyes: No erythema swelling or discharge noted  Neck: supple, symmetric, trachea midline, no masses noted  Cardiovascular: Carotid pulses 2+, no obvious bruit, no Jugular distension noted, no thrill, heart regular rate, lower extremity vascular exam intact, cap refill <2 sec  Pulmonary:  Bilateral breath sounds intact, clear with rales rhonchi or wheeze  Abdomen: soft non tender, no pulsatile masses noted, no rebound rigidity or guarding noted  Skin: intact warm no abnormal turgor  Psychiatric: alert without any obvious cognitive issues, oriented to person, place, and time

## 2024-08-09 NOTE — ANESTHESIA POSTPROCEDURE EVALUATION
Patient: Robyn Madera    Procedure Summary       Date: 08/09/24 Room / Location: [facility] 08 / Virtual [facility]    Anesthesia Start: 0729 Anesthesia Stop: 0813    Procedure: BILATERAL LOWER VENOGRAM WITH INTRAVASCULAR ULTRASOUND, Right iliac vein STENT *C-ARM* LORENZO WITH BYERS ( IVUS)  TESSIE WITH MEDTRONIC (Bilateral) Diagnosis:       May-Thurner syndrome      (I87.1)    Surgeons: Jagjit Tolbert DO Responsible Provider: JARED Avila    Anesthesia Type: MAC ASA Status: 2            Anesthesia Type: MAC    Vitals Value Taken Time   /70 08/09/24 1010   Temp 36.5 °C (97.7 °F) 08/09/24 0810   Pulse 90 08/09/24 1014   Resp 31 08/09/24 1014   SpO2 92 % 08/09/24 1014   Vitals shown include unfiled device data.    Anesthesia Post Evaluation    Patient location during evaluation: PACU  Patient participation: complete - patient participated  Level of consciousness: awake and alert  Pain score: 3  Pain management: satisfactory to patient  Airway patency: patent  Cardiovascular status: hemodynamically stable  Respiratory status: room air  Hydration status: acceptable  Postoperative Nausea and Vomiting: none    There were no known notable events for this encounter.

## 2024-08-09 NOTE — OP NOTE
BILATERAL LOWER VENOGRAM WITH INTRAVASCULAR ULTRASOUND, Right iliac vein STENT *C-ARM* LORENZO WITH BYERS ( IVUS)  KURTZ WITH MEDTRONIC (B) Operative Note     Date: 2024  OR Location: [facility]    Name: Robyn Madera, : 1945, Age: 79 y.o., MRN: [id number], Sex: female    Diagnosis  Pre-op Diagnosis      * May-Thurner syndrome [I87.1] Post-op Diagnosis     * May-Thurner syndrome [I87.1]     Procedures  BILATERAL LOWER VENOGRAM WITH INTRAVASCULAR ULTRASOUND, Right iliac vein STENT *C-ARM* LORENZO WITH BYERS ( IVUS)  KURTZ WITH MEDTRONIC  05947 - [place] SLCTV CATH PLMT VANDA SYS 1ST ORDER BRANCH    BILATERAL LOWER VENOGRAM WITH INTRAVASCULAR ULTRASOUND, Right iliac vein STENT *C-ARM* LORENZO WITH BYERS ( IVUS)  KURTZ WITH MEDTRONIC  42914 - [place] INTRAVASCULAR US NONCORONARY RS&I INTIAL VESSEL    BILATERAL LOWER VENOGRAM WITH INTRAVASCULAR ULTRASOUND, Right iliac vein STENT *C-ARM* LORENZO WITH BYERS ( IVUS)  KURTZ WITH MEDTRONIC  47659 - CHG VENOGRAPHY EXTREMITY BILATERAL RS&I    BILATERAL LOWER VENOGRAM WITH INTRAVASCULAR ULTRASOUND, Right iliac vein STENT *C-ARM* LORENZO WITH BYERS ( IVUS)  KURTZ WITH MEDTRONIC  85542 - [place] INTRAVASCULAR US NONCORONARY RS&I ADDL VESSEL      Surgeons      * Jagjit Tolbert - Primary    Resident/Fellow/Other Assistant:  Surgeons and Role:  * No surgeons found with a matching role *    Procedure Summary  Anesthesia: Monitor Anesthesia Care  ASA: II  Anesthesia Staff: CRNA: ELENI Avila-CRNA  Estimated Blood Loss: 10mL  Intra-op Medications:   Administrations occurring from 0730 to 0900 on 24:   Medication Name Total Dose   iodixanol (VISIPaque) 270 mg iodine/mL injection 20 mL   heparin 5,000 Units in sodium chloride 0.9 % 500 mL irrigation 500 mL   sodium chloride 0.9% infusion Cannot be calculated   clindamycin (Cleocin) 600 mg in dextrose 5% IV 50 mL Cannot be calculated              Anesthesia Record               Intraprocedure I/O Totals          Intake     Propofol Drip 0.00 mL    The total shown is the total volume documented since Anesthesia Start was filed.    sodium chloride 0.9% infusion 600.00 mL    clindamycin (Cleocin) 600 mg in dextrose 5% IV 50 mL 50.00 mL    Total Intake 650 mL          Specimen: No specimens collected     Staff:   Circulator: Evelyn Florentinoub Person: Batsheva         Drains and/or Catheters: * None in log *    Tourniquet Times:         Implants:  Implants       Type Name Action Serial No.      Stent STENT, PEDRO VENOUS, [id number] - [id number] Implanted               Findings: right iliac vein compression    Indications: Robyn Madera is an 79 y.o. female who is having surgery for I87.1. Bilateral lower extremity edema    The patient was seen in the preoperative area. The risks, benefits, complications, treatment options, non-operative alternatives, expected recovery and outcomes were discussed with the patient. The possibilities of reaction to medication, pulmonary aspiration, injury to surrounding structures, bleeding, recurrent infection, the need for additional procedures, failure to diagnose a condition, and creating a complication requiring transfusion or operation were discussed with the patient. The patient concurred with the proposed plan, giving informed consent.  The site of surgery was properly noted/marked if necessary per policy. The patient has been actively warmed in preoperative area. Preoperative antibiotics have been ordered and given within 1 hours of incision. Venous thrombosis prophylaxis are not indicated.    Procedure Details: Patient was brought to the OR suite placed in the supine position administered monitored anesthesia care both groins are sterilely prepped and draped standard fashion.  Ultrasound guidance was utilized to access the left femoral vein advancing wire to the vena cava with no difficulty sheath was advanced contrast venography was performed demonstrating no evidence of intraluminal filling defect in the  vena cava or iliac vein.  IVUS device was subsequently advanced and withdrawn inferior vena cava surface reference was 374 mm², left common iliac vein surface reference was 207 mm²     left external  iliac surface reference was 223 mm²,  left common femoral vein surface reference was 120 mm².  Ultrasound guidance was utilized to access the right femoral vein Bentson wire to the vena cava with no difficulty sheath was advanced contrast venography was performed demonstrated no evidence of intraluminal filling defect in the iliac circuit.  The right common iliac surface reference was 182 mm², right extrailiac vein surface reference was 182 mm², the right common femoral vein surface reference was 83 mm².   There is an area of compression right common iliac artery of 70 mm² 60% difference.  Patient was systemically heparinized I readvanced the IVUS on the right confirming the area of compression at the right common iliac vein.  A 16 x 120 Abre  stent was deployed postdilated with 16 x 40 [place] balloon.  Complete resolution of the compression is noted at 181 mm².  At the termination procedure all catheter wires were removed manual compression was applied excellent hemostasis maintained patient will consent the PACU in stable condition.      Complications:  None; patient tolerated the procedure well.    Disposition: PACU - hemodynamically stable.  Condition: stable         Additional Details:      Attending Attestation: I was present and scrubbed for the entire procedure.    Jagjit Tolbert  Phone Number: [phone]

## 2024-08-09 NOTE — ANESTHESIA PREPROCEDURE EVALUATION
Patient: Robyn Madera    Procedure Information       Date/Time: 08/09/24 0730    Procedure: BILATERAL LOWER VENOGRAM WITH INTRAVASCULAR ULTRASOUND, POSSIBLE STENT *C-ARM* LORENZO WITH BYERS ( IVUS)  TESSIE WITH MEDTRONIC (Bilateral) - 60 MINUTES PROCEDURE TIME    Location: [facility] / [facility]    Surgeons: Jagjit Tolbert, DO            Relevant Problems   Anesthesia (within normal limits)      Cardiac   (+) Benign essential hypertension   (+) Mixed hyperlipidemia   (+) PVC's (premature ventricular contractions)   (+) RBBB (right bundle branch block with left anterior fascicular block)      Neuro   (+) Chronic lumbar radiculopathy   (+) Generalized anxiety disorder      GI   (+) Gastroesophageal reflux disease   (+) Hiatal hernia      Endocrine   (+) Multiple thyroid nodules   (+) Subclinical hyperthyroidism      Musculoskeletal   (+) Lumbar scoliosis   (+) Lumbosacral spondylosis   (+) Primary osteoarthritis of left knee   (+) Primary osteoarthritis of right hip   (+) Spinal stenosis of lumbar region      HEENT   (+) Bilateral hearing loss       Clinical information reviewed:    Allergies  Meds  Problems  Med Hx             NPO Detail:  NPO/Void Status  Date of Last Liquid: 08/09/24  Time of Last Liquid: 0430  Date of Last Solid: 08/08/24  Time of Last Solid: 2230         Physical Exam    Airway  Mallampati: II  TM distance: >3 FB     Cardiovascular   Rhythm: regular  Rate: normal     Dental   (+) upper dentures     Pulmonary - normal exam     Abdominal            Anesthesia Plan    History of general anesthesia?: yes  History of complications of general anesthesia?: no    ASA 2     MAC     The patient is not a current smoker.  Patient was not previously instructed to abstain from smoking on day of procedure.  Patient did not smoke on day of procedure.    intravenous induction   Anesthetic plan and risks discussed with patient.  Use of blood products discussed with patient who consented to blood  products.    Plan discussed with CRNA.

## 2024-08-12 ENCOUNTER — TELEPHONE (OUTPATIENT)
Dept: VASCULAR SURGERY | Facility: HOSPITAL | Age: 79
End: 2024-08-12
Payer: MEDICARE

## 2024-08-12 NOTE — TELEPHONE ENCOUNTER
Patient noticed a faint rash all over her body. The top of her feet are more red. She is concerned with issues with nexium and Plavix as she read the Plavix insert. She states that it's not an all over itch. I asked if she had tried benadryl or anything and she hasn't.

## 2024-08-16 DIAGNOSIS — M48.061 SPINAL STENOSIS OF LUMBAR REGION, UNSPECIFIED WHETHER NEUROGENIC CLAUDICATION PRESENT: ICD-10-CM

## 2024-08-16 DIAGNOSIS — M54.16 LUMBAR RADICULOPATHY: ICD-10-CM

## 2024-08-16 DIAGNOSIS — M47.816 LUMBAR SPONDYLOSIS: ICD-10-CM

## 2024-08-16 RX ORDER — OXYCODONE HYDROCHLORIDE 5 MG/1
5 TABLET ORAL 2 TIMES DAILY PRN
Qty: 60 TABLET | Refills: 0 | Status: SHIPPED | OUTPATIENT
Start: 2024-08-18 | End: 2024-09-17

## 2024-08-16 NOTE — TELEPHONE ENCOUNTER
Requested refill for Oxycodone 5mg BID #60 for 30 days.   LV: 6/13/24  NV: 9/16/24  OARRS verified LFD: 7/19/24  Last SD: 7/19/24  Next SD: 8/18/24  [facility]

## 2024-08-19 ENCOUNTER — TELEPHONE (OUTPATIENT)
Dept: ENDOCRINOLOGY | Facility: CLINIC | Age: 79
End: 2024-08-19
Payer: MEDICARE

## 2024-08-19 DIAGNOSIS — E05.90 SUBCLINICAL HYPERTHYROIDISM: Primary | ICD-10-CM

## 2024-08-19 DIAGNOSIS — E53.8 B12 DEFICIENCY: ICD-10-CM

## 2024-08-19 NOTE — TELEPHONE ENCOUNTER
Patient called in asking if you  can place order for pt to have her blood work checked prior to ov on 09/16/24 and pt is asking to have vitamin b 12 added due to fatigue.

## 2024-08-20 ENCOUNTER — APPOINTMENT (OUTPATIENT)
Dept: UROLOGY | Facility: CLINIC | Age: 79
End: 2024-08-20
Payer: MEDICARE

## 2024-08-21 DIAGNOSIS — I10 BENIGN ESSENTIAL HYPERTENSION: ICD-10-CM

## 2024-08-21 PROBLEM — S80.01XA CONTUSION OF RIGHT KNEE: Status: ACTIVE | Noted: 2024-08-21

## 2024-08-22 ENCOUNTER — APPOINTMENT (OUTPATIENT)
Dept: VASCULAR SURGERY | Facility: CLINIC | Age: 79
End: 2024-08-22
Payer: MEDICARE

## 2024-08-22 VITALS
WEIGHT: 160 LBS | DIASTOLIC BLOOD PRESSURE: 81 MMHG | BODY MASS INDEX: 26.63 KG/M2 | SYSTOLIC BLOOD PRESSURE: 173 MMHG | HEART RATE: 150 BPM

## 2024-08-22 DIAGNOSIS — I87.1 MAY-THURNER SYNDROME: ICD-10-CM

## 2024-08-22 DIAGNOSIS — I87.2 VENOUS INSUFFICIENCY: ICD-10-CM

## 2024-08-22 PROCEDURE — 99212 OFFICE O/P EST SF 10 MIN: CPT | Performed by: SURGERY

## 2024-08-22 RX ORDER — AMLODIPINE BESYLATE 10 MG/1
10 TABLET ORAL DAILY
Qty: 90 TABLET | Refills: 3 | Status: SHIPPED | OUTPATIENT
Start: 2024-08-22 | End: 2025-08-22

## 2024-08-22 NOTE — PROGRESS NOTES
Subjective   Patient ID: Robyn Madera is a 79 y.o. female who presents for Follow-up (S/p b/l venogram ).  HPI  Patient is overall doing well  Swelling slight improved right lower extremity left side is still stable  No active ulcer sores noted  No sign of pain or discomfort noted    Review of Systems    Objective   Physical Exam  Bilateral puncture sites clean dry and intact  Vascular exam intact    Assessment/Plan   None thrombotic compression right iliac vein  Status post stent  IVC duplex in 4 to 6 weeks  Follow-up in 3 months       Jagjit Tolbert DO 08/22/24 1:20 PM

## 2024-08-26 DIAGNOSIS — R93.1 AGATSTON CAC SCORE, >400: Primary | ICD-10-CM

## 2024-08-27 ENCOUNTER — LAB (OUTPATIENT)
Dept: LAB | Facility: LAB | Age: 79
End: 2024-08-27
Payer: MEDICARE

## 2024-08-27 DIAGNOSIS — E53.8 B12 DEFICIENCY: ICD-10-CM

## 2024-08-27 DIAGNOSIS — E05.90 SUBCLINICAL HYPERTHYROIDISM: ICD-10-CM

## 2024-08-27 LAB
T3FREE SERPL-MCNC: 3.5 PG/ML (ref 2.3–4.2)
T4 FREE SERPL-MCNC: 0.79 NG/DL (ref 0.61–1.12)
TSH SERPL-ACNC: 0.11 MIU/L (ref 0.44–3.98)
VIT B12 SERPL-MCNC: 958 PG/ML (ref 211–911)

## 2024-08-27 PROCEDURE — 84481 FREE ASSAY (FT-3): CPT

## 2024-08-27 PROCEDURE — 82607 VITAMIN B-12: CPT

## 2024-08-27 PROCEDURE — 36415 COLL VENOUS BLD VENIPUNCTURE: CPT

## 2024-08-27 PROCEDURE — 84443 ASSAY THYROID STIM HORMONE: CPT

## 2024-08-27 PROCEDURE — 84439 ASSAY OF FREE THYROXINE: CPT

## 2024-09-04 ENCOUNTER — APPOINTMENT (OUTPATIENT)
Dept: GASTROENTEROLOGY | Facility: CLINIC | Age: 79
End: 2024-09-04
Payer: MEDICARE

## 2024-09-04 VITALS
BODY MASS INDEX: 26.66 KG/M2 | SYSTOLIC BLOOD PRESSURE: 152 MMHG | WEIGHT: 160 LBS | HEART RATE: 108 BPM | HEIGHT: 65 IN | OXYGEN SATURATION: 95 % | DIASTOLIC BLOOD PRESSURE: 76 MMHG

## 2024-09-04 DIAGNOSIS — K59.03 THERAPEUTIC OPIOID INDUCED CONSTIPATION: ICD-10-CM

## 2024-09-04 DIAGNOSIS — T40.2X5A THERAPEUTIC OPIOID INDUCED CONSTIPATION: ICD-10-CM

## 2024-09-04 PROCEDURE — 1157F ADVNC CARE PLAN IN RCRD: CPT | Performed by: INTERNAL MEDICINE

## 2024-09-04 PROCEDURE — 3078F DIAST BP <80 MM HG: CPT | Performed by: INTERNAL MEDICINE

## 2024-09-04 PROCEDURE — 3077F SYST BP >= 140 MM HG: CPT | Performed by: INTERNAL MEDICINE

## 2024-09-04 PROCEDURE — 1123F ACP DISCUSS/DSCN MKR DOCD: CPT | Performed by: INTERNAL MEDICINE

## 2024-09-04 PROCEDURE — 99214 OFFICE O/P EST MOD 30 MIN: CPT | Performed by: INTERNAL MEDICINE

## 2024-09-04 NOTE — PATIENT INSTRUCTIONS
Continue with Amitiza 8mcg BID take for at least one week for OIC     Doculax PRN for constipation     Follow up in 3-4 months     If you have any questions please call my office at [phone].

## 2024-09-04 NOTE — PROGRESS NOTES
"    [facility]enterology    ASSESSMENT and PLAN:       Robyn Madera is a 79 year old female with a history of HTN, chronic constipation, asthma, pelvic floor dysfunction, RBBB, HLD, Graves disease, back pain/lumbar stenosis, and GERD who presents for consultation requested by her PCP (Reanna Martinez) for the evaluation of chronic constipation\".    1. Opoid induced Constipation  Symptoms consistent with constipation and pelvic floor dysfunction is likely playing a role as well. No evidence of more concerning etiology. She has had some benefit from MiraLAX and stool softeners,   - Continue with pelvic floor exercises   - Has failed  MiraLAX and stool softeners, discussed titration  - recommend lifestyle changes including fiber supplement, increased hydration, and increased physical activity patient has tried this and had np improvement   - Tolerating of Amitiza 8mcg  BID with miralax          2. Pancreatic Cyst   - Patient with repeat MRCP ordered by PCP    Ismael Pepper,          Gastroenterology    [facility]            Subjective   HISTORY OF PRESENT ILLNESS:     Chief Complaint  Follow-up (Last OV 6/19/24 for constipation - medication isn't helping as she would have hoped./Last egd 6/2023, last colon 8/2020 both with Dr. Newman)    History Of Present Illness:    Robyn Madera is a 75 year old female with a history of HTN, chronic constipation, asthma, pelvic floor dysfunction, RBBB, HLD, Graves disease, back pain/lumbar stenosis, and GERD who presents for consultation requested by her PCP (Reanna Martinez) for the evaluation of chronic constipation\".               Labs have shown an unremarkable CMP, low TSH (normal free T4), and unremarkable CBC with no anemia. Colonoscopy on 8/10/2020 was normal and random biopsies at that time were normal.     She reports that she has chronic abdominal discomfort and bloating which worsened when her " constipation worsened after starting on pain medications.     Ultimately this has been worse over the past 6 months. She was initially having a BM every 3 days, but now has been having a BM every other day. On some days the stool is soft and easy to pass, but on others it is small and hard. On those days she will have a lot of straining and she reports that she can feel it there, but can't pass it. She will have to sit on the toilet for a long time and move around to different positions to finally allow stool to pass. She has started using MiraLAX (1 capful per day) and she is also taking stool softeners daily. This has helped a little. Her pain is mostly lower in her abdomen and below the umbilicus. It is cramping and bloating. This improves following a BM and is usually worse when she is more constipated. She will also have abdominal bloating and cramping. No blood in her stools.     Patient does pelvic floor exercise with some improvident       She is maintained on oral Oxy 5 for chronic pain.     Patient denies any heartburn/GERD, N/V, dysphagia, odynophagia, diarrhea, hematemesis, hematochezia, melena, or weight loss.     Patient denies any family history of GI disease including specifically denying IBD, colorectal cancer, esophageal cancer, and gastric cancer.            Patient denies any heartburn/GERD, N/V, dysphagia, odynophagia, abdominal pain, diarrhea,  hematemesis, hematochezia, melena, or weight loss.      Endoscopy History:    EGD D in 2023 with Dr. Rollins gastric polyps seen all biopsies within normal limits    Colon 2020 with Dr. Newman was normal     Review of systems:   Review of Systems      I performed a complete 10 point review of systems and it is negative except as noted in HPI or above.        PAST HISTORIES:       Past Medical History:  She has a past medical history of Abnormal findings on diagnostic imaging of heart and coronary circulation (11/07/2019), Abnormal levels of other serum  enzymes (10/24/2019), Allergic rhinitis due to animal hair or dander (10/28/2013), Anxiety (2023), Arthritis (2016), Chronic constipation (2022), Combined form of senile cataract of both eyes (10/29/2014), Disease of thyroid gland (2018), Diverticulosis of colon (06/17/2013), Diverticulosis of intestine, part unspecified, without perforation or abscess without bleeding, Dizziness, Gastric polyp (06/23/2014), GERD (gastroesophageal reflux disease) (2008), Glaucoma suspect, both eyes (01/08/2015), HL (hearing loss) (2022), squamous cell carcinoma of skin (06/09/2015), Hypertension (2015), Internal hemorrhoid (03/21/2016), Irregular heart beat, Low TSH level (03/10/2023), Lung nodule, solitary (03/10/2023), Lung nodule, solitary (03/10/2023), Mild intermittent asthma without complication ([facility]) (02/24/2016), Other microscopic hematuria (03/01/2018), Other specified abnormal findings of blood chemistry (10/19/2021), Other specified abnormal findings of blood chemistry (10/19/2021), Pain in right knee, Personal history of other diseases of the digestive system, Personal history of other diseases of the musculoskeletal system and connective tissue (01/17/2020), Personal history of other diseases of the musculoskeletal system and connective tissue, Personal history of other diseases of the musculoskeletal system and connective tissue, Personal history of other diseases of the nervous system and sense organs, Personal history of other diseases of the nervous system and sense organs, Personal history of other diseases of the respiratory system, Personal history of other endocrine, nutritional and metabolic disease, Personal history of other endocrine, nutritional and metabolic disease, Personal history of other endocrine, nutritional and metabolic disease, Personal history of other malignant neoplasm of skin, Personal history of other mental and behavioral disorders, Presence of right artificial knee joint (09/21/2017),  "Scoliosis (2008), Spinal stenosis, Vision loss, Visual impairment (2008), Wears partial dentures, and Wrist sprain (03/10/2023).    Past Surgical History:  She has a past surgical history that includes Knee arthroscopy w/ debridement (07/09/2016); Other surgical history (10/14/2019); Joint replacement (Bilateral, 2017-18); and Hip Arthroplasty.      Social History:  She reports that she has never smoked. She has never used smokeless tobacco. She reports that she does not currently use alcohol. She reports that she does not use drugs.    Family History:  No known GI disease, specifically denies pancreatitis, Crohn's, colon cancer, gastroesophageal cancer, or ulcerative colitis.    Family History   Problem Relation Name Age of Onset   • Diabetes Mother Jaci Bingham         Allergies:  House dust, Adhesive tape-silicones, Cat dander, and Penicillins        Objective   OBJECTIVE:       Last Recorded Vitals:  Vitals:    09/04/24 1125   BP: 152/76   Pulse: 108   SpO2: 95%   Weight: 72.6 kg (160 lb)   Height: 1.651 m (5' 5\")     /76   Pulse 108   Ht 1.651 m (5' 5\")   Wt 72.6 kg (160 lb)   SpO2 95%   BMI 26.63 kg/m²      Physical Exam:    Physical Exam  Vitals reviewed.   Constitutional:       General: She is awake.      Appearance: Normal appearance.   HENT:      Head: Normocephalic.      Mouth/Throat:      Mouth: Mucous membranes are moist.   Eyes:      Extraocular Movements: Extraocular movements intact.   Cardiovascular:      Rate and Rhythm: Normal rate.      Heart sounds: Normal heart sounds.   Pulmonary:      Effort: Pulmonary effort is normal.      Breath sounds: Normal breath sounds.   Abdominal:      General: Bowel sounds are normal.      Palpations: Abdomen is soft.      Tenderness: There is no abdominal tenderness. There is no guarding or rebound.      Hernia: No hernia is present.   Musculoskeletal:         General: Normal range of motion.      Cervical back: Neck supple.   Skin:     General: Skin " is warm and dry.   Neurological:      General: No focal deficit present.      Mental Status: She is alert.   Psychiatric:         Attention and Perception: Attention and perception normal.         Mood and Affect: Mood normal.         Behavior: Behavior normal.           Home Medications:  Prior to Admission medications    Medication Sig Start Date End Date Taking? Authorizing Provider   amLODIPine (Norvasc) 10 mg tablet Take 1 tablet (10 mg) by mouth once daily. 8/21/23 8/20/24  Reanna Martinez MD   aspirin 81 mg EC tablet Take 1 tablet (81 mg) by mouth once daily.    Historical Provider, MD   atorvastatin (Lipitor) 40 mg tablet Take 1 tablet (40 mg) by mouth once daily at bedtime. 8/21/23 8/20/24  Reanna Martinez MD   busPIRone (Buspar) 15 mg tablet Take 1 tablet (15 mg) by mouth 2 times a day. 6/10/24 12/7/24  Reanna Martinez MD   calcium carbonate-vitamin D3 500 mg-3.125 mcg (125 unit) tablet tablet Take by mouth.    Historical Provider, MD   cyanocobalamin (Vitamin B-12) 1,000 mcg tablet Take 1 tablet (1,000 mcg) by mouth once daily.    Historical Provider, MD   diazePAM (Valium) 5 mg tablet Take 1 tablet (5 mg) by mouth 1 time for 1 dose. 6/10/24 6/10/24  Reanna Martinez MD   docusate sodium (Colace) 100 mg capsule Take by mouth twice a day.    Historical Provider, MD   DULoxetine (Cymbalta) 30 mg DR capsule Take 1 capsule (30 mg) by mouth once daily. Do not crush or chew.  Patient will take duloxetine 30 mg and 60 mg capsule daily for a total dose of 90 mg daily.   Please hold until fill date Do not start before March 29, 2024. 3/29/24 6/27/24  Cooper Merchant MD   DULoxetine (Cymbalta) 60 mg DR capsule Take 1 capsule (60 mg) by mouth once daily. Do not crush or chew.  Will take duloxetine 60 mg and 30 mg capsule daily for a total dose of 90 mg daily.  Please hold until fill date Do not start before March 29, 2024. 3/29/24 6/27/24  Cooper Merchant MD   esomeprazole (NexIUM) 20 mg DR  "capsule Take 1 capsule (20 mg) by mouth once daily.    Historical Provider, MD   gabapentin (Neurontin) 100 mg capsule Take 3 capsules (300 mg) by mouth 3 times a day. 12/11/23 12/10/24  Cooper Merchant MD   mirabegron (Myrbetriq) 25 mg tablet extended release 24 hr 24 hr tablet Take 1 tablet (25 mg) by mouth 2 times a day.  Patient not taking: Reported on 6/13/2024 6/4/24 6/4/25  Gino Haas MD   mirabegron (Myrbetriq) 50 mg tablet extended release 24 hr 24 hr tablet Take 1 tablet (50 mg) by mouth once daily. 2/27/24 2/21/25  Gino Haas MD   oxyCODONE (Roxicodone) 5 mg immediate release tablet Take 1 tablet (5 mg) by mouth 2 times a day as needed for severe pain (7 - 10). Do not fill before June 19, 2024. 6/19/24 7/19/24  AUSTEN CodyCNP, APRN-CNS   polyethylene glycol (Glycolax) 17 gram/dose powder Take by mouth every other day. 2/28/20   Historical Provider, MD   oxyCODONE (Roxicodone) 5 mg immediate release tablet Take 1 tablet (5 mg) by mouth 2 times a day as needed for severe pain (7 - 10). Do not fill before May 20, 2024. 5/20/24 6/13/24  BERNARDO Cody, ELENI-CNS         Relevant Results Recent labs reviewed in the EMR.  Lab Results   Component Value Date    HGB 13.3 08/05/2024    HGB 13.7 06/03/2024    HGB 13.4 03/01/2024    MCV 96 08/05/2024    MCV 97 06/03/2024    MCV 98 03/01/2024     08/05/2024     06/03/2024     03/01/2024       No results found for: \"FERRITIN\", \"IRON\"    Lab Results   Component Value Date     08/05/2024    K 4.3 08/05/2024     08/05/2024    BUN 15 08/05/2024    CREATININE 0.72 08/05/2024       Lab Results   Component Value Date    BILITOT 0.7 06/03/2024     Lab Results   Component Value Date    ALT 17 06/03/2024    ALT 19 03/01/2024    ALT 19 08/14/2023    ALT 20 02/13/2023    ALT 29 08/09/2022    AST 20 06/03/2024    AST 24 03/01/2024    AST 24 08/14/2023    AST 26 02/13/2023    AST 34 08/09/2022    ALKPHOS 83 06/03/2024    " "ALKPHOS 75 03/01/2024    ALKPHOS 69 08/14/2023    ALKPHOS 73 02/13/2023    ALKPHOS 80 08/09/2022       No results found for: \"CRP\"    No results found for: \"CALPS\"    Radiology: Reviewed imaging reviewed in the EMR.  No results found.      "

## 2024-09-06 ENCOUNTER — TELEPHONE (OUTPATIENT)
Dept: PRIMARY CARE | Facility: CLINIC | Age: 79
End: 2024-09-06

## 2024-09-06 ENCOUNTER — APPOINTMENT (OUTPATIENT)
Dept: ENDOCRINOLOGY | Facility: CLINIC | Age: 79
End: 2024-09-06
Payer: MEDICARE

## 2024-09-06 VITALS
WEIGHT: 161.2 LBS | RESPIRATION RATE: 16 BRPM | DIASTOLIC BLOOD PRESSURE: 74 MMHG | HEIGHT: 65 IN | HEART RATE: 118 BPM | SYSTOLIC BLOOD PRESSURE: 138 MMHG | BODY MASS INDEX: 26.86 KG/M2

## 2024-09-06 DIAGNOSIS — E05.90 SUBCLINICAL HYPERTHYROIDISM: Primary | ICD-10-CM

## 2024-09-06 DIAGNOSIS — E04.2 MULTIPLE THYROID NODULES: ICD-10-CM

## 2024-09-06 DIAGNOSIS — M85.80 OSTEOPENIA, UNSPECIFIED LOCATION: ICD-10-CM

## 2024-09-06 PROCEDURE — 3075F SYST BP GE 130 - 139MM HG: CPT | Performed by: INTERNAL MEDICINE

## 2024-09-06 PROCEDURE — 1036F TOBACCO NON-USER: CPT | Performed by: INTERNAL MEDICINE

## 2024-09-06 PROCEDURE — 1157F ADVNC CARE PLAN IN RCRD: CPT | Performed by: INTERNAL MEDICINE

## 2024-09-06 PROCEDURE — 99214 OFFICE O/P EST MOD 30 MIN: CPT | Performed by: INTERNAL MEDICINE

## 2024-09-06 PROCEDURE — 1123F ACP DISCUSS/DSCN MKR DOCD: CPT | Performed by: INTERNAL MEDICINE

## 2024-09-06 PROCEDURE — 3078F DIAST BP <80 MM HG: CPT | Performed by: INTERNAL MEDICINE

## 2024-09-06 PROCEDURE — 1160F RVW MEDS BY RX/DR IN RCRD: CPT | Performed by: INTERNAL MEDICINE

## 2024-09-06 PROCEDURE — 1159F MED LIST DOCD IN RCRD: CPT | Performed by: INTERNAL MEDICINE

## 2024-09-06 ASSESSMENT — ENCOUNTER SYMPTOMS
NAUSEA: 0
HEADACHES: 0
DIARRHEA: 0
PALPITATIONS: 0
CHILLS: 0
SHORTNESS OF BREATH: 0
FEVER: 0
VOMITING: 0
FATIGUE: 0
COUGH: 0

## 2024-09-06 NOTE — ASSESSMENT & PLAN NOTE
Discussed course.  Went over trial of methimazole to see if she feels better with improved thyroid levels (anxiety).  She is concerned with current med list and prefers not to start a med unless absolutely necessary.  Since subclinical ok to hold off

## 2024-09-06 NOTE — PROGRESS NOTES
Endocrinology: Follow up visit  Subjective   Patient ID: Robyn Madera is a 79 y.o. female who presents for Hyperthyroidism and Goiter.    PCP: Reanna Martinez MD    HPI  Subclinical hyperthyroidism  Us in June 2023 was stable with no further eval needed    Uptake and scan in December 2023 normal. Watching levels for now.   Tsi negative tpo +    Since last visit continues to deal with anxiety and chronic delma issues.  Concerned with length of med list  Review of Systems   Constitutional:  Negative for chills, fatigue and fever.   Respiratory:  Negative for cough and shortness of breath.    Cardiovascular:  Negative for chest pain and palpitations.   Gastrointestinal:  Negative for diarrhea, nausea and vomiting.   Neurological:  Negative for headaches.       Patient Active Problem List   Diagnosis    Agatston CAC score, >400    Benign essential hypertension    Bilateral hearing loss    Chronic constipation    Other specified disease of esophagus    Hashimoto's thyroiditis    Hip pain, bilateral    Mixed hyperlipidemia    Impaired fasting glucose    Lumbar spondylolysis    Multiple thyroid nodules    Osteopenia    Pelvic floor dysfunction    Primary osteoarthritis of right hip    PVC's (premature ventricular contractions)    RBBB (right bundle branch block with left anterior fascicular block)    Spinal stenosis of lumbar region    Subclinical hyperthyroidism    Tinnitus of both ears    Generalized anxiety disorder    Lumbosacral spondylosis    Primary osteoarthritis of left knee    Hiatal hernia    Chronic lumbar radiculopathy    Lumbar scoliosis    Microhematuria    Overactive bladder    Leg edema, right    Elevated coronary artery calcium score    B12 deficiency    Long term (current) use of opiate analgesic    Medicare annual wellness visit, subsequent    Venous reflux    Pancreatic cyst ([facility])    Gastroesophageal reflux disease    Contusion of right knee        Home Meds:  Current Outpatient  Medications   Medication Instructions    amLODIPine (NORVASC) 10 mg, oral, Daily    aspirin 81 mg EC tablet 1 tablet, oral, Daily    atorvastatin (LIPITOR) 40 mg, oral, Nightly    busPIRone (BUSPAR) 15 mg, oral, 2 times daily    calcium carbonate-vitamin D3 500 mg-3.125 mcg (125 unit) tablet tablet oral    cholecalciferol (VITAMIN D3) 1,600 Units, oral, Daily    clopidogrel (PLAVIX) 75 mg, oral, Daily    cyanocobalamin (VITAMIN B-12) 1,000 mcg, oral, Daily    diazePAM (VALIUM) 5 mg, oral, Once    docusate sodium (Colace) 100 mg capsule oral, 2 times daily    DULoxetine (CYMBALTA) 30 mg, oral, Daily, Do not crush or chew.  Patient will take duloxetine 30 mg and 60 mg capsule daily for a total dose of 90 mg daily.   Please hold until fill date    DULoxetine (CYMBALTA) 60 mg, oral, Daily, Do not crush or chew.  Will take duloxetine 60 mg and 30 mg capsule daily for a total dose of 90 mg daily.  Please hold until fill date    esomeprazole (NexIUM) 20 mg DR capsule 1 capsule, oral, Daily    gabapentin (NEURONTIN) 300 mg, oral, 3 times daily    lubiprostone (AMITIZA) 8 mcg, oral, 2 times daily (morning and late afternoon), Take with meals    mirabegron (MYRBETRIQ) 50 mg, oral, Daily    oxyCODONE (ROXICODONE) 5 mg, oral, 2 times daily PRN    polyethylene glycol (Glycolax) 17 gram/dose powder oral, Every other day        Allergies   Allergen Reactions    House Dust Hives    Adhesive Tape-Silicones Other     ADHESIVES CAUSES BLISTERS    Cat Dander Unknown    Penicillins Hives        Objective   Vitals:    09/06/24 1106   BP: 138/74   Pulse: (!) 118   Resp: 16      Vitals:    09/06/24 1106   Weight: 73.1 kg (161 lb 3.2 oz)      Body mass index is 26.83 kg/m².   Physical Exam  Constitutional:       Appearance: Normal appearance. She is overweight.   HENT:      Head: Normocephalic and atraumatic.   Neck:      Thyroid: No thyroid mass, thyromegaly or thyroid tenderness.   Cardiovascular:      Rate and Rhythm: Normal rate and  regular rhythm.      Heart sounds: No murmur heard.     No gallop.   Pulmonary:      Effort: Pulmonary effort is normal.      Breath sounds: Normal breath sounds.   Abdominal:      Palpations: Abdomen is soft.      Comments: benign   Neurological:      General: No focal deficit present.      Mental Status: She is alert and oriented to person, place, and time.      Deep Tendon Reflexes: Reflexes are normal and symmetric.   Psychiatric:         Behavior: Behavior is cooperative.         Labs:  Lab Results   Component Value Date    HGBA1C 6.4 (H) 06/03/2024    LDLDIRECT 65 06/03/2024    TSH 0.11 (L) 08/27/2024    FREET4 0.79 08/27/2024      Lab Results   Component Value Date    THYROIDPAB >1,000 (H) 11/30/2023    TSI 1.1 11/30/2023        Assessment/Plan   Assessment & Plan  Subclinical hyperthyroidism    Discussed course.  Went over trial of methimazole to see if she feels better with improved thyroid levels (anxiety).  She is concerned with current med list and prefers not to start a med unless absolutely necessary.  Since subclinical ok to hold off  Multiple thyroid nodules    Due for us 2028  Osteopenia, unspecified location    Dexa due 2025  Last dexa 2023 looked good      Electronically signed by:  Roselia Rivera MD 09/06/24 11:22 AM

## 2024-09-16 ENCOUNTER — OFFICE VISIT (OUTPATIENT)
Dept: PAIN MEDICINE | Facility: HOSPITAL | Age: 79
End: 2024-09-16
Payer: MEDICARE

## 2024-09-16 VITALS
WEIGHT: 162.6 LBS | BODY MASS INDEX: 26.13 KG/M2 | HEIGHT: 66 IN | DIASTOLIC BLOOD PRESSURE: 70 MMHG | OXYGEN SATURATION: 95 % | RESPIRATION RATE: 16 BRPM | HEART RATE: 99 BPM | SYSTOLIC BLOOD PRESSURE: 136 MMHG

## 2024-09-16 DIAGNOSIS — M48.061 SPINAL STENOSIS OF LUMBAR REGION, UNSPECIFIED WHETHER NEUROGENIC CLAUDICATION PRESENT: ICD-10-CM

## 2024-09-16 DIAGNOSIS — M54.16 LUMBAR RADICULOPATHY: ICD-10-CM

## 2024-09-16 DIAGNOSIS — Z79.891 LONG TERM PRESCRIPTION OPIATE USE: Primary | ICD-10-CM

## 2024-09-16 DIAGNOSIS — M79.2 NEUROPATHIC PAIN: ICD-10-CM

## 2024-09-16 DIAGNOSIS — Z79.891 LONG TERM (CURRENT) USE OF OPIATE ANALGESIC: ICD-10-CM

## 2024-09-16 DIAGNOSIS — M47.816 LUMBAR SPONDYLOSIS: ICD-10-CM

## 2024-09-16 LAB
AMPHETAMINES UR QL SCN: NORMAL
BARBITURATES UR QL SCN: NORMAL
BZE UR QL SCN: NORMAL
CANNABINOIDS UR QL SCN: NORMAL
CREAT UR-MCNC: 48 MG/DL (ref 20–320)
ETHANOL ?TM UR-MCNC: <10 MG/DL
PCP UR QL SCN: NORMAL

## 2024-09-16 PROCEDURE — 80348 DRUG SCREENING BUPRENORPHINE: CPT | Performed by: PHYSICAL MEDICINE & REHABILITATION

## 2024-09-16 PROCEDURE — 1123F ACP DISCUSS/DSCN MKR DOCD: CPT | Performed by: PHYSICAL MEDICINE & REHABILITATION

## 2024-09-16 PROCEDURE — 3078F DIAST BP <80 MM HG: CPT | Performed by: PHYSICAL MEDICINE & REHABILITATION

## 2024-09-16 PROCEDURE — 80307 DRUG TEST PRSMV CHEM ANLYZR: CPT | Mod: PORLAB | Performed by: PHYSICAL MEDICINE & REHABILITATION

## 2024-09-16 PROCEDURE — 99214 OFFICE O/P EST MOD 30 MIN: CPT | Performed by: PHYSICAL MEDICINE & REHABILITATION

## 2024-09-16 PROCEDURE — 80372 DRUG SCREENING TAPENTADOL: CPT | Mod: PORLAB | Performed by: PHYSICAL MEDICINE & REHABILITATION

## 2024-09-16 PROCEDURE — 1157F ADVNC CARE PLAN IN RCRD: CPT | Performed by: PHYSICAL MEDICINE & REHABILITATION

## 2024-09-16 PROCEDURE — 3075F SYST BP GE 130 - 139MM HG: CPT | Performed by: PHYSICAL MEDICINE & REHABILITATION

## 2024-09-16 PROCEDURE — 80346 BENZODIAZEPINES1-12: CPT | Mod: PORLAB | Performed by: PHYSICAL MEDICINE & REHABILITATION

## 2024-09-16 RX ORDER — OXYCODONE HYDROCHLORIDE 5 MG/1
5 TABLET ORAL 2 TIMES DAILY PRN
Qty: 60 TABLET | Refills: 0 | Status: SHIPPED | OUTPATIENT
Start: 2024-09-16 | End: 2024-10-16

## 2024-09-16 RX ORDER — DULOXETIN HYDROCHLORIDE 30 MG/1
30 CAPSULE, DELAYED RELEASE ORAL DAILY
Qty: 90 CAPSULE | Refills: 0 | Status: SHIPPED | OUTPATIENT
Start: 2024-09-16 | End: 2024-12-15

## 2024-09-16 RX ORDER — DULOXETIN HYDROCHLORIDE 60 MG/1
60 CAPSULE, DELAYED RELEASE ORAL DAILY
Qty: 90 CAPSULE | Refills: 0 | Status: SHIPPED | OUTPATIENT
Start: 2024-09-16 | End: 2024-12-15

## 2024-09-16 ASSESSMENT — ENCOUNTER SYMPTOMS
LOSS OF SENSATION IN FEET: 0
OCCASIONAL FEELINGS OF UNSTEADINESS: 1
DEPRESSION: 0

## 2024-09-16 ASSESSMENT — LIFESTYLE VARIABLES: TOTAL SCORE: 0

## 2024-09-16 ASSESSMENT — COLUMBIA-SUICIDE SEVERITY RATING SCALE - C-SSRS
1. IN THE PAST MONTH, HAVE YOU WISHED YOU WERE DEAD OR WISHED YOU COULD GO TO SLEEP AND NOT WAKE UP?: NO
2. HAVE YOU ACTUALLY HAD ANY THOUGHTS OF KILLING YOURSELF?: NO
6. HAVE YOU EVER DONE ANYTHING, STARTED TO DO ANYTHING, OR PREPARED TO DO ANYTHING TO END YOUR LIFE?: NO

## 2024-09-16 NOTE — PROGRESS NOTES
"Subjective   Patient ID: Robyn Madera is a 79 y.o. female who presents for Back Pain and Neuralgia.  HPI  Patient returns to the office for interval re-evaluation of \"lower back pain intermittent sciatic pain, periods of neuropathy to bilateral legs and feet.  Overall the patient is doing fairly well with her current medication regimen of gabapentin 300 mg 3 times daily, she did try doing 200 mg 4 times a day but did not notice any difference at that dosing.  Continues to take very low-dose oxycodone 5 mg twice daily which she is managing fairly well, she does have some side effects of drowsiness when she wakes up in the morning but it is manageable.    Would like referral for gait/stability again had fall early September. Concerned Gabapentin may cause swelling in leg.  She is really only having more unilateral leg swelling on the left, they did do a stent on the right side which did improve the swelling.  She continues to see vascular surgery and is seeing them in having evaluation next week.    8/9/24-right iliac stent only-Dr Tolbert    Pain Score: 3/10     Treatment: Oxycodone 5mg BID  Medication count: 10 pills left in rx bottle  Last doses taken: 9/16/24 1 dose and BID  Fill date:8/16/24     Efficacy: 75-80% relief     Narcan: EXP. 12/2025     Other medications/neuromodulators: Gabapentin 300MG TID-no refill , Cymbalta 30mg/60mg-refill needed [facility] #90 day supply/Tylenol     Injections and/or Procedures: none recent      Other: does home exercises  OARRS:  Cooper Merchant MD on 9/16/2024 10:27 AM  I have personally reviewed the OARRS report for Robyn Madera. I have considered the risks of abuse, dependence, addiction and diversion and I believe that it is clinically appropriate for Robyn Madera to be prescribed this medication    Is the patient prescribed a combination of a benzodiazepine and opioid?  No    Last Urine Drug Screen / ordered today: Yes  Recent Results (from the past 8760 " hour(s))   Screen Opiate/Opioid/Benzo Prescription Compliance    Collection Time: 03/18/24 11:20 AM   Result Value Ref Range    Creatinine, Urine Random 40.1 20.0 - 320.0 mg/dL    Amphetamine Screen, Urine Presumptive Negative Presumptive Negative    Barbiturate Screen, Urine Presumptive Negative Presumptive Negative    Cannabinoid Screen, Urine Presumptive Negative Presumptive Negative    Cocaine Metabolite Screen, Urine Presumptive Negative Presumptive Negative    PCP Screen, Urine Presumptive Negative Presumptive Negative   Tapentadol Confirmation, Urine    Collection Time: 03/18/24 11:19 AM   Result Value Ref Range    Tapentadol <25 <25 ng/mL    N-Desmethyltapentadol <25 <25 ng/mL   Buprenorphine Confirm,Urine    Collection Time: 03/18/24 11:19 AM   Result Value Ref Range    BUPRENORPHINE GLUC, URINE <5 ng/mL    BUPRENORPHINE ,URINE <2 ng/mL    NALOXONE, URINE <100 ng/mL    NORBUPRENORPHINE GLUC,URINE <5 ng/mL    NORBUPRENORPHINE, URINE <2 ng/mL     Results are as expected.     Controlled Substance Agreement:  Date of the Last Agreement: 9/16/24  Reviewed Controlled Substance Agreement including but not limited to the benefits, risks, and alternatives to treatment with a Controlled Substance medication(s).    Monitoring and compliance:    ORT:    PDUQ:    Office Agreement:      Review of Systems    ROS:   General: No fevers, chills, weight loss  Skin: Negative for lesions  Eyes: No acute vision changes  Ears: No vertigo  Nose, mouth, throat: No difficulty swallowing or speaking  Respiratory: No cough, shortness of breath, cyanosis  Cardiovascular: Negative for chest pain syncope or palpitation  Gastrointestinal: No constipation, nausea, vomiting  Neurological: Negative for headache, positive for:  Psychological: Negative for severe or debilitating anxiety, depression. Negative memory loss  Musculoskeletal: Positive for  Endocrine: Negative for weight gain, appetite changes, excessive sweating  Allergy/immune:  Negative    All 13 systems were reviewed and are within normal levels except as noted or in the history of present illness.  Positive or pertinent negative responses are noted or were in the history of present illness. As noted, the patient denies significant or impairing weakness in the bilateral upper and lower extremities, medication induced constipation, and bowel or bladder incontinence.     Current Outpatient Medications:     amLODIPine (Norvasc) 10 mg tablet, Take 1 tablet (10 mg) by mouth once daily., Disp: 90 tablet, Rfl: 3    aspirin 81 mg EC tablet, Take 1 tablet (81 mg) by mouth once daily., Disp: , Rfl:     busPIRone (Buspar) 15 mg tablet, Take 1 tablet (15 mg) by mouth 2 times a day., Disp: 180 tablet, Rfl: 1    calcium carbonate-vitamin D3 500 mg-3.125 mcg (125 unit) tablet tablet, Take by mouth., Disp: , Rfl:     cholecalciferol (Vitamin D3) 10 MCG (400 UNIT) tablet, Take 4 tablets (40 mcg) by mouth once daily., Disp: , Rfl:     cyanocobalamin (Vitamin B-12) 1,000 mcg tablet, Take 1 tablet (1,000 mcg) by mouth once daily., Disp: , Rfl:     esomeprazole (NexIUM) 20 mg DR capsule, Take 1 capsule (20 mg) by mouth once daily., Disp: , Rfl:     gabapentin (Neurontin) 100 mg capsule, Take 3 capsules (300 mg) by mouth 3 times a day., Disp: 810 capsule, Rfl: 3    lubiprostone (Amitiza) 8 mcg capsule, Take 1 capsule (8 mcg) by mouth 2 times daily (morning and late afternoon). Take with meals, Disp: 60 capsule, Rfl: 11    mirabegron (Myrbetriq) 50 mg tablet extended release 24 hr 24 hr tablet, Take 1 tablet (50 mg) by mouth once daily., Disp: 90 tablet, Rfl: 3    atorvastatin (Lipitor) 40 mg tablet, Take 1 tablet (40 mg) by mouth once daily at bedtime., Disp: 90 tablet, Rfl: 3    diazePAM (Valium) 5 mg tablet, Take 1 tablet (5 mg) by mouth 1 time for 1 dose., Disp: 2 tablet, Rfl: 0    docusate sodium (Colace) 100 mg capsule, Take by mouth twice a day., Disp: , Rfl:     DULoxetine (Cymbalta) 30 mg DR capsule,  Take 1 capsule (30 mg) by mouth once daily. Do not crush or chew.  Patient will take duloxetine 30 mg and 60 mg capsule daily for a total dose of 90 mg daily.   Please hold until fill date, Disp: 90 capsule, Rfl: 0    DULoxetine (Cymbalta) 60 mg DR capsule, Take 1 capsule (60 mg) by mouth once daily. Do not crush or chew.  Will take duloxetine 60 mg and 30 mg capsule daily for a total dose of 90 mg daily.  Please hold until fill date, Disp: 90 capsule, Rfl: 0    oxyCODONE (Roxicodone) 5 mg immediate release tablet, Take 1 tablet (5 mg) by mouth 2 times a day as needed for severe pain (7 - 10)., Disp: 60 tablet, Rfl: 0    polyethylene glycol (Glycolax) 17 gram/dose powder, Take by mouth every other day., Disp: , Rfl:      Past Medical History:   Diagnosis Date    Abnormal findings on diagnostic imaging of heart and coronary circulation 11/07/2019    Abnormal Heart Score CT    Abnormal levels of other serum enzymes 10/24/2019    Abnormal cardiac enzyme level    Allergic rhinitis due to animal hair or dander 10/28/2013    Anxiety 2023    Arthritis 2016    Chronic constipation 2022    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/2014    Disease of thyroid gland 2018    Diverticulosis of colon 06/17/2013    Diverticulosis of intestine, part unspecified, without perforation or abscess without bleeding     Diverticulosis    Dizziness     positional    Gastric polyp 06/23/2014    GERD (gastroesophageal reflux disease) 2008    Glaucoma suspect, both eyes 01/08/2015    HL (hearing loss) 2022    Hx of squamous cell carcinoma of skin 06/09/2015    Hypertension 2015    Internal hemorrhoid 03/21/2016    Irregular heart beat     Low TSH level 03/10/2023    Lung nodule, solitary 03/10/2023    Lung nodule, solitary 03/10/2023    Stable CT 6/23  No follow up needed    Mild intermittent asthma without complication ([facility]) 02/24/2016    Other microscopic hematuria 03/01/2018    Microscopic hematuria    Other specified abnormal  findings of blood chemistry 10/19/2021    Elevated LFTs    Other specified abnormal findings of blood chemistry 10/19/2021    Elevated LFTs    Pain in right knee     Right knee pain    Personal history of other diseases of the digestive system     History of hiatal hernia    Personal history of other diseases of the musculoskeletal system and connective tissue 01/17/2020    History of osteopenia    Personal history of other diseases of the musculoskeletal system and connective tissue     History of herniated intervertebral disc    Personal history of other diseases of the musculoskeletal system and connective tissue     History of chronic back pain    Personal history of other diseases of the nervous system and sense organs     History of cataract    Personal history of other diseases of the nervous system and sense organs     History of glaucoma    Personal history of other diseases of the respiratory system     History of allergic rhinitis    Personal history of other endocrine, nutritional and metabolic disease     History of hyperlipidemia    Personal history of other endocrine, nutritional and metabolic disease     History of Graves' disease    Personal history of other endocrine, nutritional and metabolic disease     History of hypoglycemia    Personal history of other malignant neoplasm of skin     History of malignant neoplasm of skin    Personal history of other mental and behavioral disorders     History of anxiety    Presence of right artificial knee joint 09/21/2017    Scoliosis 2008    Spinal stenosis     Vision loss     Visual impairment 2008    Wears partial dentures     upper    Wrist sprain 03/10/2023        Past Surgical History:   Procedure Laterality Date    HIP ARTHROPLASTY      right    JOINT REPLACEMENT Bilateral 2017-18    knees    KNEE ARTHROSCOPY W/ DEBRIDEMENT  07/09/2016    Arthroscopy Knee    OTHER SURGICAL HISTORY  10/14/2019    Excision of squamous cell carcinoma        Family History  "  Problem Relation Name Age of Onset    Diabetes Mother Jaci Bingham         Allergies   Allergen Reactions    House Dust Hives    Adhesive Tape-Silicones Other     ADHESIVES CAUSES BLISTERS    Cat Dander Unknown    Penicillins Hives        Objective     Visit Vitals  /70   Pulse 99   Resp 16   Ht 1.676 m (5' 6\")   Wt 73.8 kg (162 lb 9.6 oz)   SpO2 95%   BMI 26.24 kg/m²   OB Status Postmenopausal   Smoking Status Never   BSA 1.85 m²        Physical Exam    PE:  General: Well-developed, well-nourished, no acute distress. The patient demonstrates no pain behavior, symptom magnification or overt drug-seeking behavior.  Eye: Pupils appropriate for room lighting  Neck/thyroid: No obvious goiter or enlargement of neck noted  Respiratory exam: Normal respiratory effort, unlabored respiration. No accessory muscle use noted  Cardiac exam: Bilateral radial pulses intact  Abdominal: Nondistended  Spine, lumbar: The patient is able to rise from a seated to standing position without hesitancy, push off, or delay. Gait is grossly nonantalgic. Tenderness to paraspinous musculature is noted  Neurologic exam: Muscle strength is antigravity in all 4 extremities.  Psychiatric exam: Judgment and insight normal, affect normal, speech is fluent, affect appropriate, demonstrating no signs of hypersomnolence, sedation, or confusion        Physical exam as above except:  Patient ambulates slightly stooped with wide base gait with single-point cane.  2+ pitting edema in left lower extremity, 1+ right lower extremity      Assessment/Plan   Diagnoses and all orders for this visit:  Spinal stenosis of lumbar region, unspecified whether neurogenic claudication present  -     oxyCODONE (Roxicodone) 5 mg immediate release tablet; Take 1 tablet (5 mg) by mouth 2 times a day as needed for severe pain (7 - 10).  -     Referral to Physical Therapy; Future  Long term (current) use of opiate analgesic  -     oxyCODONE (Roxicodone) 5 mg immediate " release tablet; Take 1 tablet (5 mg) by mouth 2 times a day as needed for severe pain (7 - 10).  Lumbar radiculopathy  -     oxyCODONE (Roxicodone) 5 mg immediate release tablet; Take 1 tablet (5 mg) by mouth 2 times a day as needed for severe pain (7 - 10).  Lumbar spondylosis  -     oxyCODONE (Roxicodone) 5 mg immediate release tablet; Take 1 tablet (5 mg) by mouth 2 times a day as needed for severe pain (7 - 10).  Neuropathic pain  -     DULoxetine (Cymbalta) 30 mg DR capsule; Take 1 capsule (30 mg) by mouth once daily. Do not crush or chew.  Patient will take duloxetine 30 mg and 60 mg capsule daily for a total dose of 90 mg daily.   Please hold until fill date  -     DULoxetine (Cymbalta) 60 mg DR capsule; Take 1 capsule (60 mg) by mouth once daily. Do not crush or chew.  Will take duloxetine 60 mg and 30 mg capsule daily for a total dose of 90 mg daily.  Please hold until fill date    79-year-old female with lower back pain with claudication symptoms.  We did discuss that I do not think the gabapentin is causing the edema in her left greater than right lower extremity given that the right lower extremity did improve upon stenting.  She is doing as well as to be expected given her MRI findings of significant scoliosis and central canal and foraminal stenosis.  I do not think that she is a candidate for any type of advanced procedure such as mild or Vertiflex given the scoliosis and that she really does not have any significant ligamentum flavum hypertrophy.  We are really left with medication management.  She is managing her side effects well enough with duloxetine 90 mg gabapentin 300 mg 3 times daily and oxycodone 5 mg twice daily.  OARRS reviewed no issues.  We updated her urine drug screen and controlled substance agreement today.  Plan:  - Continue gabapentin 300 mg 3 times daily.  - Continue duloxetine 90 mg daily.  - Refill of oxycodone 5 mg twice daily.  - Follow-up in 3 months with my nurse  practitioner.  - Patient having some gait instability has done well with physical therapy and would like a referral back to PT.  This was provided today.

## 2024-09-17 ENCOUNTER — APPOINTMENT (OUTPATIENT)
Dept: UROLOGY | Facility: CLINIC | Age: 79
End: 2024-09-17
Payer: MEDICARE

## 2024-09-17 DIAGNOSIS — N32.81 OAB (OVERACTIVE BLADDER): Primary | ICD-10-CM

## 2024-09-17 DIAGNOSIS — R31.29 MICROSCOPIC HEMATURIA: ICD-10-CM

## 2024-09-17 PROCEDURE — 1157F ADVNC CARE PLAN IN RCRD: CPT | Performed by: STUDENT IN AN ORGANIZED HEALTH CARE EDUCATION/TRAINING PROGRAM

## 2024-09-17 PROCEDURE — 1036F TOBACCO NON-USER: CPT | Performed by: STUDENT IN AN ORGANIZED HEALTH CARE EDUCATION/TRAINING PROGRAM

## 2024-09-17 PROCEDURE — 1159F MED LIST DOCD IN RCRD: CPT | Performed by: STUDENT IN AN ORGANIZED HEALTH CARE EDUCATION/TRAINING PROGRAM

## 2024-09-17 PROCEDURE — 1123F ACP DISCUSS/DSCN MKR DOCD: CPT | Performed by: STUDENT IN AN ORGANIZED HEALTH CARE EDUCATION/TRAINING PROGRAM

## 2024-09-17 PROCEDURE — 99214 OFFICE O/P EST MOD 30 MIN: CPT | Performed by: STUDENT IN AN ORGANIZED HEALTH CARE EDUCATION/TRAINING PROGRAM

## 2024-09-17 RX ORDER — FESOTERODINE FUMARATE 4 MG/1
4 TABLET, FILM COATED, EXTENDED RELEASE ORAL DAILY
Qty: 90 TABLET | Refills: 3 | Status: SHIPPED | OUTPATIENT
Start: 2024-09-17 | End: 2025-09-17

## 2024-09-17 NOTE — PROGRESS NOTES
HISTORY OF PRESENT ILLNESS:  Robyn Madera is a 79 y.o. female who presents today for a follow up visit. She is doing okay with the medication. If she has caffeine, it does impact her symptoms. She stops taking liquids before bedtime.  She asks if she can take breaks on the Myrbetriq. She reports she is on a lot of treatments for other health maintenance. She takes her medication 2 to 3 hours before bed.          Past Medical History  She has a past medical history of Abnormal findings on diagnostic imaging of heart and coronary circulation (11/07/2019), Abnormal levels of other serum enzymes (10/24/2019), Allergic rhinitis due to animal hair or dander (10/28/2013), Anxiety (2023), Arthritis (2016), Chronic constipation (2022), Combined form of senile cataract of both eyes (10/29/2014), Disease of thyroid gland (2018), Diverticulosis of colon (06/17/2013), Diverticulosis of intestine, part unspecified, without perforation or abscess without bleeding, Dizziness, Gastric polyp (06/23/2014), GERD (gastroesophageal reflux disease) (2008), Glaucoma suspect, both eyes (01/08/2015), HL (hearing loss) (2022), squamous cell carcinoma of skin (06/09/2015), Hypertension (2015), Internal hemorrhoid (03/21/2016), Irregular heart beat, Low TSH level (03/10/2023), Lung nodule, solitary (03/10/2023), Lung nodule, solitary (03/10/2023), Mild intermittent asthma without complication ([facility]) (02/24/2016), Other microscopic hematuria (03/01/2018), Other specified abnormal findings of blood chemistry (10/19/2021), Other specified abnormal findings of blood chemistry (10/19/2021), Pain in right knee, Personal history of other diseases of the digestive system, Personal history of other diseases of the musculoskeletal system and connective tissue (01/17/2020), Personal history of other diseases of the musculoskeletal system and connective tissue, Personal history of other diseases of the musculoskeletal system and connective tissue,  "Personal history of other diseases of the nervous system and sense organs, Personal history of other diseases of the nervous system and sense organs, Personal history of other diseases of the respiratory system, Personal history of other endocrine, nutritional and metabolic disease, Personal history of other endocrine, nutritional and metabolic disease, Personal history of other endocrine, nutritional and metabolic disease, Personal history of other malignant neoplasm of skin, Personal history of other mental and behavioral disorders, Presence of right artificial knee joint (09/21/2017), Scoliosis (2008), Spinal stenosis, Vision loss, Visual impairment (2008), Wears partial dentures, and Wrist sprain (03/10/2023).    Surgical History  She has a past surgical history that includes Knee arthroscopy w/ debridement (07/09/2016); Other surgical history (10/14/2019); Joint replacement (Bilateral, 2017-18); and Hip Arthroplasty.     Social History  She reports that she has never smoked. She has never used smokeless tobacco. She reports that she does not currently use alcohol. She reports that she does not use drugs.    Family History  Family History   Problem Relation Name Age of Onset    Diabetes Mother Jaci Bingham         Allergies  House dust, Adhesive tape-silicones, Cat dander, and Penicillins      A comprehensive 10+ review of systems was negative except for: see hpi                          PHYSICAL EXAMINATION:  BP Readings from Last 3 Encounters:   09/16/24 136/70   09/06/24 138/74   09/04/24 152/76      Wt Readings from Last 3 Encounters:   09/16/24 73.8 kg (162 lb 9.6 oz)   09/06/24 73.1 kg (161 lb 3.2 oz)   09/04/24 72.6 kg (160 lb)      BMI: Estimated body mass index is 26.24 kg/m² as calculated from the following:    Height as of 9/16/24: 1.676 m (5' 6\").    Weight as of 9/16/24: 73.8 kg (162 lb 9.6 oz).  BSA: Estimated body surface area is 1.85 meters squared as calculated from the following:    Height as " "of 9/16/24: 1.676 m (5' 6\").    Weight as of 9/16/24: 73.8 kg (162 lb 9.6 oz).  HEENT: Normocephalic, atraumatic, PER EOMI, nonicteric, trachea normal, thyroid normal, oropharynx normal.  CARDIAC: regular rate & rhythm, S1 & S2 normal.  No heaves, thrills, gallops or murmurs.  LUNGS: Clear to auscultation, no spinal or CV tenderness.  EXTREMITIES: No evidence of cyanosis, clubbing or edema.               Assessment:  80 yo with microscopic hematuria and OAB      Nocturia/OAB  -improved with gemtesa, but too expensive, wants to stop and   Is noticing some improvement with Myrbetriq   clinical pharmacy referral   We discussed Vyvally device, may be interested  Discussed changing medication, or adding another medication  Rx fesoterodine in addition to myrbetriq   Can take both medications at bed time         Microhematuria:   Negative work-up, repeat UA 10/20/2024      Follow up in 3 months       All questions and concerns were answered and addressed.  The patient expressed understanding and agrees with the plan.     Gino Haas MD    Scribe Attestation  By signing my name below, I, Sera Vazquez, Scribdaniel   attest that this documentation has been prepared under the direction and in the presence of Gino Haas MD.  "

## 2024-09-19 ENCOUNTER — TELEPHONE (OUTPATIENT)
Dept: PRIMARY CARE | Facility: CLINIC | Age: 79
End: 2024-09-19
Payer: MEDICARE

## 2024-09-19 DIAGNOSIS — E78.2 MIXED HYPERLIPIDEMIA: ICD-10-CM

## 2024-09-19 LAB
1OH-MIDAZOLAM UR CFM-MCNC: <25 NG/ML
6MAM UR CFM-MCNC: <25 NG/ML
7AMINOCLONAZEPAM UR CFM-MCNC: <25 NG/ML
A-OH ALPRAZ UR CFM-MCNC: <25 NG/ML
ALPRAZ UR CFM-MCNC: <25 NG/ML
CHLORDIAZEP UR CFM-MCNC: <25 NG/ML
CLONAZEPAM UR CFM-MCNC: <25 NG/ML
CODEINE UR CFM-MCNC: <50 NG/ML
DIAZEPAM UR CFM-MCNC: <25 NG/ML
EDDP UR CFM-MCNC: <25 NG/ML
FENTANYL UR CFM-MCNC: <2.5 NG/ML
HYDROCODONE CTO UR CFM-MCNC: <25 NG/ML
HYDROMORPHONE UR CFM-MCNC: <25 NG/ML
LORAZEPAM UR CFM-MCNC: <25 NG/ML
METHADONE UR CFM-MCNC: <25 NG/ML
MIDAZOLAM UR CFM-MCNC: <25 NG/ML
MORPHINE UR CFM-MCNC: <50 NG/ML
NORDIAZEPAM UR CFM-MCNC: <25 NG/ML
NORFENTANYL UR CFM-MCNC: <2.5 NG/ML
NORHYDROCODONE UR CFM-MCNC: <25 NG/ML
NOROXYCODONE UR CFM-MCNC: >1000 NG/ML
NORTAPENTADOL UR CFM-MCNC: <25 NG/ML
NORTRAMADOL UR-MCNC: <50 NG/ML
OXAZEPAM UR CFM-MCNC: <25 NG/ML
OXYCODONE UR CFM-MCNC: 927 NG/ML
OXYMORPHONE UR CFM-MCNC: 159 NG/ML
TAPENTADOL UR CFM-MCNC: <25 NG/ML
TEMAZEPAM UR CFM-MCNC: <25 NG/ML
TRAMADOL UR CFM-MCNC: <50 NG/ML
ZOLPIDEM UR CFM-MCNC: <25 NG/ML
ZOLPIDEM UR-MCNC: <25 NG/ML

## 2024-09-20 LAB
BUPRENORPHINE UR-MCNC: <2 NG/ML
BUPRENORPHINE UR-MCNC: <5 NG/ML
NALOXONE UR CFM-MCNC: <100 NG/ML
NORBUPRENORPHINE UR CFM-MCNC: <5 NG/ML
NORBUPRENORPHINE UR-MCNC: <2 NG/ML

## 2024-09-20 RX ORDER — ATORVASTATIN CALCIUM 40 MG/1
40 TABLET, FILM COATED ORAL NIGHTLY
Qty: 90 TABLET | Refills: 3 | Status: SHIPPED | OUTPATIENT
Start: 2024-09-20 | End: 2025-09-20

## 2024-09-23 ENCOUNTER — HOSPITAL ENCOUNTER (OUTPATIENT)
Dept: VASCULAR MEDICINE | Facility: HOSPITAL | Age: 79
Discharge: HOME | End: 2024-09-23
Payer: MEDICARE

## 2024-09-23 DIAGNOSIS — I87.1 MAY-THURNER SYNDROME: ICD-10-CM

## 2024-09-23 PROCEDURE — 93978 VASCULAR STUDY: CPT | Performed by: INTERNAL MEDICINE

## 2024-09-23 PROCEDURE — 93978 VASCULAR STUDY: CPT

## 2024-09-25 ENCOUNTER — TELEPHONE (OUTPATIENT)
Dept: VASCULAR SURGERY | Facility: HOSPITAL | Age: 79
End: 2024-09-25
Payer: MEDICARE

## 2024-10-01 ENCOUNTER — OFFICE VISIT (OUTPATIENT)
Dept: VASCULAR SURGERY | Facility: HOSPITAL | Age: 79
End: 2024-10-01
Payer: MEDICARE

## 2024-10-01 VITALS
BODY MASS INDEX: 25.99 KG/M2 | SYSTOLIC BLOOD PRESSURE: 158 MMHG | WEIGHT: 161 LBS | DIASTOLIC BLOOD PRESSURE: 79 MMHG | HEART RATE: 112 BPM

## 2024-10-01 DIAGNOSIS — I87.2 VENOUS INSUFFICIENCY: Primary | ICD-10-CM

## 2024-10-01 PROCEDURE — 3078F DIAST BP <80 MM HG: CPT | Performed by: SURGERY

## 2024-10-01 PROCEDURE — 1123F ACP DISCUSS/DSCN MKR DOCD: CPT | Performed by: SURGERY

## 2024-10-01 PROCEDURE — 3077F SYST BP >= 140 MM HG: CPT | Performed by: SURGERY

## 2024-10-01 PROCEDURE — 99213 OFFICE O/P EST LOW 20 MIN: CPT | Performed by: SURGERY

## 2024-10-01 PROCEDURE — 1159F MED LIST DOCD IN RCRD: CPT | Performed by: SURGERY

## 2024-10-01 PROCEDURE — 1036F TOBACCO NON-USER: CPT | Performed by: SURGERY

## 2024-10-01 PROCEDURE — 1157F ADVNC CARE PLAN IN RCRD: CPT | Performed by: SURGERY

## 2024-10-01 NOTE — PROGRESS NOTES
Subjective   Patient ID: Robyn Madera is a 79 y.o. female who presents for Follow-up (Groin pain since venogram 8/9/24).  HPI  Patient is here for follow-up secondary to venous stent placement  P she is doing well  She is still having some left lower extremity swelling no pain or discomfort she is active and amatory no active ulcer sores noted    Review of Systems    Objective   Physical Exam  Physical exam    Constitutional: alert and in no acute distress verbal  Eyes: No erythema swelling or discharge noted  Neck: supple, symmetric, trachea midline, no masses noted  Cardiovascular: Carotid pulses 2+, no obvious bruit, no Jugular distension noted, no thrill, heart regular rate, lower extremity vascular exam intact, cap refill <2 sec  Pulmonary:  Bilateral breath sounds intact, clear with rales rhonchi or wheeze  Abdomen: soft non tender, no pulsatile masses noted, no rebound rigidity or guarding noted  Skin: intact warm no abnormal turgor  Psychiatric: alert without any obvious cognitive issues, oriented to person, place, and time  1 Plus left pretibial edema  Right lower extremity minimal edema noted  Assessment/Plan   Status post right iliac vein stent placement  IVC duplex demonstrates patent stent  She is having some swelling in her left lower extremity at this time I advised her to double Tubigrip her left lower extremity  Elevate as at night  Continue walking  Follow-up as scheduled in November.         Jagjit Tolbert DO 10/01/24 3:34 PM

## 2024-10-09 ENCOUNTER — TELEPHONE (OUTPATIENT)
Dept: PAIN MEDICINE | Facility: HOSPITAL | Age: 79
End: 2024-10-09
Payer: MEDICARE

## 2024-10-14 DIAGNOSIS — M48.061 SPINAL STENOSIS OF LUMBAR REGION, UNSPECIFIED WHETHER NEUROGENIC CLAUDICATION PRESENT: ICD-10-CM

## 2024-10-14 DIAGNOSIS — M47.816 LUMBAR SPONDYLOSIS: ICD-10-CM

## 2024-10-14 DIAGNOSIS — Z79.891 LONG TERM (CURRENT) USE OF OPIATE ANALGESIC: ICD-10-CM

## 2024-10-14 DIAGNOSIS — M54.16 LUMBAR RADICULOPATHY: ICD-10-CM

## 2024-10-14 RX ORDER — OXYCODONE HYDROCHLORIDE 5 MG/1
5 TABLET ORAL 2 TIMES DAILY PRN
Qty: 60 TABLET | Refills: 0 | Status: SHIPPED | OUTPATIENT
Start: 2024-10-17 | End: 2024-11-16

## 2024-10-14 NOTE — TELEPHONE ENCOUNTER
Pt is requesting refill of     oxycodone 5 mg 1 tablet po BID Awa Waddell                                                      LV:   9/16/24                 NV:   12/11/24               OARRS reviewed with LFD:    9/17/24 #60/30 days                        Pended RX to Dr. Merchant for transmission to pharmacy.

## 2024-10-28 ENCOUNTER — TELEPHONE (OUTPATIENT)
Dept: VASCULAR SURGERY | Facility: HOSPITAL | Age: 79
End: 2024-10-28
Payer: MEDICARE

## 2024-11-13 DIAGNOSIS — Z79.891 LONG TERM (CURRENT) USE OF OPIATE ANALGESIC: ICD-10-CM

## 2024-11-13 DIAGNOSIS — M47.816 LUMBAR SPONDYLOSIS: ICD-10-CM

## 2024-11-13 DIAGNOSIS — M48.061 SPINAL STENOSIS OF LUMBAR REGION, UNSPECIFIED WHETHER NEUROGENIC CLAUDICATION PRESENT: ICD-10-CM

## 2024-11-13 DIAGNOSIS — M54.16 LUMBAR RADICULOPATHY: ICD-10-CM

## 2024-11-13 RX ORDER — OXYCODONE HYDROCHLORIDE 5 MG/1
5 TABLET ORAL 2 TIMES DAILY PRN
Qty: 60 TABLET | Refills: 0 | Status: SHIPPED | OUTPATIENT
Start: 2024-11-16 | End: 2024-12-16

## 2024-11-13 NOTE — TELEPHONE ENCOUNTER
Pt is requesting refill of   oxycodone 5 mg 1 tablet po BID                                                         LV:    9/16/24                  NV:  12/11/24                OARRS reviewed with LFD:   10/17/24 #60/30 days                        Pended RX to Dr. Merchant for transmission to pharmacy

## 2024-11-18 ENCOUNTER — APPOINTMENT (OUTPATIENT)
Dept: CARDIOLOGY | Facility: CLINIC | Age: 79
End: 2024-11-18
Payer: MEDICARE

## 2024-11-26 ENCOUNTER — LAB (OUTPATIENT)
Dept: LAB | Facility: LAB | Age: 79
End: 2024-11-26
Payer: MEDICARE

## 2024-11-26 ENCOUNTER — TELEPHONE (OUTPATIENT)
Dept: CARDIOLOGY | Facility: HOSPITAL | Age: 79
End: 2024-11-26

## 2024-11-26 ENCOUNTER — APPOINTMENT (OUTPATIENT)
Dept: VASCULAR SURGERY | Facility: HOSPITAL | Age: 79
End: 2024-11-26
Payer: MEDICARE

## 2024-11-26 DIAGNOSIS — I10 BENIGN ESSENTIAL HYPERTENSION: ICD-10-CM

## 2024-11-26 DIAGNOSIS — E53.8 B12 DEFICIENCY: ICD-10-CM

## 2024-11-26 DIAGNOSIS — R73.01 IMPAIRED FASTING GLUCOSE: ICD-10-CM

## 2024-11-26 DIAGNOSIS — E05.90 SUBCLINICAL HYPERTHYROIDISM: ICD-10-CM

## 2024-11-26 DIAGNOSIS — E78.2 MIXED HYPERLIPIDEMIA: ICD-10-CM

## 2024-11-26 LAB
ALBUMIN SERPL BCP-MCNC: 4.5 G/DL (ref 3.4–5)
ALP SERPL-CCNC: 90 U/L (ref 33–136)
ALT SERPL W P-5'-P-CCNC: 17 U/L (ref 7–45)
ANION GAP SERPL CALC-SCNC: 13 MMOL/L (ref 10–20)
AST SERPL W P-5'-P-CCNC: 22 U/L (ref 9–39)
BILIRUB SERPL-MCNC: 0.9 MG/DL (ref 0–1.2)
BUN SERPL-MCNC: 14 MG/DL (ref 6–23)
CALCIUM SERPL-MCNC: 9.2 MG/DL (ref 8.6–10.3)
CHLORIDE SERPL-SCNC: 104 MMOL/L (ref 98–107)
CHOLEST SERPL-MCNC: 169 MG/DL (ref 0–199)
CHOLESTEROL/HDL RATIO: 2
CO2 SERPL-SCNC: 29 MMOL/L (ref 21–32)
CREAT SERPL-MCNC: 0.72 MG/DL (ref 0.5–1.05)
EGFRCR SERPLBLD CKD-EPI 2021: 86 ML/MIN/1.73M*2
EST. AVERAGE GLUCOSE BLD GHB EST-MCNC: 134 MG/DL
GLUCOSE SERPL-MCNC: 134 MG/DL (ref 74–99)
HBA1C MFR BLD: 6.3 %
HDLC SERPL-MCNC: 85.3 MG/DL
LDLC SERPL CALC-MCNC: 69 MG/DL
LDLC SERPL DIRECT ASSAY-MCNC: 62 MG/DL (ref 0–129)
NON HDL CHOLESTEROL: 84 MG/DL (ref 0–149)
POTASSIUM SERPL-SCNC: 4.1 MMOL/L (ref 3.5–5.3)
PROT SERPL-MCNC: 7.6 G/DL (ref 6.4–8.2)
SODIUM SERPL-SCNC: 142 MMOL/L (ref 136–145)
T4 FREE SERPL-MCNC: 0.84 NG/DL (ref 0.61–1.12)
TRIGL SERPL-MCNC: 72 MG/DL (ref 0–149)
TSH SERPL-ACNC: 0.25 MIU/L (ref 0.44–3.98)
VIT B12 SERPL-MCNC: 745 PG/ML (ref 211–911)
VLDL: 14 MG/DL (ref 0–40)

## 2024-11-26 PROCEDURE — 84443 ASSAY THYROID STIM HORMONE: CPT

## 2024-11-26 PROCEDURE — 83036 HEMOGLOBIN GLYCOSYLATED A1C: CPT

## 2024-11-26 PROCEDURE — 84439 ASSAY OF FREE THYROXINE: CPT

## 2024-11-26 PROCEDURE — 80061 LIPID PANEL: CPT

## 2024-11-26 PROCEDURE — 80053 COMPREHEN METABOLIC PANEL: CPT

## 2024-11-26 PROCEDURE — 82607 VITAMIN B-12: CPT

## 2024-11-26 PROCEDURE — 36415 COLL VENOUS BLD VENIPUNCTURE: CPT

## 2024-11-26 PROCEDURE — 83721 ASSAY OF BLOOD LIPOPROTEIN: CPT

## 2024-11-26 NOTE — TELEPHONE ENCOUNTER
Patient called in with pre op questions for her CT scan. I told the patient that there was no paperwork she needs in hand to be seen. And gave instructions, patient understood.     Thank you!  Deon KUMARI

## 2024-11-27 ENCOUNTER — OFFICE VISIT (OUTPATIENT)
Dept: VASCULAR SURGERY | Facility: HOSPITAL | Age: 79
End: 2024-11-27
Payer: MEDICARE

## 2024-11-27 ENCOUNTER — APPOINTMENT (OUTPATIENT)
Dept: VASCULAR SURGERY | Facility: HOSPITAL | Age: 79
End: 2024-11-27
Payer: MEDICARE

## 2024-11-27 VITALS
WEIGHT: 156 LBS | HEART RATE: 102 BPM | SYSTOLIC BLOOD PRESSURE: 162 MMHG | DIASTOLIC BLOOD PRESSURE: 77 MMHG | BODY MASS INDEX: 25.18 KG/M2

## 2024-11-27 DIAGNOSIS — M79.605 PAIN IN LEFT LEG: ICD-10-CM

## 2024-11-27 DIAGNOSIS — I87.2 VENOUS INSUFFICIENCY: Primary | ICD-10-CM

## 2024-11-27 PROCEDURE — 1157F ADVNC CARE PLAN IN RCRD: CPT | Performed by: PHYSICIAN ASSISTANT

## 2024-11-27 PROCEDURE — 3078F DIAST BP <80 MM HG: CPT | Performed by: PHYSICIAN ASSISTANT

## 2024-11-27 PROCEDURE — 1159F MED LIST DOCD IN RCRD: CPT | Performed by: PHYSICIAN ASSISTANT

## 2024-11-27 PROCEDURE — 99213 OFFICE O/P EST LOW 20 MIN: CPT | Performed by: PHYSICIAN ASSISTANT

## 2024-11-27 PROCEDURE — 1036F TOBACCO NON-USER: CPT | Performed by: PHYSICIAN ASSISTANT

## 2024-11-27 PROCEDURE — 3077F SYST BP >= 140 MM HG: CPT | Performed by: PHYSICIAN ASSISTANT

## 2024-11-27 PROCEDURE — 1123F ACP DISCUSS/DSCN MKR DOCD: CPT | Performed by: PHYSICIAN ASSISTANT

## 2024-11-27 ASSESSMENT — ENCOUNTER SYMPTOMS
DIARRHEA: 0
SLEEP DISTURBANCE: 0
SPEECH DIFFICULTY: 0
WEAKNESS: 0
CONSTIPATION: 0
LIGHT-HEADEDNESS: 0
CHILLS: 0
PALPITATIONS: 0
FACIAL ASYMMETRY: 0
ADENOPATHY: 0
NECK PAIN: 0
WOUND: 0
CONFUSION: 0
ARTHRALGIAS: 0
COLOR CHANGE: 0
FATIGUE: 0
WHEEZING: 0
JOINT SWELLING: 0
DIFFICULTY URINATING: 0
SHORTNESS OF BREATH: 0
VOMITING: 0
NUMBNESS: 0
SEIZURES: 0
COUGH: 0
SORE THROAT: 0
FEVER: 0
NAUSEA: 0
TROUBLE SWALLOWING: 0
HEADACHES: 0
ABDOMINAL PAIN: 0
DIZZINESS: 0

## 2024-11-27 NOTE — PROGRESS NOTES
Subjective   Patient ID: Robyn Madera is a 79 y.o. female who presents for Follow-up (Iliac stenting fuv/Venous insuff ).  HPI  79 year old female presents for follow up visit for lower extremity swelling. History of right iliac vein stenting per Dr Tolbert 8/9/24. Was seen for follow up visit in October and was complaining of left lower extermity swelling at that time, she has been wearing double tubigrip to the LLE with some improvement however swelling is still persisting. No wounds or ulcerations. Has been trying to elevate as much as possible. Ambulating.     9/23/24 IVC/iliac duplex  Pertinent History: Right common iliac vein stent.        CONCLUSIONS:  Aorta/Common Iliac Arteries/IVC: The inferior vena cava appears patent with no evidence of thrombosis. Right common iliac vein stent appears patent.  Additional Findings:  Technically limited due to heavy overlying bowel gas.     8/9/24 bilateral venogram IVUS RIGHT iliac vein stent  Review of Systems   Constitutional:  Negative for chills, fatigue and fever.   HENT:  Negative for congestion, sore throat and trouble swallowing.    Eyes:  Negative for visual disturbance.   Respiratory:  Negative for cough, shortness of breath and wheezing.    Cardiovascular:  Positive for leg swelling. Negative for chest pain and palpitations.   Gastrointestinal:  Negative for abdominal pain, constipation, diarrhea, nausea and vomiting.   Endocrine: Negative for cold intolerance and heat intolerance.   Genitourinary:  Negative for difficulty urinating.   Musculoskeletal:  Negative for arthralgias, joint swelling and neck pain.   Skin:  Negative for color change and wound.   Neurological:  Negative for dizziness, seizures, syncope, facial asymmetry, speech difficulty, weakness, light-headedness, numbness and headaches.   Hematological:  Negative for adenopathy.   Psychiatric/Behavioral:  Negative for behavioral problems, confusion and sleep disturbance.      Vitals:     24 1141   BP: 162/77   Pulse: 102   Weight: 70.8 kg (156 lb)      Objective   Physical Exam  Constitutional:       Appearance: Normal appearance.   HENT:      Head: Normocephalic.      Right Ear: External ear normal.      Left Ear: External ear normal.      Nose: Nose normal.      Mouth/Throat:      Mouth: Mucous membranes are moist.   Eyes:      Extraocular Movements: Extraocular movements intact.   Neck:      Vascular: No carotid bruit.   Cardiovascular:      Rate and Rhythm: Normal rate and regular rhythm.      Pulses: Normal pulses.      Comments: LLE minimal edema noted    RLE 2+ edema noted, no wounds or ulcerations  Pulmonary:      Effort: Pulmonary effort is normal. No respiratory distress.      Breath sounds: Normal breath sounds.   Abdominal:      Palpations: Abdomen is soft.      Tenderness: There is no abdominal tenderness.   Musculoskeletal:         General: No swelling or tenderness.      Cervical back: Normal range of motion and neck supple.      Right lower le+ Edema present.      Left lower leg: No edema.   Skin:     General: Skin is warm and dry.      Capillary Refill: Capillary refill takes less than 2 seconds.      Findings: No lesion.   Neurological:      General: No focal deficit present.      Mental Status: She is alert and oriented to person, place, and time. Mental status is at baseline.   Psychiatric:         Mood and Affect: Mood normal.         Behavior: Behavior normal.         Thought Content: Thought content normal.         Judgment: Judgment normal.         Assessment/Plan   Problem List Items Addressed This Visit    None  Visit Diagnoses         Codes    Venous insufficiency    -  Primary I87.2    Relevant Orders    Vascular US lower extremity venous insufficiency left    Pain in left leg     M79.605    Relevant Orders    Vascular US lower extremity venous insufficiency left        79 year old female presents for follow up visit for lower extremity swelling. History of  right iliac vein stenting per Dr Tolbert 8/9/24.  -persistent LLE edema despite compression and elevation  -LLE VI  -continue compression and elevation  -follow up with Dr Tolbert after testing to discuss possible repeat venogram IVUS  -call with questions or concerns         Leonor Dawkins PA-C 11/27/24 2:22 PM

## 2024-12-01 NOTE — PROGRESS NOTES
"Counseling:  The patient was counseled regarding diagnostic results, instructions for management, risk factor reductions, prognosis, patient and family education, impressions, risks and benefits of treatment options and importance of compliance with treatment.      Chief Complaint:   The patient presents today for 10-month followup of HTN and dyslipidemia.     History Of Present Illness:    Robyn Madera is a 79 year old female patient who presents today for 10-month followup of HTN and dyslipidemia. Her PMH is significant for hyperlipidemia, hyperthyroidism, abnormal CT calcium score, asymmetric SNHL, GERD, May Thurner Syndrome s/p right iliac vein stenting 08/09/2024, Graves disease and RBBB. Since last being seen, the patient states that she has done well from a cardiac standpoint. She denies any CP, chest discomfort or SOB. She reports persistent LLE edema, with the edema in her right leg being significantly improved, for which she continues to follow with Dr. Tolbert. Per the patient, she will be undergoing hip replacement surgery in the New Year. BP has been stable. The patient is compliant with her prescribed medications.     Last Recorded Vitals:  Vitals:    12/02/24 1305   BP: 136/78   BP Location: Right arm   Pulse: 101   Weight: 72.1 kg (159 lb)   Height: 1.676 m (5' 6\")       Past Surgical History:  She has a past surgical history that includes Knee arthroscopy w/ debridement (07/09/2016); Other surgical history (10/14/2019); Joint replacement (Bilateral, 2017-18); and Hip Arthroplasty.      Social History:  She reports that she has never smoked. She has never used smokeless tobacco. She reports that she does not currently use alcohol. She reports that she does not use drugs.    Family History:  Family History   Problem Relation Name Age of Onset    Diabetes Mother Jaci Bingham         Allergies:  House dust, Adhesive tape-silicones, Cat dander, and Penicillins    Outpatient Medications:  Current " Outpatient Medications   Medication Instructions    acetaminophen (TYLENOL) 500 mg, Daily    amLODIPine (NORVASC) 10 mg, oral, Daily    aspirin 81 mg EC tablet 1 tablet, Daily    atorvastatin (LIPITOR) 40 mg, oral, Nightly    busPIRone (BUSPAR) 15 mg, oral, 2 times daily    calcium carbonate-vitamin D3 500 mg-3.125 mcg (125 unit) tablet tablet Take by mouth.    cholecalciferol (VITAMIN D3) 1,600 Units, Daily    cyanocobalamin (VITAMIN B-12) 1,000 mcg, Daily    diazePAM (VALIUM) 5 mg, oral, Once    docusate sodium (Colace) 100 mg capsule 2 times daily    DULoxetine (CYMBALTA) 30 mg, oral, Daily, Do not crush or chew.  Patient will take duloxetine 30 mg and 60 mg capsule daily for a total dose of 90 mg daily.   Please hold until fill date    DULoxetine (CYMBALTA) 60 mg, oral, Daily, Do not crush or chew.  Will take duloxetine 60 mg and 30 mg capsule daily for a total dose of 90 mg daily.  Please hold until fill date    esomeprazole (NexIUM) 20 mg DR capsule 1 capsule, Daily    fesoterodine (TOVIAZ) 4 mg, oral, Daily    gabapentin (NEURONTIN) 300 mg, oral, 3 times daily    lubiprostone (AMITIZA) 8 mcg, oral, 2 times daily (morning and late afternoon), Take with meals    mirabegron (MYRBETRIQ) 50 mg, oral, Daily    omeprazole OTC (PRILOSEC OTC) 20 mg, Daily before breakfast    oxyCODONE (ROXICODONE) 5 mg, oral, 2 times daily PRN    polyethylene glycol (Glycolax) 17 gram/dose powder Every other day     Review of Systems   Cardiovascular:  Positive for leg swelling.   All other systems reviewed and are negative.     Physical Exam:  Constitutional:       Appearance: Healthy appearance. Not in distress.   Neck:      Vascular: No JVR. JVD normal.   Pulmonary:      Effort: Pulmonary effort is normal.      Breath sounds: Normal breath sounds. No wheezing. No rhonchi. No rales.   Chest:      Chest wall: Not tender to palpatation.   Cardiovascular:      PMI at left midclavicular line. Normal rate. Regular rhythm. Normal S1.  Normal S2.       Murmurs: There is no murmur.      No gallop.  No click. No rub.   Pulses:     Intact distal pulses.   Edema:     Peripheral edema absent.   Abdominal:      General: Bowel sounds are normal.      Palpations: Abdomen is soft.      Tenderness: There is no abdominal tenderness.   Musculoskeletal: Normal range of motion.         General: No tenderness. Skin:     General: Skin is warm and dry.   Neurological:      General: No focal deficit present.      Mental Status: Alert and oriented to person, place and time.          Last Labs:  CBC -  Lab Results   Component Value Date    WBC 5.2 08/05/2024    HGB 13.3 08/05/2024    HCT 40.1 08/05/2024    MCV 96 08/05/2024     08/05/2024       CMP -  Lab Results   Component Value Date    CALCIUM 9.2 11/26/2024    PROT 7.6 11/26/2024    ALBUMIN 4.5 11/26/2024    AST 22 11/26/2024    ALT 17 11/26/2024    ALKPHOS 90 11/26/2024    BILITOT 0.9 11/26/2024       LIPID PANEL -   Lab Results   Component Value Date    CHOL 169 11/26/2024    TRIG 72 11/26/2024    HDL 85.3 11/26/2024    CHHDL 2.0 11/26/2024    LDLF 53 08/14/2023    VLDL 14 11/26/2024    NHDL 84 11/26/2024       RENAL FUNCTION PANEL -   Lab Results   Component Value Date    GLUCOSE 134 (H) 11/26/2024     11/26/2024    K 4.1 11/26/2024     11/26/2024    CO2 29 11/26/2024    ANIONGAP 13 11/26/2024    BUN 14 11/26/2024    CREATININE 0.72 11/26/2024    CALCIUM 9.2 11/26/2024    ALBUMIN 4.5 11/26/2024        Lab Results   Component Value Date    HGBA1C 6.3 (H) 11/26/2024       Last Cardiology Tests:  04/25/2024 - LE Venous Insufficiency U/S  1. Right Lower Venous Insufficiency: Reflux is noted in the saphenofemoral junction vein. There is reflux noted in the common femoral vein. Right small saphenous vein arises proximal to the sapheno-popliteal junction.  2. Left Lower Venous Insufficiency: Reflux is noted in the saphenofemoral junction vein. There is reflux noted in the common femoral and  proximal femoral veins. The left small saphenous vein arises proximal to the sapheno-popliteal junction.  3. Right Lower Venous: No evidence of acute deep vein thrombus visualized in the right lower extremity.  4. Left Lower Venous: No evidence of acute deep vein thrombus visualized in the left lower extremity.    07/02/2021 - NM Cardiac Stress Test  1. Normal stress myocardial perfusion imaging in response to pharmacologic stress. Calculated ejection fraction of 59% without segmental wall motion abnormality seen.  2. No clinical or electrocardiographic evidence for ischemia at maximal infusion.      02/21/2020 - CT Chest  Mild atherosclerotic calcification of thoracic aorta. No aneurysm. Heart is normal in size. Moderate coronary artery atherosclerotic calcifications. No pleural effusion The imaged upper abdomen is normal in CT appearance. There are degenerative changes in the spine. Chest wall is intact. There are no suspicious osseous lesions. 1.2 x 0.5 cm lobulated nodule in the lingula is stable. No new suspicious pulmonary nodules seen.     11/07/2019 - Dobutamine Stress Echo  1. The resting ejection fraction was estimated at 55-60% with a peak exercise ejection fraction estimated at 70-75%.  2. Normal global left ventricular systolic function.  3. Abnormal stress test.  4. ECG changes consistent with ischemia.  5. There were no stress-induced wall motion abnormalities. This is a negative stress echo test for ischemia.      08/22/2019 - CT Cardiac Score  1. Coronary artery calcium score of 597*.   2. 9 x 5 mm nodule in the lingula. Consider surveillance CT chest in about 6 months to document stability.    Lab review: I have personally reviewed the laboratory result(s).     Assessment/Plan   1) Abnormal CT Calcium Score - Dyslipidemia  On atorvastatin 40 mg daily  Negative Nuclear Stress test  Counselled about secondary risk factor modification keeping LDL <70  Lipid panel 11/26/2024 with LDL of 69; at  goal  Scheduled for CT calcium score 12/12/2024  Denies CP, chest discomfort or SOB  Will be undergoing hip replacement in the New Year - Low risk from a cardiac standpoint  Continue current medical Rx   F/U 1 year     2) HTN  Stable   On amlodipine 10 mg daily   Continue current medical Rx   F/U 1 year     3) BLE Edema s/p Right Iliac Stent 08/09/2024  Management per vascular   Compression stockings  LE venous insufficiency U/S 04/25/2024 positive for reflux   Now s/p bilateral lower venogram with intravascular U/S and right iliac vein stent 08/09/2024  Reports persistent LLE edema  Edema of right leg significantly improved   Recommend followup with Dr. Tolbert re: carotids       Scribe Attestation  By signing my name below, I, Rusty Jacobson   attest that this documentation has been prepared under the direction and in the presence of Carl William MD.

## 2024-12-02 ENCOUNTER — APPOINTMENT (OUTPATIENT)
Dept: CARDIOLOGY | Facility: HOSPITAL | Age: 79
End: 2024-12-02
Payer: MEDICARE

## 2024-12-02 VITALS
WEIGHT: 159 LBS | BODY MASS INDEX: 25.55 KG/M2 | HEART RATE: 101 BPM | HEIGHT: 66 IN | DIASTOLIC BLOOD PRESSURE: 78 MMHG | SYSTOLIC BLOOD PRESSURE: 136 MMHG

## 2024-12-02 DIAGNOSIS — R93.1 AGATSTON CAC SCORE, >400: ICD-10-CM

## 2024-12-02 DIAGNOSIS — I10 BENIGN ESSENTIAL HYPERTENSION: Primary | ICD-10-CM

## 2024-12-02 PROCEDURE — 3075F SYST BP GE 130 - 139MM HG: CPT | Performed by: INTERNAL MEDICINE

## 2024-12-02 PROCEDURE — 1123F ACP DISCUSS/DSCN MKR DOCD: CPT | Performed by: INTERNAL MEDICINE

## 2024-12-02 PROCEDURE — 99213 OFFICE O/P EST LOW 20 MIN: CPT | Performed by: INTERNAL MEDICINE

## 2024-12-02 PROCEDURE — 1160F RVW MEDS BY RX/DR IN RCRD: CPT | Performed by: INTERNAL MEDICINE

## 2024-12-02 PROCEDURE — 1036F TOBACCO NON-USER: CPT | Performed by: INTERNAL MEDICINE

## 2024-12-02 PROCEDURE — 1157F ADVNC CARE PLAN IN RCRD: CPT | Performed by: INTERNAL MEDICINE

## 2024-12-02 PROCEDURE — 3078F DIAST BP <80 MM HG: CPT | Performed by: INTERNAL MEDICINE

## 2024-12-02 PROCEDURE — 1159F MED LIST DOCD IN RCRD: CPT | Performed by: INTERNAL MEDICINE

## 2024-12-02 RX ORDER — ACETAMINOPHEN 500 MG
500 TABLET ORAL DAILY
COMMUNITY

## 2024-12-02 RX ORDER — OMEPRAZOLE 20 MG/1
20 TABLET, DELAYED RELEASE ORAL
COMMUNITY

## 2024-12-02 ASSESSMENT — ENCOUNTER SYMPTOMS
OCCASIONAL FEELINGS OF UNSTEADINESS: 1
DEPRESSION: 0
LOSS OF SENSATION IN FEET: 0

## 2024-12-02 NOTE — PATIENT INSTRUCTIONS
Continue all current medications as prescribed.   Dr. William has recommended that you followup with Dr. Tolbert regarding your carotid arteries.  Followup with Dr. William in 1 year, sooner should any issues or concerns arise before then.     If you have any questions or cardiac concerns, please call our office at [phone].

## 2024-12-09 ENCOUNTER — APPOINTMENT (OUTPATIENT)
Dept: PRIMARY CARE | Facility: CLINIC | Age: 79
End: 2024-12-09
Payer: MEDICARE

## 2024-12-10 ENCOUNTER — APPOINTMENT (OUTPATIENT)
Dept: PRIMARY CARE | Facility: CLINIC | Age: 79
End: 2024-12-10
Payer: MEDICARE

## 2024-12-10 VITALS
SYSTOLIC BLOOD PRESSURE: 138 MMHG | WEIGHT: 155.6 LBS | RESPIRATION RATE: 16 BRPM | BODY MASS INDEX: 25.11 KG/M2 | OXYGEN SATURATION: 96 % | TEMPERATURE: 97.6 F | DIASTOLIC BLOOD PRESSURE: 70 MMHG | HEART RATE: 94 BPM

## 2024-12-10 DIAGNOSIS — E05.90 SUBCLINICAL HYPERTHYROIDISM: ICD-10-CM

## 2024-12-10 DIAGNOSIS — E78.2 MIXED HYPERLIPIDEMIA: ICD-10-CM

## 2024-12-10 DIAGNOSIS — R73.01 IMPAIRED FASTING GLUCOSE: ICD-10-CM

## 2024-12-10 DIAGNOSIS — F41.9 ACUTE ANXIETY: ICD-10-CM

## 2024-12-10 DIAGNOSIS — I10 BENIGN ESSENTIAL HYPERTENSION: ICD-10-CM

## 2024-12-10 DIAGNOSIS — E53.8 B12 DEFICIENCY: ICD-10-CM

## 2024-12-10 PROCEDURE — 3078F DIAST BP <80 MM HG: CPT | Performed by: NURSE PRACTITIONER

## 2024-12-10 PROCEDURE — G2211 COMPLEX E/M VISIT ADD ON: HCPCS | Performed by: NURSE PRACTITIONER

## 2024-12-10 PROCEDURE — 1159F MED LIST DOCD IN RCRD: CPT | Performed by: NURSE PRACTITIONER

## 2024-12-10 PROCEDURE — 99215 OFFICE O/P EST HI 40 MIN: CPT | Performed by: NURSE PRACTITIONER

## 2024-12-10 PROCEDURE — 1123F ACP DISCUSS/DSCN MKR DOCD: CPT | Performed by: NURSE PRACTITIONER

## 2024-12-10 PROCEDURE — 1036F TOBACCO NON-USER: CPT | Performed by: NURSE PRACTITIONER

## 2024-12-10 PROCEDURE — 3075F SYST BP GE 130 - 139MM HG: CPT | Performed by: NURSE PRACTITIONER

## 2024-12-10 PROCEDURE — 1157F ADVNC CARE PLAN IN RCRD: CPT | Performed by: NURSE PRACTITIONER

## 2024-12-10 RX ORDER — DICLOFENAC SODIUM 10 MG/G
4 GEL TOPICAL 4 TIMES DAILY PRN
COMMUNITY

## 2024-12-10 RX ORDER — BUSPIRONE HYDROCHLORIDE 15 MG/1
15 TABLET ORAL 2 TIMES DAILY
Qty: 180 TABLET | Refills: 1 | Status: SHIPPED | OUTPATIENT
Start: 2024-12-10 | End: 2025-06-08

## 2024-12-10 RX ORDER — LACTASE 3000 UNIT
3000 TABLET ORAL 3 TIMES DAILY PRN
COMMUNITY

## 2024-12-10 ASSESSMENT — ENCOUNTER SYMPTOMS
CONSTITUTIONAL NEGATIVE: 1
ABDOMINAL PAIN: 0
COUGH: 0
BACK PAIN: 1
NAUSEA: 0
ACTIVITY CHANGE: 0
FEVER: 0
APNEA: 0
CONFUSION: 0
HEADACHES: 0
PALPITATIONS: 0
FATIGUE: 0
CHILLS: 0
VOMITING: 0
WEAKNESS: 0
RESPIRATORY NEGATIVE: 1
WHEEZING: 0
DIZZINESS: 0
NERVOUS/ANXIOUS: 0
ARTHRALGIAS: 1
SHORTNESS OF BREATH: 0

## 2024-12-10 NOTE — PROGRESS NOTES
Subjective   Patient ID: Robyn Madera is a 79 y.o. female who presents for Osteoarthritis, Chronic Venous Insufficiency, Hyperthyroidism, and Pain.    Osteoarthritis: Patient using diclofenac over-the-counter for management.  Reports that she is having difficulty finding Tylenol arthritis over-the-counter.  Recommend that she speak with a pharmacist for assistance while in store.  Needs left hip replacement and reports she will have this scheduled after the holidays.     Neuropathic pain, spinal stenosis, lumbar spondylosis: Managed by pain management.  Stable on gabapentin, oxycodone and Cymbalta    Dairy intolerance: Wants meds updated to reflect she is now taking a lactase enzyme over-the-counter once daily which has provided relief    Venous insufficiency: Managed by vascular specialist.  Wears compression hose and elevates frequently.  History of right iliac vein stenting 8/9/2024.  Having increasing left lower extremity swelling.  Appointment with vascular for follow-up to discuss repeating venogram IVUS    B12 deficiency: Takes oral B12 daily.  Encouraged to continue taking/normal level at this time    Impaired fasting glucose: Hemoglobin A1c 6.3%.  Discussed increasing diet and exercise    Complains of dry mouth: Likely related to Toviaz for bladder incontinence discussed medication with patient and that she should follow-up with urology for management.  Also recommended Biotene over-the-counter.  Patient reports she does not have this med on her list while she is in office and she is concerned of why she left it off her list however she reports she is taking her bladder incontinence medication.  Will print med list for patient and she can check when she is home         Review of Systems   Constitutional: Negative.  Negative for activity change, chills, fatigue and fever.   HENT:          Dry mouth   Respiratory: Negative.  Negative for apnea, cough, shortness of breath and wheezing.    Cardiovascular:   Positive for leg swelling. Negative for chest pain and palpitations.   Gastrointestinal:  Negative for abdominal pain, nausea and vomiting.   Musculoskeletal:  Positive for arthralgias and back pain.   Neurological:  Negative for dizziness, weakness and headaches.   Psychiatric/Behavioral:  Negative for confusion. The patient is not nervous/anxious.        Objective   /70   Pulse 94   Temp 36.4 °C (97.6 °F) (Temporal)   Resp 16   Wt 70.6 kg (155 lb 9.6 oz)   SpO2 96%   BMI 25.11 kg/m²     Physical Exam  Vitals reviewed.   Constitutional:       Appearance: Normal appearance.   HENT:      Head: Normocephalic.   Cardiovascular:      Rate and Rhythm: Normal rate and regular rhythm.      Pulses: Normal pulses.      Heart sounds: Normal heart sounds.   Pulmonary:      Effort: Pulmonary effort is normal. No respiratory distress.      Breath sounds: Normal breath sounds. No wheezing.   Abdominal:      General: Bowel sounds are normal.   Musculoskeletal:      Left lower le+ Edema present.   Neurological:      General: No focal deficit present.      Mental Status: She is alert and oriented to person, place, and time.   Psychiatric:         Mood and Affect: Mood normal.         Behavior: Behavior normal.         Assessment/Plan   Problem List Items Addressed This Visit             ICD-10-CM    Benign essential hypertension I10     .Well-controlled  Continue current plan of care  Follow-up in 6 months with labs          Relevant Orders    Comprehensive Metabolic Panel    Mixed hyperlipidemia E78.2     .Well-controlled  Continue current plan of care  Follow-up in 6 months with labs          Relevant Orders    Lipid Panel    Cholesterol, LDL Direct    Impaired fasting glucose R73.01     Continue diet/ exercise   Recheck 6 months  Lab Results   Component Value Date    HGBA1C 6.3 (H) 2024             Relevant Orders    Comprehensive Metabolic Panel    Hemoglobin A1C    Subclinical hyperthyroidism E05.90      Follow up with endo         Relevant Orders    Thyroxine, Free    TSH with reflex to Free T4 if abnormal    Acute anxiety F41.9     Refill buspar         Relevant Medications    busPIRone (Buspar) 15 mg tablet    B12 deficiency E53.8     Continue oral supplement   Follow up 6 months for recheck         Relevant Orders    Vitamin B12

## 2024-12-10 NOTE — ASSESSMENT & PLAN NOTE
Continue diet/ exercise   Recheck 6 months  Lab Results   Component Value Date    HGBA1C 6.3 (H) 11/26/2024

## 2024-12-11 ENCOUNTER — OFFICE VISIT (OUTPATIENT)
Dept: PAIN MEDICINE | Facility: HOSPITAL | Age: 79
End: 2024-12-11
Payer: MEDICARE

## 2024-12-11 VITALS
SYSTOLIC BLOOD PRESSURE: 136 MMHG | BODY MASS INDEX: 24.91 KG/M2 | OXYGEN SATURATION: 98 % | RESPIRATION RATE: 16 BRPM | HEART RATE: 97 BPM | HEIGHT: 66 IN | WEIGHT: 155 LBS | DIASTOLIC BLOOD PRESSURE: 71 MMHG

## 2024-12-11 DIAGNOSIS — M54.16 LUMBAR RADICULOPATHY: ICD-10-CM

## 2024-12-11 DIAGNOSIS — M79.2 NEUROPATHIC PAIN: ICD-10-CM

## 2024-12-11 DIAGNOSIS — Z79.891 LONG TERM (CURRENT) USE OF OPIATE ANALGESIC: ICD-10-CM

## 2024-12-11 DIAGNOSIS — M54.16 CHRONIC LUMBAR RADICULOPATHY: Primary | ICD-10-CM

## 2024-12-11 DIAGNOSIS — M48.062 SPINAL STENOSIS OF LUMBAR REGION WITH NEUROGENIC CLAUDICATION: ICD-10-CM

## 2024-12-11 DIAGNOSIS — M47.816 LUMBAR SPONDYLOSIS: ICD-10-CM

## 2024-12-11 DIAGNOSIS — M43.06 LUMBAR SPONDYLOLYSIS: ICD-10-CM

## 2024-12-11 DIAGNOSIS — M48.061 SPINAL STENOSIS OF LUMBAR REGION WITHOUT NEUROGENIC CLAUDICATION: ICD-10-CM

## 2024-12-11 DIAGNOSIS — F40.240 CLAUSTROPHOBIA: ICD-10-CM

## 2024-12-11 DIAGNOSIS — M48.061 SPINAL STENOSIS OF LUMBAR REGION, UNSPECIFIED WHETHER NEUROGENIC CLAUDICATION PRESENT: ICD-10-CM

## 2024-12-11 PROCEDURE — 3078F DIAST BP <80 MM HG: CPT | Performed by: CLINICAL NURSE SPECIALIST

## 2024-12-11 PROCEDURE — 99214 OFFICE O/P EST MOD 30 MIN: CPT | Performed by: CLINICAL NURSE SPECIALIST

## 2024-12-11 PROCEDURE — 3075F SYST BP GE 130 - 139MM HG: CPT | Performed by: CLINICAL NURSE SPECIALIST

## 2024-12-11 PROCEDURE — 1036F TOBACCO NON-USER: CPT | Performed by: CLINICAL NURSE SPECIALIST

## 2024-12-11 PROCEDURE — 1159F MED LIST DOCD IN RCRD: CPT | Performed by: CLINICAL NURSE SPECIALIST

## 2024-12-11 PROCEDURE — 1160F RVW MEDS BY RX/DR IN RCRD: CPT | Performed by: CLINICAL NURSE SPECIALIST

## 2024-12-11 PROCEDURE — 1157F ADVNC CARE PLAN IN RCRD: CPT | Performed by: CLINICAL NURSE SPECIALIST

## 2024-12-11 PROCEDURE — 1123F ACP DISCUSS/DSCN MKR DOCD: CPT | Performed by: CLINICAL NURSE SPECIALIST

## 2024-12-11 PROCEDURE — G2211 COMPLEX E/M VISIT ADD ON: HCPCS | Performed by: CLINICAL NURSE SPECIALIST

## 2024-12-11 RX ORDER — DULOXETIN HYDROCHLORIDE 30 MG/1
30 CAPSULE, DELAYED RELEASE ORAL DAILY
Qty: 90 CAPSULE | Refills: 0 | Status: SHIPPED | OUTPATIENT
Start: 2024-12-11 | End: 2025-03-11

## 2024-12-11 RX ORDER — DIAZEPAM 5 MG/1
5 TABLET ORAL SEE ADMIN INSTRUCTIONS
Qty: 2 TABLET | Refills: 0 | Status: SHIPPED | OUTPATIENT
Start: 2024-12-11 | End: 2024-12-12

## 2024-12-11 RX ORDER — DULOXETIN HYDROCHLORIDE 60 MG/1
60 CAPSULE, DELAYED RELEASE ORAL DAILY
Qty: 90 CAPSULE | Refills: 0 | Status: SHIPPED | OUTPATIENT
Start: 2024-12-11 | End: 2025-03-11

## 2024-12-11 RX ORDER — OXYCODONE HYDROCHLORIDE 5 MG/1
5 TABLET ORAL 2 TIMES DAILY PRN
Qty: 60 TABLET | Refills: 0 | Status: SHIPPED | OUTPATIENT
Start: 2024-12-16 | End: 2025-01-15

## 2024-12-11 RX ORDER — GABAPENTIN 100 MG/1
300 CAPSULE ORAL 3 TIMES DAILY
Qty: 810 CAPSULE | Refills: 0 | Status: SHIPPED | OUTPATIENT
Start: 2024-12-11 | End: 2025-03-11

## 2024-12-11 ASSESSMENT — ENCOUNTER SYMPTOMS
LOSS OF SENSATION IN FEET: 1
OCCASIONAL FEELINGS OF UNSTEADINESS: 1
DEPRESSION: 0

## 2024-12-11 NOTE — ASSESSMENT & PLAN NOTE
79-year-old female presents for follow-up of lower back pain with claudication symptoms.  Patient is planning to proceed with left hip surgery after the first of the year at the [facility].  She would also like further evaluation of her low back pain associated with lumbar stenosis with claudication symptoms.  She would like to discuss the possibility of a minimally invasive procedure to target her lumbar symptoms.  She states that it is very difficult for her to stand for any length of time or walk even a short distance without significant pain.  This pain is causing great debility and decreasing her functional status.  At this point, her MRI of her lumbar spine is approximately 2 years old.  We will send patient for an updated lumbar MRI and base further treatment decisions on results of imaging.  Explained to patient that it may be difficult to proceed with interventional procedure secondary to severe scoliosis.  Again reviewed that the gabapentin is most likely not increasing her lower extremity edema as her right lower extremity improved after vascular stent.  Recommended she follow-up with vascular surgery for further evaluation of left lower extremity edema.  She continues to do well with her current medication regimen.  Taking a low-dose of oxycodone 5 mg 1-2 times per day as needed.  Continues to tolerate her gabapentin 300 mg 3 times daily.  I would not recommend increasing this dose at this time until she has further evaluation by vascular surgery.  She may increase her Tylenol to arthritis strength Tylenol 650 mg 1-2 up to 2 times per day as needed. Plan reviewed with patient at today's visit.    -Sending patient for updated imaging in the form of a lumbar MRI for symptoms of lumbar stenosis with neurogenic claudication.  Patient's most recent MRI is approximately 2 years old.  Patient will require Valium 5 mg 1 to 2 tablets prior to MRI secondary to claustrophobia.  -Continue oxycodone 5 mg up to 2  times per day as needed for pain.  -Continue gabapentin 300 mg 3 times daily.  The patient was counseled on the risks and potential side effects of gabapentin as well as its importance for dosage titration. Side effects included but were not limited to, drowsiness, sedation, cognitive decline, peripheral edema, weight gain, seizures, with abrupt withdrawal.  Patient was instructed to call the office with any concerns or side effects before abruptly stopping medication.   -Continue duloxetine 90 mg daily.  -Patient may supplement with Tylenol arthritis strength 650 mg.  Advised patient to limit total Tylenol dose to less than 3000 mg in 24 hours.  -Patient will follow-up in our office in approximately 3 months for reevaluation.  At that time we will review her lumbar MRI and discuss further treatment options targeting lumbar stenosis/claudication.

## 2024-12-11 NOTE — PROGRESS NOTES
Subjective   Patient ID: Robyn Madera is a 79 y.o. female who presents for back pain and neuralgia      HPI    79-year-old female presents for follow-up of lower back/radicular pain as well as neuropathic pain.   Most recent MRI from 2023 consistent with significant scoliosis and central canal as well as foraminal stenosis.  Continues to experience lumbar radicular symptoms accompanied by symptoms of claudication.  Continues to experience neuropathic pain to bilateral feet.  She is endorsing more weakness noted in her left lower extremity which she relates to her lumbar stenosis as well as left hip pain.  Denies any acute changes in bowel/bladder function.  Also continues to experience widespread polyarticular pain. Currently experiencing increasing left hip pain and is anticipating left CARLOS EDUARDO after the first of the year at the [facility].  Patient also continues to experience lower extremity swelling left greater than right.  She is followed by vascular surgery and is status post stent placement on the right.  She has noted some improvement in right lower extremity swelling but continues to notice worsening left lower extremity swelling.  She is planning to follow-up with vascular surgery for further evaluation.  She completed a formal course of physical therapy targeting her gait and feels that this did provide some stability.  She does feel that her hip surgery may increase her stability.  She continues to do well with a low-dose of oxycodone 5 mg up to 2 times per day as needed.  Continued benefit with gabapentin 300 mg 3 times daily which she feels provides significant pain relief, however, she is concerned that it may be contributing to her lower extremity swelling.  Continued benefit with duloxetine 90 mg daily.  Supplements with Tylenol Extra Strength 500 mg 1 or 2/day.  She is requesting to increase her Tylenol to arthritis strength Tylenol for additional pain relief.  Continues home therapy  "exercises and stretches.  Patient asking if she could be a candidate for any minimally invasive surgical procedure such as MILD or Vertiflex targeting her chronic back pain.    Patient returns to the office for interval re-evaluation of \"lower back pain intermittent sciatic pain, periods of neuropathy to bilateral legs and feet. Generalized arthritic pain-worsens with cold. I need a left hip replacement.\"    Fell 6 weeks ago and has decided to get hip replacement with [facility] after the holidays/ Swelling in the left leg-states    8/9/24-right iliac stent only-Dr Tolbert    Pain Score: 5/10     Treatment: Oxycodone 5mg BID  Medication count: 8 pills left in rx bottle  Last doses taken: 12/11/24 1 dose and BID  Fill date: 11/16/24     Efficacy: 75-80% relief     Narcan: EXP. 12/2025     Other medications/neuromodulators: Gabapentin 300MG TID-refill , Cymbalta 30mg/60mg-refill needed [facility] #90 day supply/Tylenol     Injections and/or Procedures: none recent      Other: does home exercises                  OARRS:  No data recorded  I have personally reviewed the OARRS report for Robyn Madera. I have considered the risks of abuse, dependence, addiction and diversion    Is the patient prescribed a combination of a benzodiazepine and opioid?  No    Last Urine Drug Screen / ordered today: No  Recent Results (from the past 8760 hours)   Confirmation Opiate/Opioid/Benzo Prescription Compliance    Collection Time: 09/16/24 11:51 AM   Result Value Ref Range    Clonazepam <25 <25 ng/mL    7-Aminoclonazepam <25 <25 ng/mL    Alprazolam <25 <25 ng/mL    Alpha-Hydroxyalprazolam <25 <25 ng/mL    Midazolam <25 <25 ng/mL    Alpha-Hydroxymidazolam <25 <25 ng/mL    Chlordiazepoxide <25 <25 ng/mL    Diazepam <25 <25 ng/mL    Nordiazepam <25 <25 ng/mL    Temazepam <25 <25 ng/mL    Oxazepam <25 <25 ng/mL    Lorazepam <25 <25 ng/mL    Methadone <25 <25 ng/mL    EDDP <25 <25 ng/mL    6-Acetylmorphine <25 <25 ng/mL    Codeine <50 <50 " ng/mL    Hydrocodone <25 <25 ng/mL    Hydromorphone <25 <25 ng/mL    Morphine  <50 <50 ng/mL    Norhydrocodone <25 <25 ng/mL    Noroxycodone >1,000 (H) <25 ng/mL    Oxycodone 927 (H) <25 ng/mL    Oxymorphone 159 (H) <25 ng/mL    Fentanyl <2.5 <2.5 ng/mL    Norfentanyl <2.5 <2.5 ng/mL    Tramadol <50 <50 ng/mL    O-Desmethyltramadol <50 <50 ng/mL    Zolpidem <25 <25 ng/mL    Zolpidem Metabolite (ZCA) <25 <25 ng/mL   Screen Opiate/Opioid/Benzo Prescription Compliance    Collection Time: 09/16/24 11:51 AM   Result Value Ref Range    Creatinine, Urine Random 48.0 20.0 - 320.0 mg/dL    Amphetamine Screen, Urine Presumptive Negative Presumptive Negative    Barbiturate Screen, Urine Presumptive Negative Presumptive Negative    Cannabinoid Screen, Urine Presumptive Negative Presumptive Negative    Cocaine Metabolite Screen, Urine Presumptive Negative Presumptive Negative    PCP Screen, Urine Presumptive Negative Presumptive Negative   Tapentadol Confirmation, Urine    Collection Time: 09/16/24 11:51 AM   Result Value Ref Range    Tapentadol <25 <25 ng/mL    N-Desmethyltapentadol <25 <25 ng/mL   Buprenorphine Confirm,Urine    Collection Time: 09/16/24 11:51 AM   Result Value Ref Range    BUPRENORPHINE GLUC, URINE <5 ng/mL    BUPRENORPHINE ,URINE <2 ng/mL    NALOXONE, URINE <100 ng/mL    NORBUPRENORPHINE GLUC,URINE <5 ng/mL    NORBUPRENORPHINE, URINE <2 ng/mL     Results are as expected.     Controlled Substance Agreement:  Date of the Last Agreement: 9/16/24  Reviewed Controlled Substance Agreement including but not limited to the benefits, risks, and alternatives to treatment with a Controlled Substance medication(s).        Review of Systems      ROS:   General: No fevers, chills, weight loss  Skin: Negative for lesions  Eyes: No acute vision changes  Ears: No vertigo  Nose, mouth, throat: No difficulty swallowing or speaking  Respiratory: No cough, shortness of breath, cyanosis  Cardiovascular: Negative for chest pain  syncope or palpitation  Gastrointestinal: No constipation, nausea, vomiting  Neurological: Negative for headache, positive for: Paresthesia and weakness  Psychological: Negative for severe or debilitating anxiety, depression. Negative memory loss  Musculoskeletal: Positive for arthralgia, myalgia and pain  Endocrine: Negative for weight gain, appetite changes, excessive sweating  Allergy/immune: Negative      Current Outpatient Medications   Medication Instructions    acetaminophen (TYLENOL) 500 mg, Daily    amLODIPine (NORVASC) 10 mg, oral, Daily    aspirin 81 mg EC tablet 1 tablet, Daily    atorvastatin (LIPITOR) 40 mg, oral, Nightly    busPIRone (BUSPAR) 15 mg, oral, 2 times daily    calcium carbonate-vitamin D3 500 mg-3.125 mcg (125 unit) tablet tablet Take by mouth.    cyanocobalamin (VITAMIN B-12) 1,000 mcg, Daily    diazePAM (VALIUM) 5 mg, oral, Once    diclofenac sodium (VOLTAREN) 4 g, 4 times daily PRN    docusate sodium (Colace) 100 mg capsule 2 times daily    DULoxetine (CYMBALTA) 30 mg, oral, Daily, Do not crush or chew.  Patient will take duloxetine 30 mg and 60 mg capsule daily for a total dose of 90 mg daily.   Please hold until fill date    DULoxetine (CYMBALTA) 60 mg, oral, Daily, Do not crush or chew.  Will take duloxetine 60 mg and 30 mg capsule daily for a total dose of 90 mg daily.  Please hold until fill date    esomeprazole (NexIUM) 20 mg DR capsule 1 capsule, Daily    fesoterodine (TOVIAZ) 4 mg, oral, Daily    gabapentin (NEURONTIN) 300 mg, oral, 3 times daily    lactase (LACTAID) 3,000 Units, 3 times daily PRN    lubiprostone (AMITIZA) 8 mcg, oral, 2 times daily (morning and late afternoon), Take with meals    mirabegron (MYRBETRIQ) 50 mg, oral, Daily    omeprazole OTC (PRILOSEC OTC) 20 mg, Daily before breakfast    oxyCODONE (ROXICODONE) 5 mg, oral, 2 times daily PRN    polyethylene glycol (Glycolax) 17 gram/dose powder Every other day        Past Medical History:   Diagnosis Date     Abnormal findings on diagnostic imaging of heart and coronary circulation 11/07/2019    Abnormal Heart Score CT    Abnormal levels of other serum enzymes 10/24/2019    Abnormal cardiac enzyme level    Allergic rhinitis due to animal hair or dander 10/28/2013    Anxiety 2023    Arthritis 2016    Chronic constipation 2022    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/2014    Disease of thyroid gland 2018    Diverticulosis of colon 06/17/2013    Diverticulosis of intestine, part unspecified, without perforation or abscess without bleeding     Diverticulosis    Dizziness     positional    Gastric polyp 06/23/2014    GERD (gastroesophageal reflux disease) 2008    Glaucoma suspect, both eyes 01/08/2015    HL (hearing loss) 2022    Hx of squamous cell carcinoma of skin 06/09/2015    Hypertension 2015    Internal hemorrhoid 03/21/2016    Irregular heart beat     Low TSH level 03/10/2023    Lung nodule, solitary 03/10/2023    Lung nodule, solitary 03/10/2023    Stable CT 6/23  No follow up needed    Mild intermittent asthma without complication ([facility]) 02/24/2016    Other microscopic hematuria 03/01/2018    Microscopic hematuria    Other specified abnormal findings of blood chemistry 10/19/2021    Elevated LFTs    Other specified abnormal findings of blood chemistry 10/19/2021    Elevated LFTs    Pain in right knee     Right knee pain    Personal history of other diseases of the digestive system     History of hiatal hernia    Personal history of other diseases of the musculoskeletal system and connective tissue 01/17/2020    History of osteopenia    Personal history of other diseases of the musculoskeletal system and connective tissue     History of herniated intervertebral disc    Personal history of other diseases of the musculoskeletal system and connective tissue     History of chronic back pain    Personal history of other diseases of the nervous system and sense organs     History of cataract    Personal  history of other diseases of the nervous system and sense organs     History of glaucoma    Personal history of other diseases of the respiratory system     History of allergic rhinitis    Personal history of other endocrine, nutritional and metabolic disease     History of hyperlipidemia    Personal history of other endocrine, nutritional and metabolic disease     History of Graves' disease    Personal history of other endocrine, nutritional and metabolic disease     History of hypoglycemia    Personal history of other malignant neoplasm of skin     History of malignant neoplasm of skin    Personal history of other mental and behavioral disorders     History of anxiety    Presence of right artificial knee joint 09/21/2017    Scoliosis 2008    Spinal stenosis     Vision loss     Visual impairment 2008    Wears partial dentures     upper    Wrist sprain 03/10/2023        Past Surgical History:   Procedure Laterality Date    HIP ARTHROPLASTY      right    JOINT REPLACEMENT Bilateral 2017-18    knees    KNEE ARTHROSCOPY W/ DEBRIDEMENT  07/09/2016    Arthroscopy Knee    OTHER SURGICAL HISTORY  10/14/2019    Excision of squamous cell carcinoma        Family History   Problem Relation Name Age of Onset    Diabetes Mother Jaci Bingham         Allergies   Allergen Reactions    House Dust Hives    Adhesive Tape-Silicones Other     ADHESIVES CAUSES BLISTERS    Cat Dander Unknown    Penicillins Hives        MR LUMBAR SPINE WO IV CONTRAST 01/25/2023    Narrative  MRN: [id number]  Patient Name: CHELY CALL    STUDY:  MRI L-SPINE WO;  1/25/2023 4:22 pm    INDICATION:  worsening low back/radicular pain with LE weakness  M48.061: Spinal  stenosis, lumbar.    COMPARISON:  MRI lumbar spine 12/10/2019    ACCESSION NUMBER(S):  [id number]    ORDERING CLINICIAN:  CHRISTOFER REYES    TECHNIQUE:  Sagittal T1, T2, STIR, axial T1 and T2 weighted images of the lumbar  spine were acquired.    FINDINGS:  Alignment: Straightening of the lumbar  lordosis. Moderate  levoscoliosis centered at L1-L2.    Vertebrae/Intervertebral Discs: The vertebral bodies demonstrate  expected height.The marrow signal is within normal limits. Multilevel  loss of disc space throughout the lumbar spine.    Conus: The lower thoracic cord appears unremarkable. The conus  terminates at L1.    Multilevel degenerative changes of the lumbar spine including  endplate remodeling, disc bulges, ligamentum flavum hypertrophy facet  degenerative changes and vacuum phenomenon.    T12-L1: Disc bulge asymmetric to the left, ligamentum flavum  hypertrophy and facet degenerative changes with mild canal stenosis.  Mild bilateral neural foraminal narrowing.    L1-2: Disc bulge asymmetric to the right, ligamentum flavum  hypertrophy and facet degenerative changes with mild canal stenosis.  Right subarticular recess narrowing. Mild right neural foraminal  narrowing. Left neural foramen is patent.    L2-3: Disc bulge asymmetric to the right, ligamentum flavum  hypertrophy and facet degenerative changes with mild canal stenosis.  Right subarticular recess narrowing. Moderate right neural foraminal  narrowing. Left neural foramen is patent.    L3-4: Disc bulge asymmetric to the right, ligamentum flavum  hypertrophy and facet degenerative change with moderate canal  stenosis. Right-greater-than-left subarticular recess narrowing.  Moderate bilateral neural foraminal narrowing.    L4-5: Disc bulge asymmetric to the left, ligamentum flavum  hypertrophy and facet degenerative change with moderate canal  stenosis. Bilateral subarticular recess narrowing. Moderate left and  mild right neural foraminal narrowing.    L5-S1: Disc bulge asymmetric to the left, ligamentum flavum  hypertrophy and facet degenerative changes with mild canal stenosis.  Left-greater-than-right subarticular recess narrowing.  Right-greater-than-left moderate neural foraminal narrowing.    The prevertebral and posterior paraspinous soft  tissues are  unremarkable.    Impression  1.  Moderate levoscoliosis centered at L1-L2.  2. Advanced multilevel degenerative changes of the lumbar spine with  up to moderate canal stenosis and neural foraminal narrowing, most  prominent at L3-L4 and L4-L5.  3. Multilevel subarticular recess narrowing.      Objective     There were no vitals filed for this visit.            Physical Exam    GENERAL EXAM  Vital Signs: Vital signs to include heart rate, respiration rate, blood pressure, and temperature were reviewed.  General Appearance:  Awake, alert, healthy appearing, well developed, No acute distress.  Head: Normocephalic without evidence of head injury.  Neck: The appearance of the neck was normal without swelling with a midline trachea.  Eyes: The eyelids and eyebrows exhibited no abnormalities.  Pupils were not pin-point.  Sclera was without icterus.  Lungs: Respiration rhythm and depth was normal.  Respiratory movements were normal without labored breathing.  Cardiovascular: No peripheral edema was present, 1+ pitting edema right lower extremity from mid calf distally to her foot.  2+ pitting edema left lower extremity from mid calf distally to her foot.    Spine, lumbar: The patient is able to rise from seated to standing position with some hesitancy.  Gait is slow and deliberate.  Posture slightly forward leaning.  Ambulates with assistance of a cane.  Tenderness to paraspinous musculature lower lumbar region.  Flexion intact with extension more limited.  Neurological: Patient was oriented to time, place, and person.  Speech was normal.  Balance, gait, and stance were unremarkable.    Psychiatric: Appearance was normal with appropriate dress.  Mood was euthymic and affect was normal.  Skin: Affected regions were without ecchymosis or skin lesions.          Assessment/Plan   Problem List Items Addressed This Visit             ICD-10-CM    Lumbar spondylolysis M43.06    Relevant Orders    MR lumbar spine wo IV  contrast    Spinal stenosis of lumbar region M48.061    Relevant Medications    oxyCODONE (Roxicodone) 5 mg immediate release tablet (Start on 12/16/2024)    gabapentin (Neurontin) 100 mg capsule    Other Relevant Orders    MR lumbar spine wo IV contrast    Chronic lumbar radiculopathy - Primary M54.16     79-year-old female presents for follow-up of lower back pain with claudication symptoms.  Patient is planning to proceed with left hip surgery after the first of the year at the [facility].  She would also like further evaluation of her low back pain associated with lumbar stenosis with claudication symptoms.  She would like to discuss the possibility of a minimally invasive procedure to target her lumbar symptoms.  She states that it is very difficult for her to stand for any length of time or walk even a short distance without significant pain.  This pain is causing great debility and decreasing her functional status.  At this point, her MRI of her lumbar spine is approximately 2 years old.  We will send patient for an updated lumbar MRI and base further treatment decisions on results of imaging.  Explained to patient that it may be difficult to proceed with interventional procedure secondary to severe scoliosis.  Again reviewed that the gabapentin is most likely not increasing her lower extremity edema as her right lower extremity improved after vascular stent.  Recommended she follow-up with vascular surgery for further evaluation of left lower extremity edema.  She continues to do well with her current medication regimen.  Taking a low-dose of oxycodone 5 mg 1-2 times per day as needed.  Continues to tolerate her gabapentin 300 mg 3 times daily.  I would not recommend increasing this dose at this time until she has further evaluation by vascular surgery.  She may increase her Tylenol to arthritis strength Tylenol 650 mg 1-2 up to 2 times per day as needed. Plan reviewed with patient at today's  visit.    -Sending patient for updated imaging in the form of a lumbar MRI for symptoms of lumbar stenosis with neurogenic claudication.  Patient's most recent MRI is approximately 2 years old.  Patient will require Valium 5 mg 1 to 2 tablets prior to MRI secondary to claustrophobia.  -Continue oxycodone 5 mg up to 2 times per day as needed for pain.  -Continue gabapentin 300 mg 3 times daily.  The patient was counseled on the risks and potential side effects of gabapentin as well as its importance for dosage titration. Side effects included but were not limited to, drowsiness, sedation, cognitive decline, peripheral edema, weight gain, seizures, with abrupt withdrawal.  Patient was instructed to call the office with any concerns or side effects before abruptly stopping medication.   -Continue duloxetine 90 mg daily.  -Patient may supplement with Tylenol arthritis strength 650 mg.  Advised patient to limit total Tylenol dose to less than 3000 mg in 24 hours.  -Patient will follow-up in our office in approximately 3 months for reevaluation.  At that time we will review her lumbar MRI and discuss further treatment options targeting lumbar stenosis/claudication.         Relevant Orders    MR lumbar spine wo IV contrast    Long term (current) use of opiate analgesic Z79.891     Other Visit Diagnoses         Codes    Lumbar radiculopathy     M54.16    Relevant Medications    oxyCODONE (Roxicodone) 5 mg immediate release tablet (Start on 12/16/2024)    Lumbar spondylosis     M47.816    Relevant Medications    oxyCODONE (Roxicodone) 5 mg immediate release tablet (Start on 12/16/2024)    Neuropathic pain     M79.2    Relevant Medications    DULoxetine (Cymbalta) 60 mg DR capsule    DULoxetine (Cymbalta) 30 mg DR capsule    Claustrophobia     F40.240    Relevant Medications    diazePAM (Valium) 5 mg tablet                   This note was generated with the aid of dictation software, there may be typos despite my attempts at  proofreading.

## 2024-12-12 ENCOUNTER — HOSPITAL ENCOUNTER (OUTPATIENT)
Dept: RADIOLOGY | Facility: CLINIC | Age: 79
Discharge: HOME | End: 2024-12-12
Payer: MEDICARE

## 2024-12-12 DIAGNOSIS — R93.1 AGATSTON CAC SCORE, >400: ICD-10-CM

## 2024-12-12 PROCEDURE — 75571 CT HRT W/O DYE W/CA TEST: CPT

## 2024-12-13 ENCOUNTER — APPOINTMENT (OUTPATIENT)
Dept: CARDIOLOGY | Facility: HOSPITAL | Age: 79
End: 2024-12-13
Payer: MEDICARE

## 2024-12-17 ENCOUNTER — APPOINTMENT (OUTPATIENT)
Dept: UROLOGY | Facility: CLINIC | Age: 79
End: 2024-12-17
Payer: MEDICARE

## 2024-12-17 VITALS — SYSTOLIC BLOOD PRESSURE: 155 MMHG | RESPIRATION RATE: 16 BRPM | HEART RATE: 84 BPM | DIASTOLIC BLOOD PRESSURE: 82 MMHG

## 2024-12-17 DIAGNOSIS — N39.0 RECURRENT UTI: ICD-10-CM

## 2024-12-17 DIAGNOSIS — R31.21 ASYMPTOMATIC MICROSCOPIC HEMATURIA: Primary | ICD-10-CM

## 2024-12-17 PROCEDURE — 3077F SYST BP >= 140 MM HG: CPT | Performed by: STUDENT IN AN ORGANIZED HEALTH CARE EDUCATION/TRAINING PROGRAM

## 2024-12-17 PROCEDURE — 1157F ADVNC CARE PLAN IN RCRD: CPT | Performed by: STUDENT IN AN ORGANIZED HEALTH CARE EDUCATION/TRAINING PROGRAM

## 2024-12-17 PROCEDURE — 3079F DIAST BP 80-89 MM HG: CPT | Performed by: STUDENT IN AN ORGANIZED HEALTH CARE EDUCATION/TRAINING PROGRAM

## 2024-12-17 PROCEDURE — G2211 COMPLEX E/M VISIT ADD ON: HCPCS | Performed by: STUDENT IN AN ORGANIZED HEALTH CARE EDUCATION/TRAINING PROGRAM

## 2024-12-17 PROCEDURE — 99214 OFFICE O/P EST MOD 30 MIN: CPT | Performed by: STUDENT IN AN ORGANIZED HEALTH CARE EDUCATION/TRAINING PROGRAM

## 2024-12-17 PROCEDURE — 1159F MED LIST DOCD IN RCRD: CPT | Performed by: STUDENT IN AN ORGANIZED HEALTH CARE EDUCATION/TRAINING PROGRAM

## 2024-12-17 PROCEDURE — 1123F ACP DISCUSS/DSCN MKR DOCD: CPT | Performed by: STUDENT IN AN ORGANIZED HEALTH CARE EDUCATION/TRAINING PROGRAM

## 2024-12-17 ASSESSMENT — COLUMBIA-SUICIDE SEVERITY RATING SCALE - C-SSRS
2. HAVE YOU ACTUALLY HAD ANY THOUGHTS OF KILLING YOURSELF?: NO
6. HAVE YOU EVER DONE ANYTHING, STARTED TO DO ANYTHING, OR PREPARED TO DO ANYTHING TO END YOUR LIFE?: NO
1. IN THE PAST MONTH, HAVE YOU WISHED YOU WERE DEAD OR WISHED YOU COULD GO TO SLEEP AND NOT WAKE UP?: NO

## 2024-12-17 ASSESSMENT — ENCOUNTER SYMPTOMS
LOSS OF SENSATION IN FEET: 0
DEPRESSION: 0
OCCASIONAL FEELINGS OF UNSTEADINESS: 1

## 2024-12-17 NOTE — PROGRESS NOTES
HISTORY OF PRESENT ILLNESS:  Robyn Madera is a 79 y.o. female who presents today for a follow up visit.           Past Medical History  She has a past medical history of Abnormal findings on diagnostic imaging of heart and coronary circulation (11/07/2019), Abnormal levels of other serum enzymes (10/24/2019), Allergic rhinitis due to animal hair or dander (10/28/2013), Anxiety (2023), Arthritis (2016), Chronic constipation (2022), Combined form of senile cataract of both eyes (10/29/2014), Disease of thyroid gland (2018), Diverticulosis of colon (06/17/2013), Diverticulosis of intestine, part unspecified, without perforation or abscess without bleeding, Dizziness, Gastric polyp (06/23/2014), GERD (gastroesophageal reflux disease) (2008), Glaucoma suspect, both eyes (01/08/2015), HL (hearing loss) (2022), squamous cell carcinoma of skin (06/09/2015), Hypertension (2015), Internal hemorrhoid (03/21/2016), Irregular heart beat, Low TSH level (03/10/2023), Lung nodule, solitary (03/10/2023), Lung nodule, solitary (03/10/2023), Mild intermittent asthma without complication (HHS-HCC) (02/24/2016), Other microscopic hematuria (03/01/2018), Other specified abnormal findings of blood chemistry (10/19/2021), Other specified abnormal findings of blood chemistry (10/19/2021), Pain in right knee, Personal history of other diseases of the digestive system, Personal history of other diseases of the musculoskeletal system and connective tissue (01/17/2020), Personal history of other diseases of the musculoskeletal system and connective tissue, Personal history of other diseases of the musculoskeletal system and connective tissue, Personal history of other diseases of the nervous system and sense organs, Personal history of other diseases of the nervous system and sense organs, Personal history of other diseases of the respiratory system, Personal history of other endocrine, nutritional and metabolic disease, Personal history  "of other endocrine, nutritional and metabolic disease, Personal history of other endocrine, nutritional and metabolic disease, Personal history of other malignant neoplasm of skin, Personal history of other mental and behavioral disorders, Presence of right artificial knee joint (09/21/2017), Scoliosis (2008), Spinal stenosis, Vision loss, Visual impairment (2008), Wears partial dentures, and Wrist sprain (03/10/2023).    Surgical History  She has a past surgical history that includes Knee arthroscopy w/ debridement (07/09/2016); Other surgical history (10/14/2019); Joint replacement (Bilateral, 2017-18); and Hip Arthroplasty.     Social History  She reports that she has never smoked. She has never used smokeless tobacco. She reports that she does not currently use alcohol. She reports that she does not use drugs.    Family History  Family History   Problem Relation Name Age of Onset    Diabetes Mother Jaci Bingham         Allergies  House dust, Adhesive tape-silicones, Cat dander, and Penicillins      A comprehensive 10+ review of systems was negative except for: see hpi                          PHYSICAL EXAMINATION:  BP Readings from Last 3 Encounters:   12/11/24 136/71   12/10/24 138/70   12/02/24 136/78      Wt Readings from Last 3 Encounters:   12/11/24 70.3 kg (155 lb)   12/10/24 70.6 kg (155 lb 9.6 oz)   12/02/24 72.1 kg (159 lb)      BMI: Estimated body mass index is 25.02 kg/m² as calculated from the following:    Height as of 12/11/24: 1.676 m (5' 6\").    Weight as of 12/11/24: 70.3 kg (155 lb).  BSA: Estimated body surface area is 1.81 meters squared as calculated from the following:    Height as of 12/11/24: 1.676 m (5' 6\").    Weight as of 12/11/24: 70.3 kg (155 lb).  HEENT: Normocephalic, atraumatic, PER EOMI, nonicteric, trachea normal, thyroid normal, oropharynx normal.  CARDIAC: regular rate & rhythm, S1 & S2 normal.  No heaves, thrills, gallops or murmurs.  LUNGS: Clear to auscultation, no spinal " or CV tenderness.  EXTREMITIES: No evidence of cyanosis, clubbing or edema.               Assessment:  78 yo with microscopic hematuria and OAB      Nocturia/OAB  -improved with gemtesa, but too expensive, wants to stop and   Is noticing some improvement with Myrbetriq   clinical pharmacy referral   We discussed Vyvally device, may be interested  Discussed changing medication, or adding another medication  Rx fesoterodine in addition to myrbetriq   Can take both medications at bed time         Microhematuria:   Negative work-up, repeat UA ordered 12/17/24      Follow up in 3 to 4 months        All questions and concerns were answered and addressed.  The patient expressed understanding and agrees with the plan.     Gino Haas MD    Scribe Attestation  By signing my name below, I, Sera Vazquez, Scribdaniel   attest that this documentation has been prepared under the direction and in the presence of Gino Haas MD.

## 2024-12-19 ENCOUNTER — TELEPHONE (OUTPATIENT)
Dept: CARDIOLOGY | Facility: HOSPITAL | Age: 79
End: 2024-12-19
Payer: MEDICARE

## 2024-12-19 DIAGNOSIS — I10 BENIGN ESSENTIAL HYPERTENSION: ICD-10-CM

## 2024-12-19 DIAGNOSIS — I49.3 PVC'S (PREMATURE VENTRICULAR CONTRACTIONS): ICD-10-CM

## 2024-12-19 DIAGNOSIS — R93.1 ELEVATED CORONARY ARTERY CALCIUM SCORE: ICD-10-CM

## 2024-12-19 DIAGNOSIS — R93.89 ABNORMAL CT SCAN: Primary | ICD-10-CM

## 2024-12-19 DIAGNOSIS — E78.5 DYSLIPIDEMIA: ICD-10-CM

## 2024-12-19 NOTE — TELEPHONE ENCOUNTER
RN called pt at this time regarding ABN results and plan. No answer at this time. RN left message for patient to call back, order was placed.      ----- Message from Carl William sent at 12/19/2024  8:58 AM EST -----  Her CT calcium score has increased from 2021 - We should get a Dobutamine stress echo    [de-identified] : 48-year-old male continued numbness and tingling the fingers id he may be a little bit better.  But he still waking up with numbness and tingling at times in the fingers it still disturbing his sleep.  Still not working.

## 2024-12-20 ENCOUNTER — TELEPHONE (OUTPATIENT)
Dept: CARDIOLOGY | Facility: HOSPITAL | Age: 79
End: 2024-12-20
Payer: MEDICARE

## 2024-12-20 NOTE — TELEPHONE ENCOUNTER
RN called pt with results and plan. PT verbalized understanding and RN sent pt over to central scheduling.         ----- Message from Carl William sent at 12/19/2024  8:58 AM EST -----  Her CT calcium score has increased from 2021 - We should get a Dobutamine stress echo

## 2024-12-31 ENCOUNTER — HOSPITAL ENCOUNTER (OUTPATIENT)
Dept: RADIOLOGY | Facility: CLINIC | Age: 79
Discharge: HOME | End: 2024-12-31
Payer: MEDICARE

## 2024-12-31 DIAGNOSIS — M43.06 LUMBAR SPONDYLOLYSIS: ICD-10-CM

## 2024-12-31 DIAGNOSIS — M54.16 CHRONIC LUMBAR RADICULOPATHY: ICD-10-CM

## 2024-12-31 DIAGNOSIS — M48.061 SPINAL STENOSIS OF LUMBAR REGION WITHOUT NEUROGENIC CLAUDICATION: ICD-10-CM

## 2024-12-31 PROCEDURE — 72148 MRI LUMBAR SPINE W/O DYE: CPT | Performed by: RADIOLOGY

## 2024-12-31 PROCEDURE — 72148 MRI LUMBAR SPINE W/O DYE: CPT

## 2025-01-02 ENCOUNTER — HOSPITAL ENCOUNTER (OUTPATIENT)
Dept: VASCULAR MEDICINE | Facility: HOSPITAL | Age: 80
Discharge: HOME | End: 2025-01-02
Payer: MEDICARE

## 2025-01-02 DIAGNOSIS — M79.605 PAIN IN LEFT LEG: ICD-10-CM

## 2025-01-02 DIAGNOSIS — I87.2 VENOUS INSUFFICIENCY: ICD-10-CM

## 2025-01-02 DIAGNOSIS — M79.89 OTHER SPECIFIED SOFT TISSUE DISORDERS: ICD-10-CM

## 2025-01-02 PROCEDURE — 93971 EXTREMITY STUDY: CPT

## 2025-01-02 PROCEDURE — 93971 EXTREMITY STUDY: CPT | Performed by: INTERNAL MEDICINE

## 2025-01-07 ENCOUNTER — HOSPITAL ENCOUNTER (OUTPATIENT)
Dept: CARDIOLOGY | Facility: HOSPITAL | Age: 80
Discharge: HOME | End: 2025-01-07
Payer: MEDICARE

## 2025-01-07 VITALS — BODY MASS INDEX: 25.62 KG/M2 | WEIGHT: 158.73 LBS

## 2025-01-07 DIAGNOSIS — I49.3 PVC'S (PREMATURE VENTRICULAR CONTRACTIONS): ICD-10-CM

## 2025-01-07 PROCEDURE — 93017 CV STRESS TEST TRACING ONLY: CPT

## 2025-01-07 PROCEDURE — 2500000004 HC RX 250 GENERAL PHARMACY W/ HCPCS (ALT 636 FOR OP/ED): Performed by: INTERNAL MEDICINE

## 2025-01-07 PROCEDURE — 93016 CV STRESS TEST SUPVJ ONLY: CPT | Performed by: INTERNAL MEDICINE

## 2025-01-07 PROCEDURE — 93018 CV STRESS TEST I&R ONLY: CPT | Performed by: INTERNAL MEDICINE

## 2025-01-07 PROCEDURE — 93350 STRESS TTE ONLY: CPT | Performed by: INTERNAL MEDICINE

## 2025-01-07 RX ORDER — METOPROLOL TARTRATE 1 MG/ML
5 INJECTION, SOLUTION INTRAVENOUS ONCE
Status: COMPLETED | OUTPATIENT
Start: 2025-01-07 | End: 2025-01-07

## 2025-01-07 RX ADMIN — PERFLUTREN 2 ML OF DILUTION: 6.52 INJECTION, SUSPENSION INTRAVENOUS at 14:10

## 2025-01-07 RX ADMIN — DOBUTAMINE 40 MCG/KG/MIN: 12.5 INJECTION, SOLUTION, CONCENTRATE INTRAVENOUS at 14:08

## 2025-01-07 RX ADMIN — METOPROLOL TARTRATE 5 MG: 5 INJECTION INTRAVENOUS at 14:06

## 2025-01-14 ENCOUNTER — OFFICE VISIT (OUTPATIENT)
Dept: VASCULAR SURGERY | Facility: HOSPITAL | Age: 80
End: 2025-01-14
Payer: MEDICARE

## 2025-01-14 VITALS
HEART RATE: 121 BPM | SYSTOLIC BLOOD PRESSURE: 169 MMHG | DIASTOLIC BLOOD PRESSURE: 85 MMHG | WEIGHT: 163 LBS | BODY MASS INDEX: 26.31 KG/M2

## 2025-01-14 DIAGNOSIS — I87.1 MAY-THURNER SYNDROME: Primary | ICD-10-CM

## 2025-01-14 DIAGNOSIS — M47.816 LUMBAR SPONDYLOSIS: ICD-10-CM

## 2025-01-14 DIAGNOSIS — M54.16 LUMBAR RADICULOPATHY: ICD-10-CM

## 2025-01-14 DIAGNOSIS — M48.061 SPINAL STENOSIS OF LUMBAR REGION, UNSPECIFIED WHETHER NEUROGENIC CLAUDICATION PRESENT: ICD-10-CM

## 2025-01-14 PROCEDURE — 3077F SYST BP >= 140 MM HG: CPT | Performed by: SURGERY

## 2025-01-14 PROCEDURE — 1157F ADVNC CARE PLAN IN RCRD: CPT | Performed by: SURGERY

## 2025-01-14 PROCEDURE — 99213 OFFICE O/P EST LOW 20 MIN: CPT | Performed by: SURGERY

## 2025-01-14 PROCEDURE — 1159F MED LIST DOCD IN RCRD: CPT | Performed by: SURGERY

## 2025-01-14 PROCEDURE — 1123F ACP DISCUSS/DSCN MKR DOCD: CPT | Performed by: SURGERY

## 2025-01-14 PROCEDURE — 1036F TOBACCO NON-USER: CPT | Performed by: SURGERY

## 2025-01-14 PROCEDURE — 3079F DIAST BP 80-89 MM HG: CPT | Performed by: SURGERY

## 2025-01-14 RX ORDER — OXYCODONE HYDROCHLORIDE 5 MG/1
5 TABLET ORAL 2 TIMES DAILY PRN
Qty: 60 TABLET | Refills: 0 | Status: SHIPPED | OUTPATIENT
Start: 2025-01-16 | End: 2025-02-15

## 2025-01-14 NOTE — PROGRESS NOTES
Subjective   Patient ID: Robyn Madera is a 80 y.o. female who presents for Follow-up (Results/VI ).  HPI  Patient is follow-up secondary to recent VI study  Left lower extremity pretibial ulcerations are completely healed  Patient does state continued swelling however in her left lower extremity  No significant pain or discomfort she is otherwise active and amatory    Review of Systems    Objective   Physical Exam  Physical exam    Constitutional: alert and in no acute distress verbal  Eyes: No erythema swelling or discharge noted  Neck: supple, symmetric, trachea midline, no masses noted  Cardiovascular: Carotid pulses 2+, no obvious bruit, no Jugular distension noted, no thrill, heart regular rate, lower extremity vascular exam intact, cap refill <2 sec  Pulmonary:  Bilateral breath sounds intact, clear with rales rhonchi or wheeze  Abdomen: soft non tender, no pulsatile masses noted, no rebound rigidity or guarding noted  Skin: intact warm no abnormal turgor  Psychiatric: alert without any obvious cognitive issues, oriented to person, place, and time  1+ pretibial edema bilateral extremity left slightly worse than right    Assessment/Plan   May Thurner syndrome  VI study reviewed with patient suggesting to some deep venous insufficiency  Will check CT venogram abdomen pelvis for further evaluation of left iliac stent  Follow-up after testing         Jagjit Tolbert DO 01/14/25 1:54 PM

## 2025-01-14 NOTE — TELEPHONE ENCOUNTER
Requested refill for Oxycodone 5mg BID #60 for 30 days.  LV: 12/11/24  NV: 3/10/25  MARARRS LFD: 12/17/24  Last SD: 12/16/24  Next SD: 1/16/25  Awa Waddell

## 2025-01-17 ENCOUNTER — LAB (OUTPATIENT)
Dept: LAB | Facility: LAB | Age: 80
End: 2025-01-17
Payer: MEDICARE

## 2025-01-17 DIAGNOSIS — I87.1 MAY-THURNER SYNDROME: ICD-10-CM

## 2025-01-17 LAB
ANION GAP SERPL CALC-SCNC: 13 MMOL/L (ref 10–20)
BUN SERPL-MCNC: 15 MG/DL (ref 6–23)
CALCIUM SERPL-MCNC: 9.3 MG/DL (ref 8.6–10.3)
CHLORIDE SERPL-SCNC: 102 MMOL/L (ref 98–107)
CO2 SERPL-SCNC: 30 MMOL/L (ref 21–32)
CREAT SERPL-MCNC: 0.82 MG/DL (ref 0.5–1.05)
EGFRCR SERPLBLD CKD-EPI 2021: 73 ML/MIN/1.73M*2
GLUCOSE SERPL-MCNC: 105 MG/DL (ref 74–99)
POTASSIUM SERPL-SCNC: 3.8 MMOL/L (ref 3.5–5.3)
SODIUM SERPL-SCNC: 141 MMOL/L (ref 136–145)

## 2025-01-17 PROCEDURE — 80048 BASIC METABOLIC PNL TOTAL CA: CPT

## 2025-02-04 ENCOUNTER — HOSPITAL ENCOUNTER (OUTPATIENT)
Dept: RADIOLOGY | Facility: CLINIC | Age: 80
Discharge: HOME | End: 2025-02-04
Payer: MEDICARE

## 2025-02-04 ENCOUNTER — APPOINTMENT (OUTPATIENT)
Dept: RADIOLOGY | Facility: HOSPITAL | Age: 80
End: 2025-02-04
Payer: MEDICARE

## 2025-02-04 PROCEDURE — 74177 CT ABD & PELVIS W/CONTRAST: CPT | Performed by: STUDENT IN AN ORGANIZED HEALTH CARE EDUCATION/TRAINING PROGRAM

## 2025-02-04 PROCEDURE — 74177 CT ABD & PELVIS W/CONTRAST: CPT

## 2025-02-04 PROCEDURE — 2550000001 HC RX 255 CONTRASTS: Performed by: SURGERY

## 2025-02-04 RX ADMIN — IOHEXOL 75 ML: 350 INJECTION, SOLUTION INTRAVENOUS at 13:14

## 2025-02-11 ENCOUNTER — TELEPHONE (OUTPATIENT)
Dept: CARDIOLOGY | Facility: HOSPITAL | Age: 80
End: 2025-02-11
Payer: MEDICARE

## 2025-02-11 DIAGNOSIS — R94.31 ABNORMAL ELECTROCARDIOGRAM (ECG) (EKG): ICD-10-CM

## 2025-02-11 DIAGNOSIS — R93.1 AGATSTON CAC SCORE, >400: Primary | ICD-10-CM

## 2025-02-11 NOTE — TELEPHONE ENCOUNTER
LVM for patient to return call.    Results:  Summary:   1. The resting ejection fraction was estimated at 55 to 60% with a peak exercise ejection fraction estimated at 60 to 65%.   2. Normal global left ventricular systolic function.   3. Adequate level of stress achieved.   4. No clinical or electrocardiographic evidence for ischemia at maximal infusion.   5. No ECG changes from baseline.   6. There were no stress-induced wall motion abnormalities. This is a negative stress echo test for ischemia.

## 2025-02-11 NOTE — TELEPHONE ENCOUNTER
Pt called to request results from her stress echo performed 1/7. Pt states she has not yet received a call for results and would greatly appreciate a call back at [phone]. Routing to care team for further assistance.

## 2025-02-14 DIAGNOSIS — M48.061 SPINAL STENOSIS OF LUMBAR REGION, UNSPECIFIED WHETHER NEUROGENIC CLAUDICATION PRESENT: ICD-10-CM

## 2025-02-14 DIAGNOSIS — M54.16 LUMBAR RADICULOPATHY: ICD-10-CM

## 2025-02-14 DIAGNOSIS — M47.816 LUMBAR SPONDYLOSIS: ICD-10-CM

## 2025-02-14 RX ORDER — OXYCODONE HYDROCHLORIDE 5 MG/1
5 TABLET ORAL 2 TIMES DAILY PRN
Qty: 60 TABLET | Refills: 0 | Status: SHIPPED | OUTPATIENT
Start: 2025-02-15 | End: 2025-03-17

## 2025-02-14 NOTE — TELEPHONE ENCOUNTER
Requested refill for Oxycodone 5 mg BID #60 for 30 days.  LV: 12/11/24  NV: 3/10/25  OARRS LFD:1/16/25  Last SD: 1/16/25  Next SD: 1/15/25  Awa Waddell

## 2025-02-18 ENCOUNTER — TELEPHONE (OUTPATIENT)
Dept: ENDOCRINOLOGY | Facility: CLINIC | Age: 80
End: 2025-02-18
Payer: MEDICARE

## 2025-02-18 DIAGNOSIS — E05.90 SUBCLINICAL HYPERTHYROIDISM: Primary | ICD-10-CM

## 2025-02-25 ENCOUNTER — OFFICE VISIT (OUTPATIENT)
Dept: VASCULAR SURGERY | Facility: HOSPITAL | Age: 80
End: 2025-02-25
Payer: MEDICARE

## 2025-02-25 VITALS
WEIGHT: 156 LBS | SYSTOLIC BLOOD PRESSURE: 173 MMHG | BODY MASS INDEX: 25.18 KG/M2 | DIASTOLIC BLOOD PRESSURE: 76 MMHG | HEART RATE: 125 BPM

## 2025-02-25 DIAGNOSIS — I87.2 VENOUS INSUFFICIENCY: Primary | ICD-10-CM

## 2025-02-25 LAB
T3FREE SERPL-MCNC: 3.7 PG/ML (ref 2.3–4.2)
T4 FREE SERPL-MCNC: 1.3 NG/DL (ref 0.8–1.8)
TSH SERPL-ACNC: 0.06 MIU/L (ref 0.4–4.5)

## 2025-02-25 PROCEDURE — 99212 OFFICE O/P EST SF 10 MIN: CPT | Performed by: SURGERY

## 2025-02-25 PROCEDURE — 3078F DIAST BP <80 MM HG: CPT | Performed by: SURGERY

## 2025-02-25 PROCEDURE — 1159F MED LIST DOCD IN RCRD: CPT | Performed by: SURGERY

## 2025-02-25 PROCEDURE — 1123F ACP DISCUSS/DSCN MKR DOCD: CPT | Performed by: SURGERY

## 2025-02-25 PROCEDURE — 1036F TOBACCO NON-USER: CPT | Performed by: SURGERY

## 2025-02-25 PROCEDURE — 3077F SYST BP >= 140 MM HG: CPT | Performed by: SURGERY

## 2025-02-25 PROCEDURE — 1157F ADVNC CARE PLAN IN RCRD: CPT | Performed by: SURGERY

## 2025-02-25 NOTE — PROGRESS NOTES
Subjective   Patient ID: Robyn Madera is a 80 y.o. female who presents for Follow-up (Results/May Thurners ).  HPI  Follow-up secondary to recent CT  Patient is still having significant swelling in her left lower extremity definitely worse with prolonged standing seated position  Heaviness as well as discomfort also noted at the end of the day.    Review of Systems    Objective   Physical Exam  Physical exam    Constitutional: alert and in no acute distress verbal  Eyes: No erythema swelling or discharge noted  Neck: supple, symmetric, trachea midline, no masses noted  Cardiovascular: Carotid pulses 2+, no obvious bruit, no Jugular distension noted, no thrill, heart regular rate, lower extremity vascular exam intact, cap refill <2 sec  Pulmonary:  Bilateral breath sounds intact, clear with rales rhonchi or wheeze  Abdomen: soft non tender, no pulsatile masses noted, no rebound rigidity or guarding noted  Skin: intact warm no abnormal turgor  Psychiatric: alert without any obvious cognitive issues, oriented to person, place, and time  1+ pretibial left lower extremity edema    Assessment/Plan   CT reviewed with patient suggesting 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

## 2025-03-04 ENCOUNTER — TELEPHONE (OUTPATIENT)
Dept: VASCULAR SURGERY | Facility: HOSPITAL | Age: 80
End: 2025-03-04
Payer: MEDICARE

## 2025-03-04 NOTE — TELEPHONE ENCOUNTER
Procedure scheduled for 3/21/25  Pre op sent through [facility]     ----- Message from Jagjit Tolbert sent at 2/26/2025  2:52 PM EST -----  Regarding: RE: VENOGRAM 3/21/25  opened  ----- Message -----  From: Aparna Allen LPN  Sent: 2/25/2025   2:48 PM EST  To: Jagjit Tolbert, DO; Aparna Allen LPN  Subject: VENOGRAM 3/21/25                                 PLEASE START CASE FOR 3/21/25    FUV SCHEDULED 4/2/25 2:00PM CHINA CASAS  PRE OP THROUGH [facility]

## 2025-03-05 ENCOUNTER — HOSPITAL ENCOUNTER (OUTPATIENT)
Dept: CARDIOLOGY | Facility: HOSPITAL | Age: 80
Discharge: HOME | End: 2025-03-05
Payer: MEDICARE

## 2025-03-05 ENCOUNTER — ANESTHESIA EVENT (OUTPATIENT)
Dept: OPERATING ROOM | Facility: HOSPITAL | Age: 80
End: 2025-03-05
Payer: MEDICARE

## 2025-03-05 VITALS — WEIGHT: 156 LBS | BODY MASS INDEX: 25.18 KG/M2

## 2025-03-05 DIAGNOSIS — R94.31 ABNORMAL ELECTROCARDIOGRAM (ECG) (EKG): ICD-10-CM

## 2025-03-05 DIAGNOSIS — R93.1 AGATSTON CAC SCORE, >400: ICD-10-CM

## 2025-03-05 PROCEDURE — 93016 CV STRESS TEST SUPVJ ONLY: CPT | Performed by: INTERNAL MEDICINE

## 2025-03-05 PROCEDURE — 93018 CV STRESS TEST I&R ONLY: CPT | Performed by: INTERNAL MEDICINE

## 2025-03-05 PROCEDURE — 2500000004 HC RX 250 GENERAL PHARMACY W/ HCPCS (ALT 636 FOR OP/ED): Performed by: INTERNAL MEDICINE

## 2025-03-05 PROCEDURE — 93350 STRESS TTE ONLY: CPT | Performed by: INTERNAL MEDICINE

## 2025-03-05 PROCEDURE — 93017 CV STRESS TEST TRACING ONLY: CPT

## 2025-03-05 RX ORDER — DOBUTAMINE HYDROCHLORIDE 400 MG/100ML
30 INJECTION INTRAVENOUS CONTINUOUS
Status: DISCONTINUED | OUTPATIENT
Start: 2025-03-05 | End: 2025-03-06 | Stop reason: HOSPADM

## 2025-03-05 RX ORDER — METOPROLOL TARTRATE 1 MG/ML
5 INJECTION, SOLUTION INTRAVENOUS ONCE
Status: COMPLETED | OUTPATIENT
Start: 2025-03-05 | End: 2025-03-05

## 2025-03-05 RX ADMIN — DOBUTAMINE HYDROCHLORIDE 30 MCG/KG/MIN: 400 INJECTION INTRAVENOUS at 11:16

## 2025-03-05 RX ADMIN — METOPROLOL TARTRATE 5 MG: 5 INJECTION INTRAVENOUS at 11:15

## 2025-03-05 RX ADMIN — PERFLUTREN 2 ML OF DILUTION: 6.52 INJECTION, SUSPENSION INTRAVENOUS at 11:16

## 2025-03-07 ENCOUNTER — APPOINTMENT (OUTPATIENT)
Dept: ENDOCRINOLOGY | Facility: CLINIC | Age: 80
End: 2025-03-07
Payer: MEDICARE

## 2025-03-07 ENCOUNTER — DOCUMENTATION (OUTPATIENT)
Dept: CARDIOLOGY | Facility: HOSPITAL | Age: 80
End: 2025-03-07

## 2025-03-07 ENCOUNTER — TELEPHONE (OUTPATIENT)
Dept: PRIMARY CARE | Facility: CLINIC | Age: 80
End: 2025-03-07

## 2025-03-07 VITALS
HEART RATE: 105 BPM | BODY MASS INDEX: 24.96 KG/M2 | DIASTOLIC BLOOD PRESSURE: 64 MMHG | RESPIRATION RATE: 16 BRPM | WEIGHT: 155.3 LBS | HEIGHT: 66 IN | SYSTOLIC BLOOD PRESSURE: 122 MMHG

## 2025-03-07 DIAGNOSIS — E04.2 MULTIPLE THYROID NODULES: ICD-10-CM

## 2025-03-07 DIAGNOSIS — M85.80 OSTEOPENIA, UNSPECIFIED LOCATION: ICD-10-CM

## 2025-03-07 DIAGNOSIS — E05.90 SUBCLINICAL HYPERTHYROIDISM: Primary | ICD-10-CM

## 2025-03-07 DIAGNOSIS — Z78.0 MENOPAUSE: ICD-10-CM

## 2025-03-07 PROCEDURE — 1159F MED LIST DOCD IN RCRD: CPT | Performed by: INTERNAL MEDICINE

## 2025-03-07 PROCEDURE — 1123F ACP DISCUSS/DSCN MKR DOCD: CPT | Performed by: INTERNAL MEDICINE

## 2025-03-07 PROCEDURE — 1157F ADVNC CARE PLAN IN RCRD: CPT | Performed by: INTERNAL MEDICINE

## 2025-03-07 PROCEDURE — 1160F RVW MEDS BY RX/DR IN RCRD: CPT | Performed by: INTERNAL MEDICINE

## 2025-03-07 PROCEDURE — G2211 COMPLEX E/M VISIT ADD ON: HCPCS | Performed by: INTERNAL MEDICINE

## 2025-03-07 PROCEDURE — 3074F SYST BP LT 130 MM HG: CPT | Performed by: INTERNAL MEDICINE

## 2025-03-07 PROCEDURE — 1036F TOBACCO NON-USER: CPT | Performed by: INTERNAL MEDICINE

## 2025-03-07 PROCEDURE — 99214 OFFICE O/P EST MOD 30 MIN: CPT | Performed by: INTERNAL MEDICINE

## 2025-03-07 PROCEDURE — 3078F DIAST BP <80 MM HG: CPT | Performed by: INTERNAL MEDICINE

## 2025-03-07 RX ORDER — METHIMAZOLE 5 MG/1
5 TABLET ORAL DAILY
Qty: 90 TABLET | Refills: 1 | Status: SHIPPED | OUTPATIENT
Start: 2025-03-07 | End: 2026-03-07

## 2025-03-07 RX ORDER — MIRABEGRON 50 MG/1
TABLET, FILM COATED, EXTENDED RELEASE ORAL
COMMUNITY
Start: 2025-01-07

## 2025-03-07 ASSESSMENT — ENCOUNTER SYMPTOMS
VOMITING: 0
DIARRHEA: 0
SHORTNESS OF BREATH: 0
CHILLS: 0
NAUSEA: 0
PALPITATIONS: 0
FATIGUE: 0
FEVER: 0
COUGH: 0
HEADACHES: 0

## 2025-03-07 NOTE — PROGRESS NOTES
[address] [address]                   Phone# [phone]              Fax# [phone]      Date: 25    RE: Robyn Madera            : 1945       Surgical/Procedural Clearance for:  Vascular surgery  Patient is at: LOW cardiovascular risk for this INTERMEDIATE risk procedure.           N/A hold Antiplatelet      N/A hold Anticoagulant                     Is further cardiac workup is needed prior to the procedure?  No     Patient should continue Beta Blocker in the perioperative period.  Yes     Patient should resume antiplatelet/anticoagulation as soon as cleared by surgeon/procedure physician.  N/A       Thank You,    25 at 10:23 AM - Carl William MD

## 2025-03-07 NOTE — PATIENT INSTRUCTIONS
Start methimaozle 5 mg a day  Repeat labs in 2 months  Follow up in 6months  Schedule bone density  Call or message with concerns

## 2025-03-07 NOTE — ASSESSMENT & PLAN NOTE
Labs discussed   Will trial methimazole 5 mg, discussed side effects to watch for  Repeat labs in 2 months

## 2025-03-07 NOTE — PROGRESS NOTES
Endocrinology: Follow up visit  Subjective   Patient ID: Robyn Madera is a 80 y.o. female who presents for Hyperthyroidism and Goiter.    PCP: Reanna Martinez MD    HPI  Subclinical hyperthyroidism  Us in June 2023 was stable with no further eval needed    Uptake and scan in December 2023 normal. Watching levels for now.   Tsi negative tpo +  Last time discussed trial of methimazole but she deferred  Tsh lower this visit  Feeling ok but stressed by recent cardiac calcium score and vascular issues    Us due 2028  Dexa due 2025    Review of Systems   Constitutional:  Negative for chills, fatigue and fever.   Respiratory:  Negative for cough and shortness of breath.    Cardiovascular:  Negative for chest pain and palpitations.   Gastrointestinal:  Negative for diarrhea, nausea and vomiting.   Neurological:  Negative for headaches.       Patient Active Problem List   Diagnosis    Agatston CAC score, >400    Benign essential hypertension    Bilateral hearing loss    Chronic constipation    Other specified disease of esophagus    Hashimoto's thyroiditis    Hip pain, bilateral    Mixed hyperlipidemia    Impaired fasting glucose    Lumbar spondylolysis    Multiple thyroid nodules    Osteopenia    Pelvic floor dysfunction    Primary osteoarthritis of right hip    PVC's (premature ventricular contractions)    RBBB (right bundle branch block with left anterior fascicular block)    Spinal stenosis of lumbar region    Subclinical hyperthyroidism    Tinnitus of both ears    Generalized anxiety disorder    Lumbosacral spondylosis    Primary osteoarthritis of left knee    Hiatal hernia    Chronic lumbar radiculopathy    Lumbar scoliosis    Acute anxiety    Microhematuria    Overactive bladder    Leg edema, right    Elevated coronary artery calcium score    B12 deficiency    Long term (current) use of opiate analgesic    Medicare annual wellness visit, subsequent    Venous reflux    Pancreatic cyst (HHS-HCC)     Gastroesophageal reflux disease    Contusion of right knee        Home Meds:  Current Outpatient Medications   Medication Instructions    acetaminophen (TYLENOL) 500 mg, Daily    amLODIPine (NORVASC) 10 mg, oral, Daily    aspirin 81 mg EC tablet 1 tablet, Daily    atorvastatin (LIPITOR) 40 mg, oral, Nightly    busPIRone (BUSPAR) 15 mg, oral, 2 times daily    calcium carbonate-vitamin D3 500 mg-3.125 mcg (125 unit) tablet tablet Take by mouth.    cyanocobalamin (VITAMIN B-12) 1,000 mcg, Daily    diazePAM (VALIUM) 5 mg, oral, See admin instructions, Patient may take diazepam 5 mg 1 to 2 tablets prior to her MRI    diclofenac sodium (VOLTAREN) 4 g, 4 times daily PRN    docusate sodium (Colace) 100 mg capsule 2 times daily    DULoxetine (CYMBALTA) 60 mg, oral, Daily, Do not crush or chew.  Will take duloxetine 60 mg and 30 mg capsule daily for a total dose of 90 mg daily.  Please hold until fill date    DULoxetine (CYMBALTA) 30 mg, oral, Daily, Do not crush or chew.  Patient will take duloxetine 30 mg and 60 mg capsule daily for a total dose of 90 mg daily.   Please hold until fill date    esomeprazole (NexIUM) 20 mg DR capsule 1 capsule, Daily    fesoterodine (TOVIAZ) 4 mg, oral, Daily    gabapentin (NEURONTIN) 300 mg, oral, 3 times daily    lactase (LACTAID) 3,000 Units, 3 times daily PRN    lubiprostone (AMITIZA) 8 mcg, oral, 2 times daily (morning and late afternoon), Take with meals    Myrbetriq 50 mg tablet extended release 24 hr 24 hr tablet     omeprazole OTC (PRILOSEC OTC) 20 mg, Daily before breakfast    oxyCODONE (ROXICODONE) 5 mg, oral, 2 times daily PRN    polyethylene glycol (Glycolax) 17 gram/dose powder Every other day        Allergies   Allergen Reactions    House Dust Hives    Adhesive Tape-Silicones Other     ADHESIVES CAUSES BLISTERS    Cat Dander Unknown    Penicillins Hives        Objective   Vitals:    03/07/25 1133   BP: 122/64   Pulse: 105   Resp: 16      Vitals:    03/07/25 1133   Weight: 70.4  kg (155 lb 4.8 oz)      Body mass index is 25.07 kg/m².   Physical Exam  Constitutional:       Appearance: Normal appearance. She is normal weight.   HENT:      Head: Normocephalic and atraumatic.   Neck:      Thyroid: No thyroid mass, thyromegaly or thyroid tenderness.   Cardiovascular:      Rate and Rhythm: Normal rate and regular rhythm.      Heart sounds: No murmur heard.     No gallop.   Pulmonary:      Effort: Pulmonary effort is normal.      Breath sounds: Normal breath sounds.   Abdominal:      Palpations: Abdomen is soft.      Comments: benign   Neurological:      General: No focal deficit present.      Mental Status: She is alert and oriented to person, place, and time.      Deep Tendon Reflexes: Reflexes are normal and symmetric.   Psychiatric:         Behavior: Behavior is cooperative.       Labs:  Lab Results   Component Value Date    HGBA1C 6.3 (H) 11/26/2024    LDLDIRECT 62 11/26/2024    TSH 0.06 (L) 02/24/2025    FREET4 1.3 02/24/2025      Lab Results   Component Value Date    THYROIDPAB >1,000 (H) 11/30/2023    TSI 1.1 11/30/2023        Assessment/Plan   Assessment & Plan  Subclinical hyperthyroidism    Labs discussed   Will trial methimazole 5 mg, discussed side effects to watch for  Repeat labs in 2 months  Multiple thyroid nodules    Due us 2028  Osteopenia, unspecified location    Dexa due: ordered      Electronically signed by:  Roselia Rivera MD 03/07/25 11:35 AM

## 2025-03-10 ENCOUNTER — OFFICE VISIT (OUTPATIENT)
Dept: PAIN MEDICINE | Facility: HOSPITAL | Age: 80
End: 2025-03-10
Payer: MEDICARE

## 2025-03-10 VITALS
RESPIRATION RATE: 16 BRPM | OXYGEN SATURATION: 99 % | BODY MASS INDEX: 25.17 KG/M2 | SYSTOLIC BLOOD PRESSURE: 150 MMHG | DIASTOLIC BLOOD PRESSURE: 90 MMHG | HEIGHT: 66 IN | WEIGHT: 156.6 LBS | HEART RATE: 98 BPM

## 2025-03-10 DIAGNOSIS — Z79.891 LONG TERM PRESCRIPTION OPIATE USE: ICD-10-CM

## 2025-03-10 DIAGNOSIS — M48.062 SPINAL STENOSIS OF LUMBAR REGION WITH NEUROGENIC CLAUDICATION: ICD-10-CM

## 2025-03-10 DIAGNOSIS — M79.2 NEUROPATHIC PAIN: ICD-10-CM

## 2025-03-10 PROCEDURE — 99214 OFFICE O/P EST MOD 30 MIN: CPT | Performed by: PHYSICAL MEDICINE & REHABILITATION

## 2025-03-10 PROCEDURE — 3077F SYST BP >= 140 MM HG: CPT | Performed by: PHYSICAL MEDICINE & REHABILITATION

## 2025-03-10 PROCEDURE — 1123F ACP DISCUSS/DSCN MKR DOCD: CPT | Performed by: PHYSICAL MEDICINE & REHABILITATION

## 2025-03-10 PROCEDURE — 1159F MED LIST DOCD IN RCRD: CPT | Performed by: PHYSICAL MEDICINE & REHABILITATION

## 2025-03-10 PROCEDURE — 1157F ADVNC CARE PLAN IN RCRD: CPT | Performed by: PHYSICAL MEDICINE & REHABILITATION

## 2025-03-10 PROCEDURE — G2211 COMPLEX E/M VISIT ADD ON: HCPCS | Performed by: PHYSICAL MEDICINE & REHABILITATION

## 2025-03-10 PROCEDURE — 3080F DIAST BP >= 90 MM HG: CPT | Performed by: PHYSICAL MEDICINE & REHABILITATION

## 2025-03-10 RX ORDER — GABAPENTIN 100 MG/1
300 CAPSULE ORAL 3 TIMES DAILY
Qty: 810 CAPSULE | Refills: 1 | Status: CANCELLED | OUTPATIENT
Start: 2025-03-10 | End: 2025-09-06

## 2025-03-10 RX ORDER — DULOXETIN HYDROCHLORIDE 30 MG/1
30 CAPSULE, DELAYED RELEASE ORAL DAILY
Qty: 90 CAPSULE | Refills: 1 | Status: CANCELLED | OUTPATIENT
Start: 2025-03-10 | End: 2025-09-06

## 2025-03-10 RX ORDER — DULOXETIN HYDROCHLORIDE 60 MG/1
60 CAPSULE, DELAYED RELEASE ORAL DAILY
Qty: 90 CAPSULE | Refills: 1 | Status: CANCELLED | OUTPATIENT
Start: 2025-03-10 | End: 2025-09-06

## 2025-03-10 ASSESSMENT — ENCOUNTER SYMPTOMS
DEPRESSION: 0
LOSS OF SENSATION IN FEET: 1
OCCASIONAL FEELINGS OF UNSTEADINESS: 1

## 2025-03-10 NOTE — H&P (VIEW-ONLY)
"Subjective   Patient ID: Robyn Madera is a 80 y.o. female who presents for Back Pain.  HPI    Patient returns to the office for interval re-evaluation of \"lower back pain intermittent sciatic pain, periods of neuropathy to bilateral legs and feet. Generalized arthritic pain-worsens with cold. I need a left hip replacement.\"     Fell 6 weeks ago and has decided to get hip replacement with [facility] after the holidays/ Swelling in the left leg-states     8/9/24-right iliac stent only-Dr Tolbert  03/21/2025- left iliac stent by Dr. Tolbert planned  Then can schedule the left hip replacement.      Pain Score: 6/10     Treatment: Oxycodone 5mg BID  Medication count: 23  Last doses taken: 03/10/2025  Fill date: 02/14/2025     Efficacy: 75-80% relief     Narcan: EXP. 12/2025     Other medications/neuromodulators: Gabapentin 300MG TID-refill , Cymbalta 30mg/60mg-refill needed [facility] #90 day supply/Tylenol     Injections and/or Procedures: none recent      Other: does home exercises    80-year-old female with lower back pain with bilateral extremity claudication symptoms presenting today for follow-up and MRI review.  Her symptoms are fairly similar to previous evaluation.  She is overall doing decent on her current regimen of gabapentin 3 mg 3 times daily, duloxetine 90 mg in the morning and oxycodone 5 mg at most twice per day.  She is going to be having some stenting in her left iliac in a couple of weeks and then is hopeful to have a left total hip arthroplasty at the [facility] sometime in April.  Otherwise continues to have a claudication symptoms improved with medications and therapy as above.                  OARRS:  Cooper Merchant MD on 3/10/2025 10:44 AM  I have personally reviewed the OARRS report for Robyn Madera. I have considered the risks of abuse, dependence, addiction and diversion and I believe that it is clinically appropriate for Robyn Madera to be prescribed this medication    Is the " patient prescribed a combination of a benzodiazepine and opioid?  No    Last Urine Drug Screen / ordered today: Yes  Recent Results (from the past 8760 hours)   Confirmation Opiate/Opioid/Benzo Prescription Compliance    Collection Time: 09/16/24 11:51 AM   Result Value Ref Range    Clonazepam <25 <25 ng/mL    7-Aminoclonazepam <25 <25 ng/mL    Alprazolam <25 <25 ng/mL    Alpha-Hydroxyalprazolam <25 <25 ng/mL    Midazolam <25 <25 ng/mL    Alpha-Hydroxymidazolam <25 <25 ng/mL    Chlordiazepoxide <25 <25 ng/mL    Diazepam <25 <25 ng/mL    Nordiazepam <25 <25 ng/mL    Temazepam <25 <25 ng/mL    Oxazepam <25 <25 ng/mL    Lorazepam <25 <25 ng/mL    Methadone <25 <25 ng/mL    EDDP <25 <25 ng/mL    6-Acetylmorphine <25 <25 ng/mL    Codeine <50 <50 ng/mL    Hydrocodone <25 <25 ng/mL    Hydromorphone <25 <25 ng/mL    Morphine  <50 <50 ng/mL    Norhydrocodone <25 <25 ng/mL    Noroxycodone >1,000 (H) <25 ng/mL    Oxycodone 927 (H) <25 ng/mL    Oxymorphone 159 (H) <25 ng/mL    Fentanyl <2.5 <2.5 ng/mL    Norfentanyl <2.5 <2.5 ng/mL    Tramadol <50 <50 ng/mL    O-Desmethyltramadol <50 <50 ng/mL    Zolpidem <25 <25 ng/mL    Zolpidem Metabolite (ZCA) <25 <25 ng/mL   Screen Opiate/Opioid/Benzo Prescription Compliance    Collection Time: 09/16/24 11:51 AM   Result Value Ref Range    Creatinine, Urine Random 48.0 20.0 - 320.0 mg/dL    Amphetamine Screen, Urine Presumptive Negative Presumptive Negative    Barbiturate Screen, Urine Presumptive Negative Presumptive Negative    Cannabinoid Screen, Urine Presumptive Negative Presumptive Negative    Cocaine Metabolite Screen, Urine Presumptive Negative Presumptive Negative    PCP Screen, Urine Presumptive Negative Presumptive Negative   Tapentadol Confirmation, Urine    Collection Time: 09/16/24 11:51 AM   Result Value Ref Range    Tapentadol <25 <25 ng/mL    N-Desmethyltapentadol <25 <25 ng/mL   Buprenorphine Confirm,Urine    Collection Time: 09/16/24 11:51 AM   Result Value Ref Range     BUPRENORPHINE GLUC, URINE <5 ng/mL    BUPRENORPHINE ,URINE <2 ng/mL    NALOXONE, URINE <100 ng/mL    NORBUPRENORPHINE GLUC,URINE <5 ng/mL    NORBUPRENORPHINE, URINE <2 ng/mL     Results are as expected.     Controlled Substance Agreement:  Date of the Last Agreement: 9/16/24  Reviewed Controlled Substance Agreement including but not limited to the benefits, risks, and alternatives to treatment with a Controlled Substance medication(s).    Monitoring and compliance:    ORT:    PDUQ:    Office Agreement:      Review of Systems     Current Outpatient Medications   Medication Instructions    acetaminophen (TYLENOL) 500 mg, Daily    amLODIPine (NORVASC) 10 mg, oral, Daily    aspirin 81 mg EC tablet 1 tablet, Daily    atorvastatin (LIPITOR) 40 mg, oral, Nightly    busPIRone (BUSPAR) 15 mg, oral, 2 times daily    calcium carbonate-vitamin D3 500 mg-3.125 mcg (125 unit) tablet tablet Take by mouth.    cyanocobalamin (VITAMIN B-12) 1,000 mcg, Daily    diazePAM (VALIUM) 5 mg, oral, See admin instructions, Patient may take diazepam 5 mg 1 to 2 tablets prior to her MRI    diclofenac sodium (VOLTAREN) 4 g, 4 times daily PRN    docusate sodium (Colace) 100 mg capsule 2 times daily    DULoxetine (CYMBALTA) 60 mg, oral, Daily, Do not crush or chew.  Will take duloxetine 60 mg and 30 mg capsule daily for a total dose of 90 mg daily.  Please hold until fill date    DULoxetine (CYMBALTA) 30 mg, oral, Daily, Do not crush or chew.  Patient will take duloxetine 30 mg and 60 mg capsule daily for a total dose of 90 mg daily.   Please hold until fill date    esomeprazole (NexIUM) 20 mg DR capsule 1 capsule, Daily    fesoterodine (TOVIAZ) 4 mg, oral, Daily    gabapentin (NEURONTIN) 300 mg, oral, 3 times daily    lactase (LACTAID) 3,000 Units, 3 times daily PRN    lubiprostone (AMITIZA) 8 mcg, oral, 2 times daily (morning and late afternoon), Take with meals    methIMAzole (TAPAZOLE) 5 mg, oral, Daily    Myrbetriq 50 mg tablet extended  release 24 hr 24 hr tablet     omeprazole OTC (PRILOSEC OTC) 20 mg, Daily before breakfast    oxyCODONE (ROXICODONE) 5 mg, oral, 2 times daily PRN    polyethylene glycol (Glycolax) 17 gram/dose powder Every other day        Past Medical History:   Diagnosis Date    Abnormal findings on diagnostic imaging of heart and coronary circulation 11/07/2019    Abnormal Heart Score CT    Abnormal levels of other serum enzymes 10/24/2019    Abnormal cardiac enzyme level    Allergic rhinitis due to animal hair or dander 10/28/2013    Anxiety 2023    Arthritis 2016    Chronic constipation 2022    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/2014    Disease of thyroid gland 2018    Diverticulosis of colon 06/17/2013    Diverticulosis of intestine, part unspecified, without perforation or abscess without bleeding     Diverticulosis    Dizziness     positional    Gastric polyp 06/23/2014    GERD (gastroesophageal reflux disease) 2008    Glaucoma suspect, both eyes 01/08/2015    HL (hearing loss) 2022    Hx of squamous cell carcinoma of skin 06/09/2015    Hypertension 2015    Internal hemorrhoid 03/21/2016    Irregular heart beat     Low TSH level 03/10/2023    Lung nodule, solitary 03/10/2023    Lung nodule, solitary 03/10/2023    Stable CT 6/23  No follow up needed    Mild intermittent asthma without complication ([facility]) 02/24/2016    Other microscopic hematuria 03/01/2018    Microscopic hematuria    Other specified abnormal findings of blood chemistry 10/19/2021    Elevated LFTs    Other specified abnormal findings of blood chemistry 10/19/2021    Elevated LFTs    Pain in right knee     Right knee pain    Personal history of other diseases of the digestive system     History of hiatal hernia    Personal history of other diseases of the musculoskeletal system and connective tissue 01/17/2020    History of osteopenia    Personal history of other diseases of the musculoskeletal system and connective tissue     History of  herniated intervertebral disc    Personal history of other diseases of the musculoskeletal system and connective tissue     History of chronic back pain    Personal history of other diseases of the nervous system and sense organs     History of cataract    Personal history of other diseases of the nervous system and sense organs     History of glaucoma    Personal history of other diseases of the respiratory system     History of allergic rhinitis    Personal history of other endocrine, nutritional and metabolic disease     History of hyperlipidemia    Personal history of other endocrine, nutritional and metabolic disease     History of Graves' disease    Personal history of other endocrine, nutritional and metabolic disease     History of hypoglycemia    Personal history of other malignant neoplasm of skin     History of malignant neoplasm of skin    Personal history of other mental and behavioral disorders     History of anxiety    Presence of right artificial knee joint 09/21/2017    Scoliosis 2008    Spinal stenosis     Vision loss     Visual impairment 2008    Wears partial dentures     upper    Wrist sprain 03/10/2023        Past Surgical History:   Procedure Laterality Date    HIP ARTHROPLASTY      right    JOINT REPLACEMENT Bilateral 2017-18    knees    KNEE ARTHROSCOPY W/ DEBRIDEMENT  07/09/2016    Arthroscopy Knee    OTHER SURGICAL HISTORY  10/14/2019    Excision of squamous cell carcinoma        Family History   Problem Relation Name Age of Onset    Diabetes Mother Jaci Zachery         Allergies   Allergen Reactions    House Dust Hives    Adhesive Tape-Silicones Other     ADHESIVES CAUSES BLISTERS    Cat Dander Unknown    Penicillins Hives        MR lumbar spine wo IV contrast 12/31/2024    Narrative  Interpreted By:  Amrita Pandya,  STUDY:  MR LUMBAR SPINE WO IV CONTRAST    INDICATION:  Signs/Symptoms:lumbar radicular pain    COMPARISON:    Lumbar spine MRI 01/25/2023    ACCESSION  NUMBER(S):  [id number]    ORDERING CLINICIAN:  CHRISTOFER REYES    TECHNIQUE:  Multiplanar multisequence MRI of the lumbar spine was performed  without the administration of intravenous contrast, according to  standard protocol.    FINDINGS:  ALIGNMENT: Straightening of usual lumbar lordosis. Levoconvex  scoliosis centered at L1-2. Mild retrolisthesis of L3 on L4 and L5 on  S1.  leftward rotational scoliosis of the lumbar spine.    VERTEBRAE: Mild Modic type 1 endplate degenerative change at L2-3.  Mixed Modic type 1 and type 2 endplate degenerative change at L4-5.  Mild Modic type 1 endplate degenerative change at L5-S1. No acute  fracture or aggressive osseous lesion. Multilevel Schmorl's nodes  throughout the lumbar spine noted. Anterior bridging osteophytes  throughout the lumbar spine noted.    DISCS: Multilevel disc desiccation and severe disc height loss most  notably involving the right aspect of the L2-3 and L3-4 discs as well  as left aspects of the L4-5 and L5-S1 discs.    CONUS MEDULLARIS AND CAUDA EQUINA: The conus medullaris terminates at  L1. Crowding versus mass-effect on the cauda equina nerve roots at  L3-4, similar to previous MRI 01/25/2023. Cauda equina nerve roots  are otherwise unremarkable.    PARAVERTEBRAL SOFT TISSUES AND VISUALIZED RETROPERITONEUM: The  visualized paravertebral soft tissues appear within normal limits.    EVALUATION OF INDIVIDUAL LEVELS:  L1-2: Disc bulge asymmetric to the right with facet hypertrophy  results in moderate right foraminal stenosis as well as monitoring of  the spinal canal. Left neural foramen is patent.    L2-3: Disc osteophyte complex asymmetric to the right with facet  hypertrophy results in moderate right and minimal left foraminal  stenosis. There is moderate narrowing of the spinal canal and  crowding of the right-greater-than-left subarticular recesses.    L3-4: Disc osteophyte complex with facet hypertrophy and infolding of  ligamentum flavum results  in severe canal and moderate to severe left  foraminal stenosis. There is mild-to-moderate right foraminal  stenosis.    L4-5: Disc osteophyte complex asymmetric to the left with facet  hypertrophy and infolding of ligamentum flavum. There is moderate  narrowing of the spinal canal with effacement of the left  subarticular recess likely contacting the traversing left L5 nerve  root. There is moderate left and mild right foraminal stenosis.    L5-S1: Disc bulge with superimposed central/left paracentral disc  protrusion containing annular fissure effacing the left subarticular  recess and likely contacting the traversing left S1 nerve root. There  is moderate left-greater-than-right foraminal stenosis. Spinal canal  is minimally narrowed.    LIMITED EVALUATION OF UPPER SACRUM AND SACROILIAC JOINTS: Mild  degenerative changes of the sacroiliac joints.    Impression  Multilevel degenerative changes of the lumbar spine most notable at  L3-4 where there is severe canal stenosis resulting in crowding  versus mass-effect on the cauda equina nerve roots. Additional  central/left paracentral disc protrusion containing annular fissure  L5-S1 likely contacting the traversing left S1 nerve root. Findings  are stable compared to previous MRI 01/25/2023.    Levoconvex scoliosis centered at L1-2 and leftward rotational  scoliosis noted.    Signed by: Amrita Pandya 1/2/2025 2:56 PM  Dictation workstation:   [id number]      MR LUMBAR SPINE WO IV CONTRAST 01/25/2023    Narrative  MRN: [id number]  Patient Name: CHELY CALL    STUDY:  MRI L-SPINE WO;  1/25/2023 4:22 pm    INDICATION:  worsening low back/radicular pain with LE weakness  M48.061: Spinal  stenosis, lumbar.    COMPARISON:  MRI lumbar spine 12/10/2019    ACCESSION NUMBER(S):  [id number]    ORDERING CLINICIAN:  CHRISTOFER REYES    TECHNIQUE:  Sagittal T1, T2, STIR, axial T1 and T2 weighted images of the lumbar  spine were acquired.    FINDINGS:  Alignment: Straightening of the  lumbar lordosis. Moderate  levoscoliosis centered at L1-L2.    Vertebrae/Intervertebral Discs: The vertebral bodies demonstrate  expected height.The marrow signal is within normal limits. Multilevel  loss of disc space throughout the lumbar spine.    Conus: The lower thoracic cord appears unremarkable. The conus  terminates at L1.    Multilevel degenerative changes of the lumbar spine including  endplate remodeling, disc bulges, ligamentum flavum hypertrophy facet  degenerative changes and vacuum phenomenon.    T12-L1: Disc bulge asymmetric to the left, ligamentum flavum  hypertrophy and facet degenerative changes with mild canal stenosis.  Mild bilateral neural foraminal narrowing.    L1-2: Disc bulge asymmetric to the right, ligamentum flavum  hypertrophy and facet degenerative changes with mild canal stenosis.  Right subarticular recess narrowing. Mild right neural foraminal  narrowing. Left neural foramen is patent.    L2-3: Disc bulge asymmetric to the right, ligamentum flavum  hypertrophy and facet degenerative changes with mild canal stenosis.  Right subarticular recess narrowing. Moderate right neural foraminal  narrowing. Left neural foramen is patent.    L3-4: Disc bulge asymmetric to the right, ligamentum flavum  hypertrophy and facet degenerative change with moderate canal  stenosis. Right-greater-than-left subarticular recess narrowing.  Moderate bilateral neural foraminal narrowing.    L4-5: Disc bulge asymmetric to the left, ligamentum flavum  hypertrophy and facet degenerative change with moderate canal  stenosis. Bilateral subarticular recess narrowing. Moderate left and  mild right neural foraminal narrowing.    L5-S1: Disc bulge asymmetric to the left, ligamentum flavum  hypertrophy and facet degenerative changes with mild canal stenosis.  Left-greater-than-right subarticular recess narrowing.  Right-greater-than-left moderate neural foraminal narrowing.    The prevertebral and posterior  paraspinous soft tissues are  unremarkable.    Impression  1.  Moderate levoscoliosis centered at L1-L2.  2. Advanced multilevel degenerative changes of the lumbar spine with  up to moderate canal stenosis and neural foraminal narrowing, most  prominent at L3-L4 and L4-L5.  3. Multilevel subarticular recess narrowing.      Objective     Vitals:    03/10/25 1123   BP: 150/90   Pulse:    Resp:    SpO2:                Physical Exam    GENERAL EXAM  Vital Signs: Vital signs to include heart rate, respiration rate, blood pressure, and temperature were reviewed.  General Appearance:  Awake, alert, healthy appearing, well developed, No acute distress.  Head: Normocephalic without evidence of head injury.  Neck: The appearance of the neck was normal without swelling with a midline trachea.  Eyes: The eyelids and eyebrows exhibited no abnormalities.  Pupils were not pin-point.  Sclera was without icterus.  Lungs: Respiration rhythm and depth was normal.  Respiratory movements were normal without labored breathing.  Cardiovascular: No peripheral edema was present.    Neurological: Patient was oriented to time, place, and person.  Speech was normal.  Balance, gait, and stance were unremarkable.    Psychiatric: Appearance was normal with appropriate dress.  Mood was euthymic and affect was normal.  Skin: Affected regions were without ecchymosis or skin lesions.      Assessment/Plan   Diagnoses and all orders for this visit:  Long term prescription opiate use  -     Buprenorphine Confirm,Urine; Future  -     Tapentadol Confirmation, Urine; Future  -     Opiate/Opioid/Benzo Prescription Compliance; Future  -     Ethyl Glucuronide Screen To Confirm,U; Future  Spinal stenosis of lumbar region with neurogenic claudication  Neuropathic pain    80-year-old female with lower back pain with lower extremity claudication symptoms.  I did personally review her MRI of her lumbar spine which I discussed with her today which showed some  scoliosis and significant degenerative changes throughout her lumbar spine with the worst levels being L3-4 and L4-5 with some ligamentum flavum hypertrophy at both of those levels causing central canal stenosis.  Also do some foraminal stenosis but the central canal stenosis peers to be much worse.  We did have a brief discussion about MILD for the ligamentum flavum hypertrophy that this could potentially be an option at L3-4 and L4-5 however she needs to really have her vascular stenting done and her left total hip arthroplasty done first.    Plan:  - OARRS reviewed no issues, pill count appropriate and continues to receive good benefit from very low-dose opioid.  It is reasonable to continue with very low-dose oxycodone no more than 2/day.  - We will continue with duloxetine 90 mg daily, I do recommend she trial darting that at night given that she is having some drowsiness in the morning.  Also should continue to take gabapentin 300 mg 3 times daily.  Patient states she is good for another couple of weeks she will call for refills for all of her medications when appropriate  - As patient will be having upcoming total hip arthroplasty it would be appropriate for her to be managed with a higher dose of short acting opioid from her surgeon, I would recommend limiting that to no more than a week of increased dose.  In addition I do recommend that she continue on her duloxetine 90 mg daily and gabapentin 300 mg 3 times daily during the pre and perioperative phase of the total hip arthroplasty.  - As above if she could be a candidate for mild once all of these issues are taken care of above.  She can discuss that with my nurse practitioner at her next visit in 3 months.  - Urine drug screen today         This note was generated with the aid of dictation software, there may be typos despite my attempts at proofreading.

## 2025-03-10 NOTE — PROGRESS NOTES
"Subjective   Patient ID: Robyn Madera is a 80 y.o. female who presents for Back Pain.  HPI    Patient returns to the office for interval re-evaluation of \"lower back pain intermittent sciatic pain, periods of neuropathy to bilateral legs and feet. Generalized arthritic pain-worsens with cold. I need a left hip replacement.\"     Fell 6 weeks ago and has decided to get hip replacement with [facility] after the holidays/ Swelling in the left leg-states     8/9/24-right iliac stent only-Dr Tolbert  03/21/2025- left iliac stent by Dr. Tolbert planned  Then can schedule the left hip replacement.      Pain Score: 6/10     Treatment: Oxycodone 5mg BID  Medication count: 23  Last doses taken: 03/10/2025  Fill date: 02/14/2025     Efficacy: 75-80% relief     Narcan: EXP. 12/2025     Other medications/neuromodulators: Gabapentin 300MG TID-refill , Cymbalta 30mg/60mg-refill needed [facility] #90 day supply/Tylenol     Injections and/or Procedures: none recent      Other: does home exercises    80-year-old female with lower back pain with bilateral extremity claudication symptoms presenting today for follow-up and MRI review.  Her symptoms are fairly similar to previous evaluation.  She is overall doing decent on her current regimen of gabapentin 3 mg 3 times daily, duloxetine 90 mg in the morning and oxycodone 5 mg at most twice per day.  She is going to be having some stenting in her left iliac in a couple of weeks and then is hopeful to have a left total hip arthroplasty at the [facility] sometime in April.  Otherwise continues to have a claudication symptoms improved with medications and therapy as above.                  OARRS:  Cooper Merchant MD on 3/10/2025 10:44 AM  I have personally reviewed the OARRS report for Robyn Madera. I have considered the risks of abuse, dependence, addiction and diversion and I believe that it is clinically appropriate for Robyn Madera to be prescribed this medication    Is the " patient prescribed a combination of a benzodiazepine and opioid?  No    Last Urine Drug Screen / ordered today: Yes  Recent Results (from the past 8760 hours)   Confirmation Opiate/Opioid/Benzo Prescription Compliance    Collection Time: 09/16/24 11:51 AM   Result Value Ref Range    Clonazepam <25 <25 ng/mL    7-Aminoclonazepam <25 <25 ng/mL    Alprazolam <25 <25 ng/mL    Alpha-Hydroxyalprazolam <25 <25 ng/mL    Midazolam <25 <25 ng/mL    Alpha-Hydroxymidazolam <25 <25 ng/mL    Chlordiazepoxide <25 <25 ng/mL    Diazepam <25 <25 ng/mL    Nordiazepam <25 <25 ng/mL    Temazepam <25 <25 ng/mL    Oxazepam <25 <25 ng/mL    Lorazepam <25 <25 ng/mL    Methadone <25 <25 ng/mL    EDDP <25 <25 ng/mL    6-Acetylmorphine <25 <25 ng/mL    Codeine <50 <50 ng/mL    Hydrocodone <25 <25 ng/mL    Hydromorphone <25 <25 ng/mL    Morphine  <50 <50 ng/mL    Norhydrocodone <25 <25 ng/mL    Noroxycodone >1,000 (H) <25 ng/mL    Oxycodone 927 (H) <25 ng/mL    Oxymorphone 159 (H) <25 ng/mL    Fentanyl <2.5 <2.5 ng/mL    Norfentanyl <2.5 <2.5 ng/mL    Tramadol <50 <50 ng/mL    O-Desmethyltramadol <50 <50 ng/mL    Zolpidem <25 <25 ng/mL    Zolpidem Metabolite (ZCA) <25 <25 ng/mL   Screen Opiate/Opioid/Benzo Prescription Compliance    Collection Time: 09/16/24 11:51 AM   Result Value Ref Range    Creatinine, Urine Random 48.0 20.0 - 320.0 mg/dL    Amphetamine Screen, Urine Presumptive Negative Presumptive Negative    Barbiturate Screen, Urine Presumptive Negative Presumptive Negative    Cannabinoid Screen, Urine Presumptive Negative Presumptive Negative    Cocaine Metabolite Screen, Urine Presumptive Negative Presumptive Negative    PCP Screen, Urine Presumptive Negative Presumptive Negative   Tapentadol Confirmation, Urine    Collection Time: 09/16/24 11:51 AM   Result Value Ref Range    Tapentadol <25 <25 ng/mL    N-Desmethyltapentadol <25 <25 ng/mL   Buprenorphine Confirm,Urine    Collection Time: 09/16/24 11:51 AM   Result Value Ref Range     BUPRENORPHINE GLUC, URINE <5 ng/mL    BUPRENORPHINE ,URINE <2 ng/mL    NALOXONE, URINE <100 ng/mL    NORBUPRENORPHINE GLUC,URINE <5 ng/mL    NORBUPRENORPHINE, URINE <2 ng/mL     Results are as expected.     Controlled Substance Agreement:  Date of the Last Agreement: 9/16/24  Reviewed Controlled Substance Agreement including but not limited to the benefits, risks, and alternatives to treatment with a Controlled Substance medication(s).    Monitoring and compliance:    ORT:    PDUQ:    Office Agreement:      Review of Systems     Current Outpatient Medications   Medication Instructions    acetaminophen (TYLENOL) 500 mg, Daily    amLODIPine (NORVASC) 10 mg, oral, Daily    aspirin 81 mg EC tablet 1 tablet, Daily    atorvastatin (LIPITOR) 40 mg, oral, Nightly    busPIRone (BUSPAR) 15 mg, oral, 2 times daily    calcium carbonate-vitamin D3 500 mg-3.125 mcg (125 unit) tablet tablet Take by mouth.    cyanocobalamin (VITAMIN B-12) 1,000 mcg, Daily    diazePAM (VALIUM) 5 mg, oral, See admin instructions, Patient may take diazepam 5 mg 1 to 2 tablets prior to her MRI    diclofenac sodium (VOLTAREN) 4 g, 4 times daily PRN    docusate sodium (Colace) 100 mg capsule 2 times daily    DULoxetine (CYMBALTA) 60 mg, oral, Daily, Do not crush or chew.  Will take duloxetine 60 mg and 30 mg capsule daily for a total dose of 90 mg daily.  Please hold until fill date    DULoxetine (CYMBALTA) 30 mg, oral, Daily, Do not crush or chew.  Patient will take duloxetine 30 mg and 60 mg capsule daily for a total dose of 90 mg daily.   Please hold until fill date    esomeprazole (NexIUM) 20 mg DR capsule 1 capsule, Daily    fesoterodine (TOVIAZ) 4 mg, oral, Daily    gabapentin (NEURONTIN) 300 mg, oral, 3 times daily    lactase (LACTAID) 3,000 Units, 3 times daily PRN    lubiprostone (AMITIZA) 8 mcg, oral, 2 times daily (morning and late afternoon), Take with meals    methIMAzole (TAPAZOLE) 5 mg, oral, Daily    Myrbetriq 50 mg tablet extended  release 24 hr 24 hr tablet     omeprazole OTC (PRILOSEC OTC) 20 mg, Daily before breakfast    oxyCODONE (ROXICODONE) 5 mg, oral, 2 times daily PRN    polyethylene glycol (Glycolax) 17 gram/dose powder Every other day        Past Medical History:   Diagnosis Date    Abnormal findings on diagnostic imaging of heart and coronary circulation 11/07/2019    Abnormal Heart Score CT    Abnormal levels of other serum enzymes 10/24/2019    Abnormal cardiac enzyme level    Allergic rhinitis due to animal hair or dander 10/28/2013    Anxiety 2023    Arthritis 2016    Chronic constipation 2022    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/2014    Disease of thyroid gland 2018    Diverticulosis of colon 06/17/2013    Diverticulosis of intestine, part unspecified, without perforation or abscess without bleeding     Diverticulosis    Dizziness     positional    Gastric polyp 06/23/2014    GERD (gastroesophageal reflux disease) 2008    Glaucoma suspect, both eyes 01/08/2015    HL (hearing loss) 2022    Hx of squamous cell carcinoma of skin 06/09/2015    Hypertension 2015    Internal hemorrhoid 03/21/2016    Irregular heart beat     Low TSH level 03/10/2023    Lung nodule, solitary 03/10/2023    Lung nodule, solitary 03/10/2023    Stable CT 6/23  No follow up needed    Mild intermittent asthma without complication ([facility]) 02/24/2016    Other microscopic hematuria 03/01/2018    Microscopic hematuria    Other specified abnormal findings of blood chemistry 10/19/2021    Elevated LFTs    Other specified abnormal findings of blood chemistry 10/19/2021    Elevated LFTs    Pain in right knee     Right knee pain    Personal history of other diseases of the digestive system     History of hiatal hernia    Personal history of other diseases of the musculoskeletal system and connective tissue 01/17/2020    History of osteopenia    Personal history of other diseases of the musculoskeletal system and connective tissue     History of  herniated intervertebral disc    Personal history of other diseases of the musculoskeletal system and connective tissue     History of chronic back pain    Personal history of other diseases of the nervous system and sense organs     History of cataract    Personal history of other diseases of the nervous system and sense organs     History of glaucoma    Personal history of other diseases of the respiratory system     History of allergic rhinitis    Personal history of other endocrine, nutritional and metabolic disease     History of hyperlipidemia    Personal history of other endocrine, nutritional and metabolic disease     History of Graves' disease    Personal history of other endocrine, nutritional and metabolic disease     History of hypoglycemia    Personal history of other malignant neoplasm of skin     History of malignant neoplasm of skin    Personal history of other mental and behavioral disorders     History of anxiety    Presence of right artificial knee joint 09/21/2017    Scoliosis 2008    Spinal stenosis     Vision loss     Visual impairment 2008    Wears partial dentures     upper    Wrist sprain 03/10/2023        Past Surgical History:   Procedure Laterality Date    HIP ARTHROPLASTY      right    JOINT REPLACEMENT Bilateral 2017-18    knees    KNEE ARTHROSCOPY W/ DEBRIDEMENT  07/09/2016    Arthroscopy Knee    OTHER SURGICAL HISTORY  10/14/2019    Excision of squamous cell carcinoma        Family History   Problem Relation Name Age of Onset    Diabetes Mother Jaci Zachery         Allergies   Allergen Reactions    House Dust Hives    Adhesive Tape-Silicones Other     ADHESIVES CAUSES BLISTERS    Cat Dander Unknown    Penicillins Hives        MR lumbar spine wo IV contrast 12/31/2024    Narrative  Interpreted By:  Amrita Pandya,  STUDY:  MR LUMBAR SPINE WO IV CONTRAST    INDICATION:  Signs/Symptoms:lumbar radicular pain    COMPARISON:    Lumbar spine MRI 01/25/2023    ACCESSION  NUMBER(S):  [id number]    ORDERING CLINICIAN:  CHRISTOFER REYES    TECHNIQUE:  Multiplanar multisequence MRI of the lumbar spine was performed  without the administration of intravenous contrast, according to  standard protocol.    FINDINGS:  ALIGNMENT: Straightening of usual lumbar lordosis. Levoconvex  scoliosis centered at L1-2. Mild retrolisthesis of L3 on L4 and L5 on  S1.  leftward rotational scoliosis of the lumbar spine.    VERTEBRAE: Mild Modic type 1 endplate degenerative change at L2-3.  Mixed Modic type 1 and type 2 endplate degenerative change at L4-5.  Mild Modic type 1 endplate degenerative change at L5-S1. No acute  fracture or aggressive osseous lesion. Multilevel Schmorl's nodes  throughout the lumbar spine noted. Anterior bridging osteophytes  throughout the lumbar spine noted.    DISCS: Multilevel disc desiccation and severe disc height loss most  notably involving the right aspect of the L2-3 and L3-4 discs as well  as left aspects of the L4-5 and L5-S1 discs.    CONUS MEDULLARIS AND CAUDA EQUINA: The conus medullaris terminates at  L1. Crowding versus mass-effect on the cauda equina nerve roots at  L3-4, similar to previous MRI 01/25/2023. Cauda equina nerve roots  are otherwise unremarkable.    PARAVERTEBRAL SOFT TISSUES AND VISUALIZED RETROPERITONEUM: The  visualized paravertebral soft tissues appear within normal limits.    EVALUATION OF INDIVIDUAL LEVELS:  L1-2: Disc bulge asymmetric to the right with facet hypertrophy  results in moderate right foraminal stenosis as well as monitoring of  the spinal canal. Left neural foramen is patent.    L2-3: Disc osteophyte complex asymmetric to the right with facet  hypertrophy results in moderate right and minimal left foraminal  stenosis. There is moderate narrowing of the spinal canal and  crowding of the right-greater-than-left subarticular recesses.    L3-4: Disc osteophyte complex with facet hypertrophy and infolding of  ligamentum flavum results  in severe canal and moderate to severe left  foraminal stenosis. There is mild-to-moderate right foraminal  stenosis.    L4-5: Disc osteophyte complex asymmetric to the left with facet  hypertrophy and infolding of ligamentum flavum. There is moderate  narrowing of the spinal canal with effacement of the left  subarticular recess likely contacting the traversing left L5 nerve  root. There is moderate left and mild right foraminal stenosis.    L5-S1: Disc bulge with superimposed central/left paracentral disc  protrusion containing annular fissure effacing the left subarticular  recess and likely contacting the traversing left S1 nerve root. There  is moderate left-greater-than-right foraminal stenosis. Spinal canal  is minimally narrowed.    LIMITED EVALUATION OF UPPER SACRUM AND SACROILIAC JOINTS: Mild  degenerative changes of the sacroiliac joints.    Impression  Multilevel degenerative changes of the lumbar spine most notable at  L3-4 where there is severe canal stenosis resulting in crowding  versus mass-effect on the cauda equina nerve roots. Additional  central/left paracentral disc protrusion containing annular fissure  L5-S1 likely contacting the traversing left S1 nerve root. Findings  are stable compared to previous MRI 01/25/2023.    Levoconvex scoliosis centered at L1-2 and leftward rotational  scoliosis noted.    Signed by: Amrita Pandya 1/2/2025 2:56 PM  Dictation workstation:   [id number]      MR LUMBAR SPINE WO IV CONTRAST 01/25/2023    Narrative  MRN: [id number]  Patient Name: CHELY CALL    STUDY:  MRI L-SPINE WO;  1/25/2023 4:22 pm    INDICATION:  worsening low back/radicular pain with LE weakness  M48.061: Spinal  stenosis, lumbar.    COMPARISON:  MRI lumbar spine 12/10/2019    ACCESSION NUMBER(S):  [id number]    ORDERING CLINICIAN:  CHRISTOFER REYES    TECHNIQUE:  Sagittal T1, T2, STIR, axial T1 and T2 weighted images of the lumbar  spine were acquired.    FINDINGS:  Alignment: Straightening of the  lumbar lordosis. Moderate  levoscoliosis centered at L1-L2.    Vertebrae/Intervertebral Discs: The vertebral bodies demonstrate  expected height.The marrow signal is within normal limits. Multilevel  loss of disc space throughout the lumbar spine.    Conus: The lower thoracic cord appears unremarkable. The conus  terminates at L1.    Multilevel degenerative changes of the lumbar spine including  endplate remodeling, disc bulges, ligamentum flavum hypertrophy facet  degenerative changes and vacuum phenomenon.    T12-L1: Disc bulge asymmetric to the left, ligamentum flavum  hypertrophy and facet degenerative changes with mild canal stenosis.  Mild bilateral neural foraminal narrowing.    L1-2: Disc bulge asymmetric to the right, ligamentum flavum  hypertrophy and facet degenerative changes with mild canal stenosis.  Right subarticular recess narrowing. Mild right neural foraminal  narrowing. Left neural foramen is patent.    L2-3: Disc bulge asymmetric to the right, ligamentum flavum  hypertrophy and facet degenerative changes with mild canal stenosis.  Right subarticular recess narrowing. Moderate right neural foraminal  narrowing. Left neural foramen is patent.    L3-4: Disc bulge asymmetric to the right, ligamentum flavum  hypertrophy and facet degenerative change with moderate canal  stenosis. Right-greater-than-left subarticular recess narrowing.  Moderate bilateral neural foraminal narrowing.    L4-5: Disc bulge asymmetric to the left, ligamentum flavum  hypertrophy and facet degenerative change with moderate canal  stenosis. Bilateral subarticular recess narrowing. Moderate left and  mild right neural foraminal narrowing.    L5-S1: Disc bulge asymmetric to the left, ligamentum flavum  hypertrophy and facet degenerative changes with mild canal stenosis.  Left-greater-than-right subarticular recess narrowing.  Right-greater-than-left moderate neural foraminal narrowing.    The prevertebral and posterior  paraspinous soft tissues are  unremarkable.    Impression  1.  Moderate levoscoliosis centered at L1-L2.  2. Advanced multilevel degenerative changes of the lumbar spine with  up to moderate canal stenosis and neural foraminal narrowing, most  prominent at L3-L4 and L4-L5.  3. Multilevel subarticular recess narrowing.      Objective     Vitals:    03/10/25 1123   BP: 150/90   Pulse:    Resp:    SpO2:                Physical Exam    GENERAL EXAM  Vital Signs: Vital signs to include heart rate, respiration rate, blood pressure, and temperature were reviewed.  General Appearance:  Awake, alert, healthy appearing, well developed, No acute distress.  Head: Normocephalic without evidence of head injury.  Neck: The appearance of the neck was normal without swelling with a midline trachea.  Eyes: The eyelids and eyebrows exhibited no abnormalities.  Pupils were not pin-point.  Sclera was without icterus.  Lungs: Respiration rhythm and depth was normal.  Respiratory movements were normal without labored breathing.  Cardiovascular: No peripheral edema was present.    Neurological: Patient was oriented to time, place, and person.  Speech was normal.  Balance, gait, and stance were unremarkable.    Psychiatric: Appearance was normal with appropriate dress.  Mood was euthymic and affect was normal.  Skin: Affected regions were without ecchymosis or skin lesions.      Assessment/Plan   Diagnoses and all orders for this visit:  Long term prescription opiate use  -     Buprenorphine Confirm,Urine; Future  -     Tapentadol Confirmation, Urine; Future  -     Opiate/Opioid/Benzo Prescription Compliance; Future  -     Ethyl Glucuronide Screen To Confirm,U; Future  Spinal stenosis of lumbar region with neurogenic claudication  Neuropathic pain    80-year-old female with lower back pain with lower extremity claudication symptoms.  I did personally review her MRI of her lumbar spine which I discussed with her today which showed some  scoliosis and significant degenerative changes throughout her lumbar spine with the worst levels being L3-4 and L4-5 with some ligamentum flavum hypertrophy at both of those levels causing central canal stenosis.  Also do some foraminal stenosis but the central canal stenosis peers to be much worse.  We did have a brief discussion about MILD for the ligamentum flavum hypertrophy that this could potentially be an option at L3-4 and L4-5 however she needs to really have her vascular stenting done and her left total hip arthroplasty done first.    Plan:  - OARRS reviewed no issues, pill count appropriate and continues to receive good benefit from very low-dose opioid.  It is reasonable to continue with very low-dose oxycodone no more than 2/day.  - We will continue with duloxetine 90 mg daily, I do recommend she trial darting that at night given that she is having some drowsiness in the morning.  Also should continue to take gabapentin 300 mg 3 times daily.  Patient states she is good for another couple of weeks she will call for refills for all of her medications when appropriate  - As patient will be having upcoming total hip arthroplasty it would be appropriate for her to be managed with a higher dose of short acting opioid from her surgeon, I would recommend limiting that to no more than a week of increased dose.  In addition I do recommend that she continue on her duloxetine 90 mg daily and gabapentin 300 mg 3 times daily during the pre and perioperative phase of the total hip arthroplasty.  - As above if she could be a candidate for mild once all of these issues are taken care of above.  She can discuss that with my nurse practitioner at her next visit in 3 months.  - Urine drug screen today         This note was generated with the aid of dictation software, there may be typos despite my attempts at proofreading.

## 2025-03-11 ENCOUNTER — APPOINTMENT (OUTPATIENT)
Dept: PAIN MEDICINE | Facility: HOSPITAL | Age: 80
End: 2025-03-11
Payer: MEDICARE

## 2025-03-13 LAB
1OH-MIDAZOLAM UR-MCNC: NEGATIVE NG/ML
7AMINOCLONAZEPAM UR-MCNC: NEGATIVE NG/ML
A-OH ALPRAZ UR-MCNC: NEGATIVE NG/ML
A-OH-TRIAZOLAM UR-MCNC: NEGATIVE NG/ML
AMPHETAMINES UR QL: NEGATIVE NG/ML
BARBITURATES UR QL: NEGATIVE NG/ML
BUPRENORPHINE UR-MCNC: NEGATIVE NG/ML
BZE UR QL: NEGATIVE NG/ML
CODEINE UR-MCNC: NEGATIVE NG/ML
CREAT UR-MCNC: 37.9 MG/DL
DRUG SCREEN COMMENT UR-IMP: ABNORMAL
DRUG SCREEN COMMENT UR-IMP: NORMAL
DRUG SCREEN COMMENT UR-IMP: NORMAL
EDDP UR-MCNC: NEGATIVE NG/ML
ETHANOL UR QL: NEGATIVE NG/ML
FENTANYL UR-MCNC: NEGATIVE NG/ML
HYDROCODONE UR-MCNC: NEGATIVE NG/ML
HYDROMORPHONE UR-MCNC: NEGATIVE NG/ML
LORAZEPAM UR-MCNC: NEGATIVE NG/ML
METHADONE UR-MCNC: NEGATIVE NG/ML
MORPHINE UR-MCNC: NEGATIVE NG/ML
NORBUPRENORPHINE UR-MCNC: NEGATIVE NG/ML
NORDIAZEPAM UR-MCNC: NEGATIVE NG/ML
NORFENTANYL UR-MCNC: NEGATIVE NG/ML
NORHYDROCODONE UR CFM-MCNC: NEGATIVE NG/ML
NOROXYCODONE UR CFM-MCNC: 956 NG/ML
NORTAPENTADOL UR-MCNC: NEGATIVE NG/ML
NORTRAMADOL UR-MCNC: NEGATIVE NG/ML
OH-ETHYLFLURAZ UR-MCNC: NEGATIVE NG/ML
OXAZEPAM UR-MCNC: NEGATIVE NG/ML
OXIDANTS UR QL: NEGATIVE MCG/ML
OXYCODONE UR CFM-MCNC: 600 NG/ML
OXYMORPHONE UR CFM-MCNC: 133 NG/ML
PCP UR QL: NEGATIVE NG/ML
PH UR: 7 [PH] (ref 4.5–9)
QUEST 6 ACETYLMORPHINE: NEGATIVE NG/ML
QUEST NALOXONE, URINE: NEGATIVE NG/ML
QUEST NOTES AND COMMENTS: NORMAL
QUEST ZOLPIDEM: NEGATIVE NG/ML
TAPENTADOL UR-MCNC: NEGATIVE NG/ML
TEMAZEPAM UR-MCNC: NEGATIVE NG/ML
THC UR QL: NEGATIVE NG/ML
TRAMADOL UR-MCNC: NEGATIVE NG/ML
ZOLPIDEM PHENYL-4-CARB UR CFM-MCNC: NEGATIVE NG/ML

## 2025-03-17 DIAGNOSIS — M79.2 NEUROPATHIC PAIN: ICD-10-CM

## 2025-03-17 DIAGNOSIS — M47.816 LUMBAR SPONDYLOSIS: ICD-10-CM

## 2025-03-17 DIAGNOSIS — M48.061 SPINAL STENOSIS OF LUMBAR REGION, UNSPECIFIED WHETHER NEUROGENIC CLAUDICATION PRESENT: ICD-10-CM

## 2025-03-17 DIAGNOSIS — M48.062 SPINAL STENOSIS OF LUMBAR REGION WITH NEUROGENIC CLAUDICATION: ICD-10-CM

## 2025-03-17 DIAGNOSIS — M54.16 LUMBAR RADICULOPATHY: ICD-10-CM

## 2025-03-17 NOTE — TELEPHONE ENCOUNTER
Pt is requesting refill of  oxycodone 5 mg 1 tablet po BID Kate Waddell                                                         LV:   3/10/25                 NV:   6/4/25               OARRS reviewed with LFD:    2/15/25 #60/30 days                        Pended RX to MIKHAIL Hermosillo for transmission to pharmacy.

## 2025-03-17 NOTE — TELEPHONE ENCOUNTER
Pt is requesting refill of  Oxycodone 5 mg 1 tablet po BID Awa Waddell                                                         LV:   3/10/25                 NV:   6/4/25               OARRS reviewed with LFD:   2/15/25 #60/30 days                         Pended RX to MIKHAIL Hermosillo for transmission to pharmacy.

## 2025-03-17 NOTE — TELEPHONE ENCOUNTER
Pt is requesting refill of   duloxetine 30 mg 1 capsule daily; duloxetine 60 mg 1 capsule daily and gabapentin 100 mg - take 3 capsules TID Awa                                                        LV: 3/10/25                  NV:  6/4/25                                      Pended RX to MIKHAIL Hermosillo for transmission to pharmacy.

## 2025-03-18 RX ORDER — DULOXETIN HYDROCHLORIDE 30 MG/1
30 CAPSULE, DELAYED RELEASE ORAL DAILY
Qty: 90 CAPSULE | Refills: 1 | Status: SHIPPED | OUTPATIENT
Start: 2025-03-18 | End: 2025-06-16

## 2025-03-18 RX ORDER — GABAPENTIN 100 MG/1
300 CAPSULE ORAL 3 TIMES DAILY
Qty: 810 CAPSULE | Refills: 0 | Status: SHIPPED | OUTPATIENT
Start: 2025-03-18 | End: 2025-06-16

## 2025-03-18 RX ORDER — DULOXETIN HYDROCHLORIDE 60 MG/1
60 CAPSULE, DELAYED RELEASE ORAL DAILY
Qty: 90 CAPSULE | Refills: 1 | Status: SHIPPED | OUTPATIENT
Start: 2025-03-18 | End: 2025-06-16

## 2025-03-18 RX ORDER — OXYCODONE HYDROCHLORIDE 5 MG/1
5 TABLET ORAL 2 TIMES DAILY PRN
Qty: 60 TABLET | Refills: 0 | Status: SHIPPED | OUTPATIENT
Start: 2025-03-18 | End: 2025-04-17

## 2025-03-18 NOTE — TELEPHONE ENCOUNTER
Okay to refill duloxetine 30 mg and 60 mg daily for total of 90 mg daily.  Okay to refill gabapentin 100 mg 3 capsules 3 times daily consistent with the way the patient takes the medication.

## 2025-03-19 ENCOUNTER — TELEPHONE (OUTPATIENT)
Dept: CARDIOLOGY | Facility: HOSPITAL | Age: 80
End: 2025-03-19
Payer: MEDICARE

## 2025-03-19 ENCOUNTER — APPOINTMENT (OUTPATIENT)
Dept: RADIOLOGY | Facility: CLINIC | Age: 80
End: 2025-03-19
Payer: MEDICARE

## 2025-03-19 NOTE — TELEPHONE ENCOUNTER
Pt called to request results of most recent stress echo completed 3/7. Pt states Dr. William recommended repeat stress echo to compare results to one completed Jan 2025. Informed pt I will route this message to care team and we will be in touch to relay results and next steps. Pt verbalized understanding.

## 2025-03-19 NOTE — TELEPHONE ENCOUNTER
3/20/25  1405  Called results to patient with comparison to last stress echo and recommended for her to keep up with preventative measures such as diet, exercise, medications.    Patient verbalized understanding.      3/19/25  1413  Called patient; line busy.  Will try later.

## 2025-03-21 ENCOUNTER — APPOINTMENT (OUTPATIENT)
Dept: RADIOLOGY | Facility: HOSPITAL | Age: 80
End: 2025-03-21
Payer: MEDICARE

## 2025-03-21 ENCOUNTER — HOSPITAL ENCOUNTER (OUTPATIENT)
Facility: HOSPITAL | Age: 80
Setting detail: OUTPATIENT SURGERY
Discharge: HOME | End: 2025-03-21
Attending: SURGERY | Admitting: SURGERY
Payer: MEDICARE

## 2025-03-21 ENCOUNTER — ANESTHESIA (OUTPATIENT)
Dept: OPERATING ROOM | Facility: HOSPITAL | Age: 80
End: 2025-03-21
Payer: MEDICARE

## 2025-03-21 VITALS
HEIGHT: 66 IN | SYSTOLIC BLOOD PRESSURE: 144 MMHG | DIASTOLIC BLOOD PRESSURE: 82 MMHG | OXYGEN SATURATION: 96 % | BODY MASS INDEX: 25.07 KG/M2 | RESPIRATION RATE: 18 BRPM | TEMPERATURE: 98.4 F | HEART RATE: 99 BPM | WEIGHT: 156 LBS

## 2025-03-21 DIAGNOSIS — I87.2 VENOUS INSUFFICIENCY: Primary | ICD-10-CM

## 2025-03-21 PROCEDURE — 7100000002 HC RECOVERY ROOM TIME - EACH INCREMENTAL 1 MINUTE: Performed by: SURGERY

## 2025-03-21 PROCEDURE — 3600000004 HC OR TIME - INITIAL BASE CHARGE - PROCEDURE LEVEL FOUR: Performed by: SURGERY

## 2025-03-21 PROCEDURE — 76937 US GUIDE VASCULAR ACCESS: CPT | Performed by: SURGERY

## 2025-03-21 PROCEDURE — 76000 FLUOROSCOPY <1 HR PHYS/QHP: CPT

## 2025-03-21 PROCEDURE — 7100000010 HC PHASE TWO TIME - EACH INCREMENTAL 1 MINUTE: Performed by: SURGERY

## 2025-03-21 PROCEDURE — 2720000007 HC OR 272 NO HCPCS: Performed by: SURGERY

## 2025-03-21 PROCEDURE — C1753 CATH, INTRAVAS ULTRASOUND: HCPCS | Performed by: SURGERY

## 2025-03-21 PROCEDURE — 2500000004 HC RX 250 GENERAL PHARMACY W/ HCPCS (ALT 636 FOR OP/ED): Performed by: SURGERY

## 2025-03-21 PROCEDURE — 2500000004 HC RX 250 GENERAL PHARMACY W/ HCPCS (ALT 636 FOR OP/ED)

## 2025-03-21 PROCEDURE — 3600000009 HC OR TIME - EACH INCREMENTAL 1 MINUTE - PROCEDURE LEVEL FOUR: Performed by: SURGERY

## 2025-03-21 PROCEDURE — 75822 VEIN X-RAY ARMS/LEGS: CPT | Performed by: SURGERY

## 2025-03-21 PROCEDURE — C1769 GUIDE WIRE: HCPCS | Performed by: SURGERY

## 2025-03-21 PROCEDURE — 2500000004 HC RX 250 GENERAL PHARMACY W/ HCPCS (ALT 636 FOR OP/ED): Mod: JZ | Performed by: PHYSICIAN ASSISTANT

## 2025-03-21 PROCEDURE — 36011 PLACE CATHETER IN VEIN: CPT | Performed by: SURGERY

## 2025-03-21 PROCEDURE — 7100000009 HC PHASE TWO TIME - INITIAL BASE CHARGE: Performed by: SURGERY

## 2025-03-21 PROCEDURE — 7100000001 HC RECOVERY ROOM TIME - INITIAL BASE CHARGE: Performed by: SURGERY

## 2025-03-21 PROCEDURE — 3700000001 HC GENERAL ANESTHESIA TIME - INITIAL BASE CHARGE: Performed by: SURGERY

## 2025-03-21 PROCEDURE — 37252 INTRVASC US NONCORONARY 1ST: CPT | Performed by: SURGERY

## 2025-03-21 PROCEDURE — 3700000002 HC GENERAL ANESTHESIA TIME - EACH INCREMENTAL 1 MINUTE: Performed by: SURGERY

## 2025-03-21 PROCEDURE — 2500000004 HC RX 250 GENERAL PHARMACY W/ HCPCS (ALT 636 FOR OP/ED): Performed by: ANESTHESIOLOGY

## 2025-03-21 PROCEDURE — 2550000001 HC RX 255 CONTRASTS: Performed by: SURGERY

## 2025-03-21 PROCEDURE — 37253 INTRVASC US NONCORONARY ADDL: CPT | Performed by: SURGERY

## 2025-03-21 RX ORDER — SODIUM CHLORIDE, SODIUM LACTATE, POTASSIUM CHLORIDE, CALCIUM CHLORIDE 600; 310; 30; 20 MG/100ML; MG/100ML; MG/100ML; MG/100ML
100 INJECTION, SOLUTION INTRAVENOUS CONTINUOUS
Status: DISCONTINUED | OUTPATIENT
Start: 2025-03-21 | End: 2025-03-21 | Stop reason: HOSPADM

## 2025-03-21 RX ORDER — DIPHENHYDRAMINE HYDROCHLORIDE 50 MG/ML
12.5 INJECTION, SOLUTION INTRAMUSCULAR; INTRAVENOUS ONCE AS NEEDED
Status: DISCONTINUED | OUTPATIENT
Start: 2025-03-21 | End: 2025-03-21 | Stop reason: HOSPADM

## 2025-03-21 RX ORDER — IODIXANOL 270 MG/ML
INJECTION, SOLUTION INTRAVASCULAR AS NEEDED
Status: DISCONTINUED | OUTPATIENT
Start: 2025-03-21 | End: 2025-03-21 | Stop reason: HOSPADM

## 2025-03-21 RX ORDER — HYDRALAZINE HYDROCHLORIDE 20 MG/ML
5 INJECTION INTRAMUSCULAR; INTRAVENOUS EVERY 30 MIN PRN
Status: DISCONTINUED | OUTPATIENT
Start: 2025-03-21 | End: 2025-03-21 | Stop reason: HOSPADM

## 2025-03-21 RX ORDER — LABETALOL HYDROCHLORIDE 5 MG/ML
5 INJECTION, SOLUTION INTRAVENOUS ONCE AS NEEDED
Status: DISCONTINUED | OUTPATIENT
Start: 2025-03-21 | End: 2025-03-21 | Stop reason: HOSPADM

## 2025-03-21 RX ORDER — OXYCODONE AND ACETAMINOPHEN 5; 325 MG/1; MG/1
1 TABLET ORAL EVERY 4 HOURS PRN
Status: DISCONTINUED | OUTPATIENT
Start: 2025-03-21 | End: 2025-03-21 | Stop reason: HOSPADM

## 2025-03-21 RX ORDER — DROPERIDOL 2.5 MG/ML
0.62 INJECTION, SOLUTION INTRAMUSCULAR; INTRAVENOUS ONCE AS NEEDED
Status: DISCONTINUED | OUTPATIENT
Start: 2025-03-21 | End: 2025-03-21 | Stop reason: HOSPADM

## 2025-03-21 RX ORDER — LIDOCAINE HYDROCHLORIDE 10 MG/ML
0.1 INJECTION, SOLUTION EPIDURAL; INFILTRATION; INTRACAUDAL; PERINEURAL ONCE
Status: DISCONTINUED | OUTPATIENT
Start: 2025-03-21 | End: 2025-03-21 | Stop reason: HOSPADM

## 2025-03-21 RX ORDER — ONDANSETRON HYDROCHLORIDE 2 MG/ML
4 INJECTION, SOLUTION INTRAVENOUS ONCE AS NEEDED
Status: DISCONTINUED | OUTPATIENT
Start: 2025-03-21 | End: 2025-03-21 | Stop reason: HOSPADM

## 2025-03-21 RX ORDER — FAMOTIDINE 10 MG/ML
20 INJECTION, SOLUTION INTRAVENOUS ONCE
Status: COMPLETED | OUTPATIENT
Start: 2025-03-21 | End: 2025-03-21

## 2025-03-21 RX ORDER — DIPHENHYDRAMINE HYDROCHLORIDE 50 MG/ML
25 INJECTION, SOLUTION INTRAMUSCULAR; INTRAVENOUS ONCE
Status: DISCONTINUED | OUTPATIENT
Start: 2025-03-21 | End: 2025-03-21 | Stop reason: HOSPADM

## 2025-03-21 RX ORDER — MEPERIDINE HYDROCHLORIDE 25 MG/ML
12.5 INJECTION INTRAMUSCULAR; INTRAVENOUS; SUBCUTANEOUS EVERY 10 MIN PRN
Status: DISCONTINUED | OUTPATIENT
Start: 2025-03-21 | End: 2025-03-21 | Stop reason: HOSPADM

## 2025-03-21 RX ORDER — MORPHINE SULFATE 2 MG/ML
2 INJECTION, SOLUTION INTRAMUSCULAR; INTRAVENOUS EVERY 5 MIN PRN
Status: DISCONTINUED | OUTPATIENT
Start: 2025-03-21 | End: 2025-03-21 | Stop reason: HOSPADM

## 2025-03-21 RX ORDER — FENTANYL CITRATE 50 UG/ML
INJECTION, SOLUTION INTRAMUSCULAR; INTRAVENOUS AS NEEDED
Status: DISCONTINUED | OUTPATIENT
Start: 2025-03-21 | End: 2025-03-21

## 2025-03-21 RX ORDER — ALBUTEROL SULFATE 0.83 MG/ML
2.5 SOLUTION RESPIRATORY (INHALATION) ONCE AS NEEDED
Status: DISCONTINUED | OUTPATIENT
Start: 2025-03-21 | End: 2025-03-21 | Stop reason: HOSPADM

## 2025-03-21 RX ORDER — PROPOFOL 10 MG/ML
INJECTION, EMULSION INTRAVENOUS AS NEEDED
Status: DISCONTINUED | OUTPATIENT
Start: 2025-03-21 | End: 2025-03-21

## 2025-03-21 RX ORDER — MIDAZOLAM HYDROCHLORIDE 1 MG/ML
INJECTION, SOLUTION INTRAMUSCULAR; INTRAVENOUS AS NEEDED
Status: DISCONTINUED | OUTPATIENT
Start: 2025-03-21 | End: 2025-03-21

## 2025-03-21 RX ORDER — CLINDAMYCIN PHOSPHATE 600 MG/50ML
600 INJECTION, SOLUTION INTRAVENOUS ONCE
Status: COMPLETED | OUTPATIENT
Start: 2025-03-21 | End: 2025-03-21

## 2025-03-21 RX ADMIN — SODIUM CHLORIDE, POTASSIUM CHLORIDE, SODIUM LACTATE AND CALCIUM CHLORIDE: 600; 310; 30; 20 INJECTION, SOLUTION INTRAVENOUS at 08:41

## 2025-03-21 RX ADMIN — PROPOFOL 180 MG: 10 INJECTION, EMULSION INTRAVENOUS at 08:49

## 2025-03-21 RX ADMIN — HYDROMORPHONE HYDROCHLORIDE 0.5 MG: 0.5 INJECTION, SOLUTION INTRAMUSCULAR; INTRAVENOUS; SUBCUTANEOUS at 09:26

## 2025-03-21 RX ADMIN — HYDROMORPHONE HYDROCHLORIDE 0.5 MG: 0.5 INJECTION, SOLUTION INTRAMUSCULAR; INTRAVENOUS; SUBCUTANEOUS at 09:35

## 2025-03-21 RX ADMIN — FENTANYL CITRATE 75 MCG: 50 INJECTION INTRAMUSCULAR; INTRAVENOUS at 08:54

## 2025-03-21 RX ADMIN — FENTANYL CITRATE 25 MCG: 50 INJECTION INTRAMUSCULAR; INTRAVENOUS at 08:49

## 2025-03-21 RX ADMIN — FAMOTIDINE 20 MG: 10 INJECTION, SOLUTION INTRAVENOUS at 08:19

## 2025-03-21 RX ADMIN — MIDAZOLAM 2 MG: 1 INJECTION INTRAMUSCULAR; INTRAVENOUS at 08:46

## 2025-03-21 RX ADMIN — CLINDAMYCIN PHOSPHATE 600 MG: 600 INJECTION, SOLUTION INTRAVENOUS at 08:55

## 2025-03-21 SDOH — HEALTH STABILITY: MENTAL HEALTH: CURRENT SMOKER: 0

## 2025-03-21 ASSESSMENT — PAIN SCALES - GENERAL
PAINLEVEL_OUTOF10: 8
PAINLEVEL_OUTOF10: 2
PAINLEVEL_OUTOF10: 1
PAINLEVEL_OUTOF10: 7
PAINLEVEL_OUTOF10: 7
PAINLEVEL_OUTOF10: 1
PAINLEVEL_OUTOF10: 2
PAINLEVEL_OUTOF10: 2
PAINLEVEL_OUTOF10: 1
PAINLEVEL_OUTOF10: 4
PAINLEVEL_OUTOF10: 0 - NO PAIN
PAINLEVEL_OUTOF10: 1
PAINLEVEL_OUTOF10: 2
PAIN_LEVEL: 2
PAINLEVEL_OUTOF10: 8

## 2025-03-21 ASSESSMENT — COLUMBIA-SUICIDE SEVERITY RATING SCALE - C-SSRS
1. IN THE PAST MONTH, HAVE YOU WISHED YOU WERE DEAD OR WISHED YOU COULD GO TO SLEEP AND NOT WAKE UP?: NO
6. HAVE YOU EVER DONE ANYTHING, STARTED TO DO ANYTHING, OR PREPARED TO DO ANYTHING TO END YOUR LIFE?: NO
2. HAVE YOU ACTUALLY HAD ANY THOUGHTS OF KILLING YOURSELF?: NO

## 2025-03-21 ASSESSMENT — PAIN - FUNCTIONAL ASSESSMENT
PAIN_FUNCTIONAL_ASSESSMENT: 0-10
PAIN_FUNCTIONAL_ASSESSMENT: 0-10

## 2025-03-21 NOTE — ANESTHESIA PREPROCEDURE EVALUATION
Patient: Robyn Madera    Procedure Information       Date/Time: 03/21/25 0848    Procedure: BILATERAL LOWER EXTREMITY VENOGRAM WITH INTRAVASCULAR ULTRASOUND, POSSIBLE STENT *C-ARM* TESSIE WITH MEDTRONIC  LORENZO WITH GERMAIN (IVUS) (Bilateral) - 60 MINUTES PROCEDURE TIME    Location: [facility] / [facility]    Surgeons: Jagjit Tolbert, DO            Relevant Problems   Anesthesia (within normal limits)      Cardiac   (+) Benign essential hypertension   (+) Mixed hyperlipidemia   (+) PVC's (premature ventricular contractions)   (+) RBBB (right bundle branch block with left anterior fascicular block)      Neuro   (+) Acute anxiety   (+) Chronic lumbar radiculopathy   (+) Generalized anxiety disorder      GI   (+) Gastroesophageal reflux disease   (+) Hiatal hernia      Endocrine   (+) Multiple thyroid nodules   (+) Subclinical hyperthyroidism      Musculoskeletal   (+) Lumbar scoliosis   (+) Lumbosacral spondylosis   (+) Primary osteoarthritis of left knee   (+) Primary osteoarthritis of right hip   (+) Spinal stenosis of lumbar region      HEENT   (+) Bilateral hearing loss       Clinical information reviewed:   Tobacco  Allergies  Meds   Med Hx  Surg Hx   Fam Hx          NPO Detail:  No data recorded     Physical Exam    Airway  Mallampati: II  TM distance: >3 FB  Neck ROM: full     Cardiovascular - normal exam     Dental   (+) upper dentures, lower dentures     Pulmonary - normal exam     Abdominal - normal exam           Anesthesia Plan    History of general anesthesia?: yes  History of complications of general anesthesia?: no    ASA 3     MAC     The patient is not a current smoker.    intravenous induction   Anesthetic plan and risks discussed with patient.    Plan discussed with CRNA.

## 2025-03-21 NOTE — ANESTHESIA POSTPROCEDURE EVALUATION
Patient: Robyn Madera    Procedure Summary       Date: 03/21/25 Room / Location: [facility] 08 / Virtual [facility]    Anesthesia Start: 0849 Anesthesia Stop: 0930    Procedure: DIAGNOSTIC BILATERAL LOWER EXTREMITY VENOGRAM WITH INTRAVASCULAR ULTRASOUND *C-ARM* TESSIE WITH Cyprotex  LORENZO WITH GERMAIN (IVUS) (Bilateral) Diagnosis:       Venous insufficiency      (I87.2)    Surgeons: Jagjit Tolbert DO Responsible Provider: JARED Woodruff    Anesthesia Type: MAC ASA Status: 3            Anesthesia Type: MAC    Vitals Value Taken Time   /86 03/21/25 1130   Temp 36.9 °C (98.4 °F) 03/21/25 1130   Pulse 105 03/21/25 1130   Resp 15 03/21/25 1130   SpO2 99 % 03/21/25 1130       Anesthesia Post Evaluation    Patient location during evaluation: PACU  Patient participation: complete - patient participated  Level of consciousness: awake  Pain score: 2  Pain management: adequate  Airway patency: patent  Cardiovascular status: acceptable  Respiratory status: acceptable  Hydration status: acceptable  Postoperative Nausea and Vomiting: none    There were no known notable events for this encounter.

## 2025-03-21 NOTE — DISCHARGE INSTRUCTIONS
Leave dressing in place 48 hours  Light activity until seen in office  Diet as tolerated  Okay to shower in 48 hours   Call with questions or concerns

## 2025-03-21 NOTE — OP NOTE
DIAGNOSTIC BILATERAL LOWER EXTREMITY VENOGRAM WITH INTRAVASCULAR ULTRASOUND *C-ARM* KURTZ WITH MEDTRONIC  LORENZO WITH GERMAIN (IVUS) (B) Operative Note     Date: 3/21/2025  OR Location: [facility]    Name: Robyn Madera, : 1945, Age: 80 y.o., MRN: [id number], Sex: female    Diagnosis  Pre-op Diagnosis      * Venous insufficiency [I87.2] Post-op Diagnosis     * Venous insufficiency [I87.2]     Procedures  DIAGNOSTIC BILATERAL LOWER EXTREMITY VENOGRAM WITH INTRAVASCULAR ULTRASOUND *C-ARM* KURTZ WITH MEDTRONIC  LORENZO WITH GERMAIN (IVUS)  31190 - [place] [facility] CATH PLMT VANDA SYS 1ST ORDER BRANCH    DIAGNOSTIC BILATERAL LOWER EXTREMITY VENOGRAM WITH INTRAVASCULAR ULTRASOUND *C-ARM* KURTZ WITH MEDTRONIC  LORENZO WITH GERMAIN (IVUS)  00674 - [place] INTRAVASCULAR US NONCORONARY RS&I INTIAL VESSEL    DIAGNOSTIC BILATERAL LOWER EXTREMITY VENOGRAM WITH INTRAVASCULAR ULTRASOUND *C-ARM* KURTZ WITH MEDTRONIC  LORENZO WITH GERMAIN (IVUS)  53355 - [facility] VENOGRAPHY EXTREMITY BILATERAL RS&I    DIAGNOSTIC BILATERAL LOWER EXTREMITY VENOGRAM WITH INTRAVASCULAR ULTRASOUND *C-ARM* KURTZ WITH MEDTRONIC  LORENZO WITH GERMAIN (IVUS)  73500 - [place] INTRAVASCULAR US NONCORONARY RS&I ADDL VESSEL      Surgeons      * Jagjit Tolbert - Primary    Resident/Fellow/Other Assistant:  Surgeons and Role:     * ZOILA Caraballo-ADRIANE - DANG First Assist    Staff:   Raheem: Evelyn  Scrub Person: Batsheva    Anesthesia Staff: CRNA: ELENI Woodruff-CRNA    Procedure Summary  Anesthesia: Monitor Anesthesia Care  ASA: III  Estimated Blood Loss: 10mL  Intra-op Medications:   Administrations occurring from 0848 to 1028 on 25:   Medication Name Total Dose   heparin (porcine) 5,000 Units in sodium chloride 0.9% 500 mL irrigation 5,000 Units   iodixanol (VISIPaque) 270 mg iodine/mL injection 20 mL   HYDROmorphone (Dilaudid) injection 0.5 mg 1 mg   clindamycin (Cleocin) 600 mg in dextrose 5% IV 50 mL 600 mg   dexmedeTOMIDine (Precedex) bolus from bag 28 mcg   fentaNYL  (Sublimaze) injection 50 mcg/mL 100 mcg   propofol (Diprivan) injection 10 mg/mL 180 mg              Anesthesia Record               Intraprocedure I/O Totals          Intake    Dexmedetomidine 0.00 mL    The total shown is the total volume documented since Anesthesia Start was filed.    Total Intake 0 mL          Specimen: No specimens collected              Drains and/or Catheters: * None in log *    Tourniquet Times:         Implants:     Findings: normal venogram    Indications: Robyn Madera is an 80 y.o. female who is having surgery for I87.2. Left leg swelling    The patient was seen in the preoperative area. The risks, benefits, complications, treatment options, non-operative alternatives, expected recovery and outcomes were discussed with the patient. The possibilities of reaction to medication, pulmonary aspiration, injury to surrounding structures, bleeding, recurrent infection, the need for additional procedures, failure to diagnose a condition, and creating a complication requiring transfusion or operation were discussed with the patient. The patient concurred with the proposed plan, giving informed consent.  The site of surgery was properly noted/marked if necessary per policy. The patient has been actively warmed in preoperative area. Preoperative antibiotics have been ordered and given within 1 hours of incision. Venous thrombosis prophylaxis are not indicated.    Procedure Details: ***  Complications:  None; patient tolerated the procedure well.    Disposition: PACU - hemodynamically stable.  Condition: stable           Additional Details: No qualified vascular fellow was available for assistance in the above-noted procedure.  ZOILA Pathak was available for the entirety of the procedure.  She assisted in all components of the procedure from start to completion.     Attending Attestation: {Op note attestation (Optional):[id number]}    Jagjit Tolbert  Phone Number: [phone]       284 mm², left common iliac vein surface reference was   mm²  221  left external  iliac surface reference was 163 mm²,  left common femoral vein surface reference was 102 mm².  Ultrasound guidance was utilized to access the right femoral vein Bentson wire to the vena cava with no difficulty sheath was advanced contrast venography was performed demonstrated no evidence of intraluminal filling defect in the iliac circuit.  The right common iliac surface reference was 210 mm², right extrailiac vein surface reference was 193 mm², the right common femoral vein surface reference was   mm².   No significant compression is noted in both iliac venous circuits.  At the termination procedure all catheter wires were removed manual compression was applied excellent hemostasis maintained patient will consent the PACU in stable condition.        Complications:  None; patient tolerated the procedure well.    Disposition: PACU - hemodynamically stable.  Condition: stable           Additional Details: No qualified vascular fellow was available for assistance in the above-noted procedure.  ZOILA Pathak was available for the entirety of the procedure.  She assisted in all components of the procedure from start to completion.     Attending Attestation:     Jagjit Tolbert  Phone Number: [phone]

## 2025-03-25 ENCOUNTER — HOSPITAL ENCOUNTER (OUTPATIENT)
Dept: RADIOLOGY | Facility: CLINIC | Age: 80
Discharge: HOME | End: 2025-03-25
Payer: MEDICARE

## 2025-03-25 DIAGNOSIS — Z78.0 MENOPAUSE: ICD-10-CM

## 2025-03-25 DIAGNOSIS — E05.90 SUBCLINICAL HYPERTHYROIDISM: ICD-10-CM

## 2025-03-25 PROCEDURE — 77080 DXA BONE DENSITY AXIAL: CPT | Performed by: RADIOLOGY

## 2025-03-25 PROCEDURE — 77080 DXA BONE DENSITY AXIAL: CPT

## 2025-04-01 ENCOUNTER — OFFICE VISIT (OUTPATIENT)
Facility: HOSPITAL | Age: 80
End: 2025-04-01
Payer: MEDICARE

## 2025-04-01 DIAGNOSIS — R35.1 NOCTURIA: ICD-10-CM

## 2025-04-01 DIAGNOSIS — N32.81 OAB (OVERACTIVE BLADDER): Primary | ICD-10-CM

## 2025-04-01 PROCEDURE — 99214 OFFICE O/P EST MOD 30 MIN: CPT | Performed by: STUDENT IN AN ORGANIZED HEALTH CARE EDUCATION/TRAINING PROGRAM

## 2025-04-01 PROCEDURE — 1157F ADVNC CARE PLAN IN RCRD: CPT | Performed by: STUDENT IN AN ORGANIZED HEALTH CARE EDUCATION/TRAINING PROGRAM

## 2025-04-01 PROCEDURE — 1159F MED LIST DOCD IN RCRD: CPT | Performed by: STUDENT IN AN ORGANIZED HEALTH CARE EDUCATION/TRAINING PROGRAM

## 2025-04-01 PROCEDURE — 1123F ACP DISCUSS/DSCN MKR DOCD: CPT | Performed by: STUDENT IN AN ORGANIZED HEALTH CARE EDUCATION/TRAINING PROGRAM

## 2025-04-01 SDOH — ECONOMIC STABILITY: FOOD INSECURITY: WITHIN THE PAST 12 MONTHS, YOU WORRIED THAT YOUR FOOD WOULD RUN OUT BEFORE YOU GOT MONEY TO BUY MORE.: NEVER TRUE

## 2025-04-01 SDOH — ECONOMIC STABILITY: FOOD INSECURITY: WITHIN THE PAST 12 MONTHS, THE FOOD YOU BOUGHT JUST DIDN'T LAST AND YOU DIDN'T HAVE MONEY TO GET MORE.: NEVER TRUE

## 2025-04-01 ASSESSMENT — ENCOUNTER SYMPTOMS
DEPRESSION: 0
OCCASIONAL FEELINGS OF UNSTEADINESS: 1
LOSS OF SENSATION IN FEET: 1

## 2025-04-01 ASSESSMENT — COLUMBIA-SUICIDE SEVERITY RATING SCALE - C-SSRS
2. HAVE YOU ACTUALLY HAD ANY THOUGHTS OF KILLING YOURSELF?: NO
1. IN THE PAST MONTH, HAVE YOU WISHED YOU WERE DEAD OR WISHED YOU COULD GO TO SLEEP AND NOT WAKE UP?: NO

## 2025-04-01 NOTE — PROGRESS NOTES
HISTORY OF PRESENT ILLNESS:  Robyn Madera is a 80 y.o. female who presents today for a follow up visit. She is doing well. She has no current complaints.           Past Medical History  She has a past medical history of Abnormal findings on diagnostic imaging of heart and coronary circulation (11/07/2019), Abnormal levels of other serum enzymes (10/24/2019), Allergic rhinitis due to animal hair or dander (10/28/2013), Anxiety (2023), Arthritis (2016), Chronic constipation (2022), Combined form of senile cataract of both eyes (10/29/2014), Disease of thyroid gland (2018), Diverticulosis of colon (06/17/2013), Diverticulosis of intestine, part unspecified, without perforation or abscess without bleeding, Dizziness, Gastric polyp (06/23/2014), GERD (gastroesophageal reflux disease) (2008), Glaucoma suspect, both eyes (01/08/2015), HL (hearing loss) (2022), squamous cell carcinoma of skin (06/09/2015), Hyperlipidemia, Hypertension (2015), Internal hemorrhoid (03/21/2016), Irregular heart beat, Low TSH level (03/10/2023), Lung nodule, solitary (03/10/2023), Lung nodule, solitary (03/10/2023), Mild intermittent asthma without complication ([facility]) (02/24/2016), Other microscopic hematuria (03/01/2018), Other specified abnormal findings of blood chemistry (10/19/2021), Other specified abnormal findings of blood chemistry (10/19/2021), Pain in right knee, Personal history of other diseases of the digestive system, Personal history of other diseases of the musculoskeletal system and connective tissue (01/17/2020), Personal history of other diseases of the musculoskeletal system and connective tissue, Personal history of other diseases of the musculoskeletal system and connective tissue, Personal history of other diseases of the nervous system and sense organs, Personal history of other diseases of the nervous system and sense organs, Personal history of other diseases of the respiratory system, Personal history of  "other endocrine, nutritional and metabolic disease, Personal history of other endocrine, nutritional and metabolic disease, Personal history of other endocrine, nutritional and metabolic disease, Personal history of other malignant neoplasm of skin, Personal history of other mental and behavioral disorders, Presence of right artificial knee joint (09/21/2017), Scoliosis (2008), Spinal stenosis, Vision loss, Visual impairment (2008), Wears partial dentures, and Wrist sprain (03/10/2023).    Surgical History  She has a past surgical history that includes Knee arthroscopy w/ debridement (07/09/2016); Other surgical history (10/14/2019); Joint replacement (Bilateral, 2017-18); and Hip Arthroplasty.     Social History  She reports that she has never smoked. She has never used smokeless tobacco. She reports that she does not currently use alcohol. She reports that she does not use drugs.    Family History  Family History   Problem Relation Name Age of Onset    Diabetes Mother Jaci Bingham         Allergies  House dust, Adhesive tape-silicones, Cat dander, and Penicillins      A comprehensive 10+ review of systems was negative except for: see hpi                          PHYSICAL EXAMINATION:  BP Readings from Last 3 Encounters:   03/21/25 144/82   03/10/25 150/90   03/07/25 122/64      Wt Readings from Last 3 Encounters:   03/21/25 70.8 kg (156 lb)   03/10/25 71 kg (156 lb 9.6 oz)   03/07/25 70.4 kg (155 lb 4.8 oz)      BMI: Estimated body mass index is 25.18 kg/m² as calculated from the following:    Height as of 3/21/25: 1.676 m (5' 6\").    Weight as of 3/21/25: 70.8 kg (156 lb).  BSA: Estimated body surface area is 1.82 meters squared as calculated from the following:    Height as of 3/21/25: 1.676 m (5' 6\").    Weight as of 3/21/25: 70.8 kg (156 lb).  HEENT: Normocephalic, atraumatic, PER EOMI, nonicteric, trachea normal, thyroid normal, oropharynx normal.  CARDIAC: regular rate & rhythm, S1 & S2 normal.  No heaves, " thrills, gallops or murmurs.  LUNGS: Clear to auscultation, no spinal or CV tenderness.  EXTREMITIES: No evidence of cyanosis, clubbing or edema.               Assessment:  81 yo with microscopic hematuria and OAB      Nocturia/OAB  -improved with gemtesa, but too expensive, wants to stop and   Is noticing some improvement with Myrbetriq   clinical pharmacy referral   We discussed Vyvally device, may be interested  Discussed changing medication, or adding another medication  Rx fesoterodine in addition to myrbetriq   Can take both medications at bed time         Microhematuria:   Negative work-up, repeat UA ordered 12/17/24      Follow up in 6 months        All questions and concerns were answered and addressed.  The patient expressed understanding and agrees with the plan.     Gino Haas MD    Scribe Attestation  By signing my name below, I, Sera Vazquez, Scribe   attest that this documentation has been prepared under the direction and in the presence of Gino Haas MD.

## 2025-04-02 ENCOUNTER — OFFICE VISIT (OUTPATIENT)
Dept: VASCULAR SURGERY | Facility: HOSPITAL | Age: 80
End: 2025-04-02
Payer: MEDICARE

## 2025-04-02 VITALS
WEIGHT: 155 LBS | HEART RATE: 100 BPM | HEIGHT: 66 IN | SYSTOLIC BLOOD PRESSURE: 157 MMHG | BODY MASS INDEX: 24.91 KG/M2 | DIASTOLIC BLOOD PRESSURE: 79 MMHG

## 2025-04-02 DIAGNOSIS — I89.0 LYMPHEDEMA: Primary | ICD-10-CM

## 2025-04-02 DIAGNOSIS — I87.1 MAY-THURNER SYNDROME: ICD-10-CM

## 2025-04-02 DIAGNOSIS — I87.2 VENOUS INSUFFICIENCY: ICD-10-CM

## 2025-04-02 PROCEDURE — 3077F SYST BP >= 140 MM HG: CPT | Performed by: PHYSICIAN ASSISTANT

## 2025-04-02 PROCEDURE — 3078F DIAST BP <80 MM HG: CPT | Performed by: PHYSICIAN ASSISTANT

## 2025-04-02 PROCEDURE — 99213 OFFICE O/P EST LOW 20 MIN: CPT | Performed by: PHYSICIAN ASSISTANT

## 2025-04-02 PROCEDURE — 1157F ADVNC CARE PLAN IN RCRD: CPT | Performed by: PHYSICIAN ASSISTANT

## 2025-04-02 PROCEDURE — 1159F MED LIST DOCD IN RCRD: CPT | Performed by: PHYSICIAN ASSISTANT

## 2025-04-02 PROCEDURE — 1036F TOBACCO NON-USER: CPT | Performed by: PHYSICIAN ASSISTANT

## 2025-04-02 PROCEDURE — 1123F ACP DISCUSS/DSCN MKR DOCD: CPT | Performed by: PHYSICIAN ASSISTANT

## 2025-04-02 ASSESSMENT — ENCOUNTER SYMPTOMS
SHORTNESS OF BREATH: 0
DIARRHEA: 0
ADENOPATHY: 0
JOINT SWELLING: 0
DIZZINESS: 0
ARTHRALGIAS: 0
WEAKNESS: 0
LIGHT-HEADEDNESS: 0
VOMITING: 0
ABDOMINAL PAIN: 0
NUMBNESS: 0
FEVER: 0
COLOR CHANGE: 0
NAUSEA: 0
FATIGUE: 0
CONSTIPATION: 0
SPEECH DIFFICULTY: 0
SLEEP DISTURBANCE: 0
SEIZURES: 0
CHILLS: 0
DIFFICULTY URINATING: 0
CONFUSION: 0
COUGH: 0
FACIAL ASYMMETRY: 0
TROUBLE SWALLOWING: 0
PALPITATIONS: 0
NECK PAIN: 0
WOUND: 0
SORE THROAT: 0
HEADACHES: 0
WHEEZING: 0

## 2025-04-02 NOTE — PROGRESS NOTES
"Subjective   Patient ID: Robyn Madera is a 80 y.o. female who presents for Follow-up (S/p Venogram).  HPI  80 year old female presents for follow up visit s/p bilateral venogram, IVUS no significant compression noted. History of right iliac vein stent. Persistent LLE swelling. Continues to have LLE swelling, unchanged. No open wounds or ulcerations.   Review of Systems   Constitutional:  Negative for chills, fatigue and fever.   HENT:  Negative for congestion, sore throat and trouble swallowing.    Eyes:  Negative for visual disturbance.   Respiratory:  Negative for cough, shortness of breath and wheezing.    Cardiovascular:  Positive for leg swelling. Negative for chest pain and palpitations.   Gastrointestinal:  Negative for abdominal pain, constipation, diarrhea, nausea and vomiting.   Endocrine: Negative for cold intolerance and heat intolerance.   Genitourinary:  Negative for difficulty urinating.   Musculoskeletal:  Negative for arthralgias, joint swelling and neck pain.   Skin:  Negative for color change and wound.   Neurological:  Negative for dizziness, seizures, syncope, facial asymmetry, speech difficulty, weakness, light-headedness, numbness and headaches.   Hematological:  Negative for adenopathy.   Psychiatric/Behavioral:  Negative for behavioral problems, confusion and sleep disturbance.      Vitals:    04/02/25 1347   BP: 157/79   BP Location: Right arm   Patient Position: Sitting   BP Cuff Size: Adult   Pulse: 100   Weight: 70.3 kg (155 lb)   Height: 1.676 m (5' 6\")      Objective   Physical Exam  Constitutional:       Appearance: Normal appearance.   HENT:      Head: Normocephalic.      Right Ear: External ear normal.      Left Ear: External ear normal.      Nose: Nose normal.      Mouth/Throat:      Mouth: Mucous membranes are moist.   Eyes:      Extraocular Movements: Extraocular movements intact.   Neck:      Vascular: No carotid bruit.   Cardiovascular:      Rate and Rhythm: Normal rate " and regular rhythm.      Pulses: Normal pulses.   Pulmonary:      Effort: Pulmonary effort is normal. No respiratory distress.      Breath sounds: Normal breath sounds.   Abdominal:      Palpations: Abdomen is soft.      Tenderness: There is no abdominal tenderness.   Musculoskeletal:         General: No swelling or tenderness.      Cervical back: Normal range of motion and neck supple.      Right lower leg: Edema present.      Left lower leg: Edema present.   Skin:     General: Skin is warm and dry.      Capillary Refill: Capillary refill takes less than 2 seconds.      Findings: No lesion.   Neurological:      General: No focal deficit present.      Mental Status: She is alert and oriented to person, place, and time. Mental status is at baseline.   Psychiatric:         Mood and Affect: Mood normal.         Behavior: Behavior normal.         Thought Content: Thought content normal.         Judgment: Judgment normal.         Assessment/Plan   Problem List Items Addressed This Visit    None  Visit Diagnoses         Codes    Lymphedema    -  Primary I89.0    Relevant Orders    Referral to Occupational Therapy    May-Thurner syndrome     I87.1    Relevant Orders    Referral to Occupational Therapy    Venous insufficiency     I87.2    Relevant Orders    Referral to Occupational Therapy        80 year old female presents for follow up visit s/p bilateral venogram, IVUS no significant compression noted. History of right iliac vein stent. Persistent LLE swelling. Continues to have LLE swelling, unchanged. No open wounds or ulcerations.   -refer to lymphedema clinic  -continue compression, elevation and ambulation as tolerated  -clinical follow up in 6 months          Leonor Dawkins PA-C 04/02/25 1:53 PM

## 2025-04-08 DIAGNOSIS — N32.81 OAB (OVERACTIVE BLADDER): Primary | ICD-10-CM

## 2025-04-08 RX ORDER — MIRABEGRON 50 MG/1
50 TABLET, FILM COATED, EXTENDED RELEASE ORAL DAILY
Qty: 90 TABLET | Refills: 11 | Status: SHIPPED | OUTPATIENT
Start: 2025-04-08

## 2025-04-17 DIAGNOSIS — M48.061 SPINAL STENOSIS OF LUMBAR REGION, UNSPECIFIED WHETHER NEUROGENIC CLAUDICATION PRESENT: ICD-10-CM

## 2025-04-17 DIAGNOSIS — M47.816 LUMBAR SPONDYLOSIS: ICD-10-CM

## 2025-04-17 DIAGNOSIS — M54.16 LUMBAR RADICULOPATHY: ICD-10-CM

## 2025-04-17 NOTE — TELEPHONE ENCOUNTER
Pt is requesting refill of   oxycodone 5 mg 1 tablet po BID Awa Waddell                                                        LV:   3/10/25                 NV:  6/4/25                OARRS reviewed with L[facility]:   3/18/25 #60/30 days                         Pended RX to MIKHAIL Hermosillo for transmission to pharmacy.

## 2025-04-18 RX ORDER — OXYCODONE HYDROCHLORIDE 5 MG/1
5 TABLET ORAL 2 TIMES DAILY PRN
Qty: 60 TABLET | Refills: 0 | Status: SHIPPED | OUTPATIENT
Start: 2025-04-18 | End: 2025-05-18

## 2025-04-22 ENCOUNTER — EVALUATION (OUTPATIENT)
Dept: OCCUPATIONAL THERAPY | Facility: HOSPITAL | Age: 80
End: 2025-04-22
Payer: MEDICARE

## 2025-04-22 DIAGNOSIS — I87.1 MAY-THURNER SYNDROME: ICD-10-CM

## 2025-04-22 DIAGNOSIS — I87.2 VENOUS INSUFFICIENCY: ICD-10-CM

## 2025-04-22 DIAGNOSIS — I89.0 LYMPHEDEMA: ICD-10-CM

## 2025-04-22 PROCEDURE — 97535 SELF CARE MNGMENT TRAINING: CPT | Mod: GO | Performed by: OCCUPATIONAL THERAPIST

## 2025-04-22 PROCEDURE — 97165 OT EVAL LOW COMPLEX 30 MIN: CPT | Mod: GO | Performed by: OCCUPATIONAL THERAPIST

## 2025-04-22 ASSESSMENT — ENCOUNTER SYMPTOMS
OCCASIONAL FEELINGS OF UNSTEADINESS: 1
LOSS OF SENSATION IN FEET: 0
DEPRESSION: 0

## 2025-04-22 ASSESSMENT — ACTIVITIES OF DAILY LIVING (ADL): HOME_MANAGEMENT_TIME_ENTRY: 20

## 2025-04-22 NOTE — PROGRESS NOTES
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   Recommend inelastic wraps for reduction.  Future recommendation for compression knee highs for long-term home management, 20-30 mmHg. Client demonstrated good understanding of AE usage to help aid in donning/doffing TG trips better.         Pain after session: no change in pain status       Plan: OT treament may include ADLs, therapeutic exercises and activites, aquatic therapy, edema control, lymphedema management, instruction on garments and a pump and HEP.       Frequency: 1x per week  Duration: 12 weeks  Pt was provided with education on the lymphatic system, lymphedema and what treatment will include.  Education today touched on compression, exercises, skin care and MLD.  Pt is to elevate as able and increase activity, especially walking.  Compression was initiated.  Pt was fitted with double size E tg .  Pt was instructed on use, care, application and precautions of tg .   She had difficulty managing the E so it was removed.     Subjective   Current Problem:  1. Lymphedema  Referral to Occupational Therapy    Follow Up In Occupational Therapy      2. May-Thurner syndrome  Referral to Occupational Therapy      3. Venous insufficiency  Referral to Occupational Therapy        General:      Client realized she had discoloration/swelling within her legs that looked similar to her friends and went to her doctor to discuss issues with legs. Client reported she wants to get left hip replacement and  wants to ensure the swelling goes down in order to avoid complications with surgery. Worse swelling can be noted in the L compared to the R leg as stated by the client. Client wears TG  at home to help with swelling on both legs from calf down. Bilateral knee replacements and hip replacement along with stent in R groin.     General  Reason for Referral: lymphedema  Referred By: Leonor Dawkins  Precautions: standard lymphedema precautions   STEADI Fall Risk Score (The score of 4 or more indicates an increased risk of falling): 9    Pain: L ankle pain and has contractures in L toes.     Objective   Prior Function: Lives alone and normally drives herself.  Has a rollator/cane for mobility and AE for putting on shoes/socks.  Sleeps in recliner to elevate her legs more easily and manage acid reflux     ADL: Client reports she is independent with all ADLs/IADLs at home with use of differing AE for help.      Therapy/Activity: Client educated over general lymphatic system, wear/care of compression garments, pneumonic pump education about scheduling a trial run for wear/care/cost information. Additionally, client was educated over sock aid usage and compression garment AE to aid in donning/doffing compression garments. Client was given verbal/visual demonstration of differing suggestions with AE to help with ease of wearing.      Lymphedema Assessment     Right Lower Extremity:  R Metatarsal (cm): 22 cm  R Heel Y Angle: 30.5 cm  R Ankle (cm): 26 cm  R 10 cm Above Ankle (cm): 23.5 cm  R 20 cm Above Ankle (cm): 30 cm  R 30 cm Above Ankle (cm): 37 cm  R 40 cm Above Ankle (cm): 0 cm  R Knee (cm): 0 cm  R 10 cm Above Knee (cm): 0 cm  R 20 cm Above Knee (cm): 0 cm  R 30 cm Above Knee (cm): 0 cm  R 40 cm Above Knee (cm): 0 cm  R 50 cm Above Knee (cm): 0 cm  Right lower extremity total: 169  Left Lower Extremity:  L Metatarsal (cm): 21.5 cm  L Heel Y Angle: 31.8 cm  L Ankle (cm): 27.8 cm  L 10 cm Above Ankle (cm): 24.5 cm  L 20  cm Above Ankle (cm): 32 cm  L 30 cm Above Ankle (cm): 34.5 cm  L 40 cm Above Ankle (cm): 0 cm  L Knee (cm): 0 cm  L 10 cm Above Knee (cm): 0 cm  L 20 cm Above Knee (cm): 0 cm  L 30 cm Above Knee (cm): 0 cm  L 40 cm Above Knee (cm): 0 cm  L 50 cm Above Knee (cm): 0 cm  Left Lower extremity total: 172.1      LE Skin Appearance/Condition and Girth:  Edema - Pitting: yes     Pain Assessment: minimal pain at L ankle.      Outcome Measures: LLIS score of 41.18    OP EDUCATION:  Education  Individual(s) Educated: Patient  Education Provided: Anatomy & Physiology, Diagnosis & Precautions, Edema control, Lymphedema, Symptom management, Risk and benefits of OT discussed with patient or other  Home Program: Handout issued, Edema control    Goals:  Active       OT Goals       LTG: Pt will show improvement on the LLIS impairment scale to no greater than 20% impaired.         Start:  04/22/25    Expected End:  07/15/25            STG: Decrease girth in bilateral LEs by 10 cm each for improved mobility and better clothing/shoe fit.         Start:  04/22/25    Expected End:  07/15/25            STG: Pt will be independent with self management, including use of garments, pump if indicated, self MLD, ability to recognize signs of infection/cellulitis and exercise/HEP to minimize risk of progression of symptoms and skin infections/cellulitis.        Start:  04/22/25    Expected End:  07/15/25            Client stated goal to manage lymphedema swelling before having hip surgery to avoid complications and have a better recovery overall       Start:  04/22/25    Expected End:  07/15/25

## 2025-04-28 ENCOUNTER — ANCILLARY PROCEDURE (OUTPATIENT)
Dept: URGENT CARE | Age: 80
End: 2025-04-28
Payer: MEDICARE

## 2025-04-28 ENCOUNTER — OFFICE VISIT (OUTPATIENT)
Dept: URGENT CARE | Age: 80
End: 2025-04-28
Payer: MEDICARE

## 2025-04-28 VITALS
RESPIRATION RATE: 17 BRPM | SYSTOLIC BLOOD PRESSURE: 135 MMHG | TEMPERATURE: 97.7 F | DIASTOLIC BLOOD PRESSURE: 77 MMHG | OXYGEN SATURATION: 95 % | HEART RATE: 100 BPM

## 2025-04-28 DIAGNOSIS — J45.901 EXACERBATION OF ASTHMA, UNSPECIFIED ASTHMA SEVERITY, UNSPECIFIED WHETHER PERSISTENT (HHS-HCC): Primary | ICD-10-CM

## 2025-04-28 DIAGNOSIS — B37.0 CANDIDAL STOMATITIS: ICD-10-CM

## 2025-04-28 DIAGNOSIS — Z20.822 SUSPECTED COVID-19 VIRUS INFECTION: ICD-10-CM

## 2025-04-28 DIAGNOSIS — R05.1 ACUTE COUGH: ICD-10-CM

## 2025-04-28 LAB
POC CORONAVIRUS SARS-COV-2 PCR: NEGATIVE
POC HUMAN RHINOVIRUS PCR: NEGATIVE
POC INFLUENZA A VIRUS PCR: NEGATIVE
POC INFLUENZA B VIRUS PCR: NEGATIVE
POC RESPIRATORY SYNCYTIAL VIRUS PCR: NEGATIVE

## 2025-04-28 PROCEDURE — 99203 OFFICE O/P NEW LOW 30 MIN: CPT | Performed by: NURSE PRACTITIONER

## 2025-04-28 PROCEDURE — 1159F MED LIST DOCD IN RCRD: CPT | Performed by: NURSE PRACTITIONER

## 2025-04-28 PROCEDURE — 3078F DIAST BP <80 MM HG: CPT | Performed by: NURSE PRACTITIONER

## 2025-04-28 PROCEDURE — 87631 RESP VIRUS 3-5 TARGETS: CPT | Performed by: NURSE PRACTITIONER

## 2025-04-28 PROCEDURE — 3075F SYST BP GE 130 - 139MM HG: CPT | Performed by: NURSE PRACTITIONER

## 2025-04-28 PROCEDURE — 71046 X-RAY EXAM CHEST 2 VIEWS: CPT | Performed by: NURSE PRACTITIONER

## 2025-04-28 PROCEDURE — 1123F ACP DISCUSS/DSCN MKR DOCD: CPT | Performed by: NURSE PRACTITIONER

## 2025-04-28 PROCEDURE — 1157F ADVNC CARE PLAN IN RCRD: CPT | Performed by: NURSE PRACTITIONER

## 2025-04-28 RX ORDER — NYSTATIN 100000 [USP'U]/ML
SUSPENSION ORAL
Qty: 200 ML | Refills: 0 | Status: SHIPPED | OUTPATIENT
Start: 2025-04-28

## 2025-04-28 RX ORDER — DOXYCYCLINE HYCLATE 100 MG
100 TABLET ORAL 2 TIMES DAILY
Qty: 20 TABLET | Refills: 0 | Status: SHIPPED | OUTPATIENT
Start: 2025-04-28 | End: 2025-05-08

## 2025-04-28 ASSESSMENT — ENCOUNTER SYMPTOMS
SORE THROAT: 0
MYALGIAS: 0
COUGH: 1
FEVER: 0
SWEATS: 0
WHEEZING: 1
WEIGHT LOSS: 0
SHORTNESS OF BREATH: 0
HEADACHES: 0
RHINORRHEA: 0
HEARTBURN: 0
HEMOPTYSIS: 0
CHILLS: 0

## 2025-04-28 NOTE — PROGRESS NOTES
Subjective   Patient ID: Robyn Madera is a 80 y.o. female. They present today with a chief complaint of Cough (Presents with congestion and cough for 5 days. ).    History of Present Illness    History provided by:  Patient   used: No    Cough  This is a new problem. The current episode started in the past 7 days. The problem has been gradually worsening. The problem occurs hourly. The cough is Productive of purulent sputum. Associated symptoms include wheezing. Pertinent negatives include no chest pain, chills, ear congestion, ear pain, fever, headaches, heartburn, hemoptysis, myalgias, nasal congestion, postnasal drip, rash, rhinorrhea, sore throat, shortness of breath, sweats or weight loss. Nothing aggravates the symptoms. She has tried OTC cough suppressant (Delsym, cough drops, tea) for the symptoms. The treatment provided no relief. Her past medical history is significant for asthma. lung nodules. PCP following every 3-6 months       Past Medical History  Allergies as of 04/28/2025 - Reviewed 04/28/2025   Allergen Reaction Noted    House dust Hives 01/12/2017    Adhesive tape-silicones Other 07/07/2020    Cat dander Unknown 08/21/2023    Penicillins Hives 03/10/2023       Prescriptions Prior to Admission[1]     Medical History[2]    Surgical History[3]     reports that she has never smoked. She has never used smokeless tobacco. She reports that she does not currently use alcohol. She reports that she does not use drugs.    Review of Systems  Review of Systems   Constitutional:  Negative for chills, fever and weight loss.   HENT:  Negative for ear pain, postnasal drip, rhinorrhea and sore throat.    Respiratory:  Positive for cough and wheezing. Negative for hemoptysis and shortness of breath.    Cardiovascular:  Negative for chest pain.   Gastrointestinal:  Negative for heartburn.   Musculoskeletal:  Negative for myalgias.   Skin:  Negative for rash.   Neurological:  Negative for  headaches.            Objective    Vitals:    04/28/25 1105   BP: 135/77   Pulse: 100   Resp: 17   Temp: 36.5 °C (97.7 °F)   TempSrc: Oral   SpO2: 95%     No LMP recorded. Patient is postmenopausal.    Physical Exam  Vitals and nursing note reviewed.   Constitutional:       Appearance: Normal appearance.   HENT:      Head: Normocephalic and atraumatic.      Right Ear: Hearing, tympanic membrane, ear canal and external ear normal.      Left Ear: Hearing, tympanic membrane, ear canal and external ear normal.      Nose: Mucosal edema, congestion and rhinorrhea present. No nasal deformity, septal deviation, signs of injury, laceration or nasal tenderness. Rhinorrhea is purulent.      Right Sinus: No maxillary sinus tenderness or frontal sinus tenderness.      Left Sinus: No maxillary sinus tenderness or frontal sinus tenderness.      Mouth/Throat:      Lips: Pink.      Mouth: Mucous membranes are moist.      Pharynx: Oropharynx is clear. Uvula midline.      Tonsils: No tonsillar exudate or tonsillar abscesses.   Cardiovascular:      Rate and Rhythm: Normal rate and regular rhythm.      Heart sounds: Normal heart sounds.   Pulmonary:      Effort: Pulmonary effort is normal.      Breath sounds: Normal air entry. Examination of the left-middle field reveals rhonchi. Examination of the left-lower field reveals rhonchi. Rhonchi present.   Musculoskeletal:      Cervical back: Normal range of motion and neck supple.   Lymphadenopathy:      Cervical: No cervical adenopathy.   Neurological:      Mental Status: She is alert.   Psychiatric:         Mood and Affect: Mood normal.         Behavior: Behavior normal.       Procedures    Point of Care Test & Imaging Results from this visit  Results for orders placed or performed in visit on 04/28/25   POCT SPOTFIRE R/ST Panel Mini w/COVID ([facility]) manually resulted    Specimen: Swab   Result Value Ref Range    POC Sars-Cov-2 PCR Negative Negative    POC Respiratory Syncytial Virus  PCR Negative Negative    POC Influenza A Virus PCR Negative Negative    POC Influenza B Virus PCR Negative Negative    POC Human Rhinovirus PCR Negative Negative      Imaging  XR chest 2 views  Result Date: 4/28/2025  1.  No evidence of acute cardiopulmonary process. Emphysema/COPD.       MACRO: None   Signed by: Joseph Schoenberger 4/28/2025 11:49 AM Dictation workstation:   [id number]      Cardiology, Vascular, and Other Imaging  No other imaging results found for the past 2 days      Diagnostic study results (if any) were reviewed by BERNARDO Bansal.    Assessment/Plan   Allergies, medications, history, and pertinent labs/EKGs/Imaging reviewed by BERNARDO Bansal.     Medical Decision Making  Negative spotfire and chest xray.  Take the antibiotic with food.  Eat yogurt or take probiotic once a day.  Symptoms should improve in 2 to 3 days.   Advised to use the inhaler she has at home.   Wash your hands often, especially after coughing or touching your nose or mouth.  Use disposable tissues instead of handkerchiefs.  Increase fluid intake and rest as needed.  Take Tylenol and/or ibuprofen as needed for aches and pain and/or fever.  Return or follow-up with primary care provider if symptoms did not improve.  Call 911 or go to the nearest emergency room if symptoms became severe such as fever of 102.5 degrees Fahrenheit or 39.2 degrees Celsius, severe pain, shortness of breath, chest tightness.   Patient verbalized understanding of the instructions and left in stable condition.    Orders and Diagnoses  Diagnoses and all orders for this visit:  Exacerbation of asthma, unspecified asthma severity, unspecified whether persistent ([facility])  -     doxycycline (Vibra-Tabs) 100 mg tablet; Take 1 tablet (100 mg) by mouth 2 times a day for 10 days. Take with a full glass of water and do not lie down for at least 30 minutes after.  Acute cough  -     XR chest 2 views  -     POCT SPOTFIRE R/ST Panel Mini w/COVID  ([facility]) manually resulted  Candidal stomatitis  -     nystatin (Mycostatin) 100,000 unit/mL suspension; Swish and swallow 5 mL by mouth 4 times daily for 10 days  Suspected COVID-19 virus infection      Medical Admin Record      Patient disposition: Home    Electronically signed by BERNARDO Bansal  1:03 PM           [1] (Not in a hospital admission)   [2]   Past Medical History:  Diagnosis Date    Abnormal findings on diagnostic imaging of heart and coronary circulation 11/07/2019    Abnormal Heart Score CT    Abnormal levels of other serum enzymes 10/24/2019    Abnormal cardiac enzyme level    Allergic rhinitis due to animal hair or dander 10/28/2013    Anxiety 2023    Arthritis 2016    Chronic constipation 2022    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/2014    Disease of thyroid gland 2018    Diverticulosis of colon 06/17/2013    Diverticulosis of intestine, part unspecified, without perforation or abscess without bleeding     Diverticulosis    Dizziness     positional    Gastric polyp 06/23/2014    GERD (gastroesophageal reflux disease) 2008    Glaucoma suspect, both eyes 01/08/2015    HL (hearing loss) 2022    Hx of squamous cell carcinoma of skin 06/09/2015    Hyperlipidemia     Hypertension 2015    Internal hemorrhoid 03/21/2016    Irregular heart beat     Low TSH level 03/10/2023    Lung nodule, solitary 03/10/2023    Lung nodule, solitary 03/10/2023    Stable CT 6/23  No follow up needed    Mild intermittent asthma without complication ([facility]) 02/24/2016    Other microscopic hematuria 03/01/2018    Microscopic hematuria    Other specified abnormal findings of blood chemistry 10/19/2021    Elevated LFTs    Other specified abnormal findings of blood chemistry 10/19/2021    Elevated LFTs    Pain in right knee     Right knee pain    Personal history of other diseases of the digestive system     History of hiatal hernia    Personal history of other diseases of the musculoskeletal system  and connective tissue 01/17/2020    History of osteopenia    Personal history of other diseases of the musculoskeletal system and connective tissue     History of herniated intervertebral disc    Personal history of other diseases of the musculoskeletal system and connective tissue     History of chronic back pain    Personal history of other diseases of the nervous system and sense organs     History of cataract    Personal history of other diseases of the nervous system and sense organs     History of glaucoma    Personal history of other diseases of the respiratory system     History of allergic rhinitis    Personal history of other endocrine, nutritional and metabolic disease     History of hyperlipidemia    Personal history of other endocrine, nutritional and metabolic disease     History of Graves' disease    Personal history of other endocrine, nutritional and metabolic disease     History of hypoglycemia    Personal history of other malignant neoplasm of skin     History of malignant neoplasm of skin    Personal history of other mental and behavioral disorders     History of anxiety    Presence of right artificial knee joint 09/21/2017    Scoliosis 2008    Spinal stenosis     Vision loss     Visual impairment 2008    Wears partial dentures     upper    Wrist sprain 03/10/2023   [3]   Past Surgical History:  Procedure Laterality Date    HIP ARTHROPLASTY      right    JOINT REPLACEMENT Bilateral 2017-18    knees    KNEE ARTHROSCOPY W/ DEBRIDEMENT  07/09/2016    Arthroscopy Knee    OTHER SURGICAL HISTORY  10/14/2019    Excision of squamous cell carcinoma

## 2025-04-30 ENCOUNTER — TREATMENT (OUTPATIENT)
Dept: OCCUPATIONAL THERAPY | Facility: HOSPITAL | Age: 80
End: 2025-04-30
Payer: MEDICARE

## 2025-04-30 DIAGNOSIS — E05.90 SUBCLINICAL HYPERTHYROIDISM: Primary | ICD-10-CM

## 2025-04-30 DIAGNOSIS — I89.0 LYMPHEDEMA: ICD-10-CM

## 2025-04-30 LAB
ALBUMIN SERPL-MCNC: 4.2 G/DL (ref 3.6–5.1)
ALP SERPL-CCNC: 96 U/L (ref 37–153)
ALT SERPL-CCNC: 18 U/L (ref 6–29)
ANION GAP SERPL CALCULATED.4IONS-SCNC: 13 MMOL/L (CALC) (ref 7–17)
AST SERPL-CCNC: 23 U/L (ref 10–35)
BILIRUB SERPL-MCNC: 0.7 MG/DL (ref 0.2–1.2)
BUN SERPL-MCNC: 16 MG/DL (ref 7–25)
CALCIUM SERPL-MCNC: 8.7 MG/DL (ref 8.6–10.4)
CHLORIDE SERPL-SCNC: 101 MMOL/L (ref 98–110)
CO2 SERPL-SCNC: 25 MMOL/L (ref 20–32)
CREAT SERPL-MCNC: 0.69 MG/DL (ref 0.6–1)
EGFRCR SERPLBLD CKD-EPI 2021: 89 ML/MIN/1.73M2
ERYTHROCYTE [DISTWIDTH] IN BLOOD BY AUTOMATED COUNT: 12.4 % (ref 11–15)
GLUCOSE SERPL-MCNC: 175 MG/DL (ref 65–99)
HCT VFR BLD AUTO: 39.1 % (ref 35–45)
HGB BLD-MCNC: 13 G/DL (ref 11.7–15.5)
MCH RBC QN AUTO: 31.2 PG (ref 27–33)
MCHC RBC AUTO-ENTMCNC: 33.2 G/DL (ref 32–36)
MCV RBC AUTO: 93.8 FL (ref 80–100)
PLATELET # BLD AUTO: 265 THOUSAND/UL (ref 140–400)
PMV BLD REES-ECKER: 11.8 FL (ref 7.5–12.5)
POTASSIUM SERPL-SCNC: 3.7 MMOL/L (ref 3.5–5.3)
PROT SERPL-MCNC: 7.4 G/DL (ref 6.1–8.1)
RBC # BLD AUTO: 4.17 MILLION/UL (ref 3.8–5.1)
SODIUM SERPL-SCNC: 139 MMOL/L (ref 135–146)
T3FREE SERPL-MCNC: 3.4 PG/ML (ref 2.3–4.2)
T4 FREE SERPL-MCNC: 1.3 NG/DL (ref 0.8–1.8)
TSH SERPL-ACNC: 0.02 MIU/L (ref 0.4–4.5)
WBC # BLD AUTO: 5.2 THOUSAND/UL (ref 3.8–10.8)

## 2025-04-30 PROCEDURE — 97140 MANUAL THERAPY 1/> REGIONS: CPT | Mod: GO,CO

## 2025-04-30 PROCEDURE — 97535 SELF CARE MNGMENT TRAINING: CPT | Mod: GO,CO

## 2025-04-30 RX ORDER — METHIMAZOLE 10 MG/1
10 TABLET ORAL DAILY
Qty: 90 TABLET | Refills: 1 | Status: SHIPPED | OUTPATIENT
Start: 2025-04-30 | End: 2026-04-30

## 2025-04-30 ASSESSMENT — PAIN - FUNCTIONAL ASSESSMENT: PAIN_FUNCTIONAL_ASSESSMENT: 0-10

## 2025-04-30 ASSESSMENT — ACTIVITIES OF DAILY LIVING (ADL): HOME_MANAGEMENT_TIME_ENTRY: 15

## 2025-04-30 NOTE — PROGRESS NOTES
Occupational Therapy    Occupational Therapy    Occupational Therapy Treatment    Name: Robyn Madera  MRN: [id number]  : 1945  Date: 2025  Time Calculation  Start Time: 1500  Stop Time: 1600  Time Calculation (min): 60 min     OT Therapeutic Procedures Time Entry  Manual Therapy Time Entry: 45  Self Care/Home Management (ADLs) Time Entry: 15                 Visit number: 2    Assessment:     Pain after treatment 0/10  Plan:  Cont skilled OT to decrease edema and pain via compression, elevation exercises and massage. Improve AROM and balance to improve ease of ADLs, IADLs and mobility       Subjective Pt states that she has gone from 2 layer TG  to 1 layer. She was not able to get the double layer donned.   General:  Pt arrived without her Tg  on, inst Pt that we need to have her wear in the Tg  so that the therapists can see how they are fitting. Pt verbalized understanding.      Name and  confirmed: yes  Objective    Therapy/Activity:   SELF CARE: Pt educated on anatomy and physiology and how it pertains to tx with MLD, compression, exs and elevation for lymphedema control.   MANUAL: MLD, SCF, abdominal deep and superficial, B axilla, B inguinal and BLES. Pt given handout and inst in MLD sequence and type of hand strokes to use.   THERAPEUTIC EXERCISE: Pt educated in diaphragmatic breathing and given a written inst to perform throughout the day.  Lymphedema Assessment       Right Lower Extremity:  R Metatarsal (cm): 21.4 cm  R Heel Y Angle: 30.5 cm  R Ankle (cm): 22 cm  R 10 cm Above Ankle (cm): 24 cm  R 20 cm Above Ankle (cm): 32 cm  R 30 cm Above Ankle (cm): 38 cm  R 40 cm Above Ankle (cm): 0 cm  R Knee (cm): 0 cm  R 10 cm Above Knee (cm): 0 cm  R 20 cm Above Knee (cm): 0 cm  R 30 cm Above Knee (cm): 0 cm  R 40 cm Above Knee (cm): 0 cm  R 50 cm Above Knee (cm): 0 cm  Right lower extremity total: 167.9  Left Lower Extremity:  L Metatarsal (cm): 20.9 cm  L Heel Y Angle: 31.4 cm  L  Ankle (cm): 24 cm  L 10 cm Above Ankle (cm): 25 cm  L 20 cm Above Ankle (cm): 34 cm  L 30 cm Above Ankle (cm): 36 cm  L 40 cm Above Ankle (cm): 0 cm  L Knee (cm): 0 cm  L 10 cm Above Knee (cm): 0 cm  L 20 cm Above Knee (cm): 0 cm  L 30 cm Above Knee (cm): 0 cm  L 40 cm Above Knee (cm): 0 cm  L 50 cm Above Knee (cm): 0 cm  Left Lower extremity total: 171.3  LE Skin Appearance/Condition and Girth:  Edema - Pitting: yes  Hyperpigmentation: Yes    Pain Assessment:  Pain Assessment: 0-10  Therapy Precautions:   Medical Precautions: Lymphedema precautions        OP EDUCATION: Pt educated in A and P of the lymphedema system, diaphragmatic breathing and MLD. Pt given handouts.        Goals:  Active       OT Goals       LTG: Pt will show improvement on the LLIS impairment scale to no greater than 20% impaired.         Start:  04/22/25    Expected End:  07/15/25            STG: Decrease girth in bilateral LEs by 10 cm each for improved mobility and better clothing/shoe fit.         Start:  04/22/25    Expected End:  07/15/25            STG: Pt will be independent with self management, including use of garments, pump if indicated, self MLD, ability to recognize signs of infection/cellulitis and exercise/HEP to minimize risk of progression of symptoms and skin infections/cellulitis.        Start:  04/22/25    Expected End:  07/15/25            Client stated goal to manage lymphedema swelling before having hip surgery to avoid complications and have a better recovery overall       Start:  04/22/25    Expected End:  07/15/25

## 2025-05-07 ENCOUNTER — APPOINTMENT (OUTPATIENT)
Dept: OCCUPATIONAL THERAPY | Facility: HOSPITAL | Age: 80
End: 2025-05-07
Payer: MEDICARE

## 2025-05-07 ENCOUNTER — HOSPITAL ENCOUNTER (EMERGENCY)
Facility: HOSPITAL | Age: 80
Discharge: HOME | End: 2025-05-07
Payer: MEDICARE

## 2025-05-07 ENCOUNTER — APPOINTMENT (OUTPATIENT)
Dept: RADIOLOGY | Facility: HOSPITAL | Age: 80
End: 2025-05-07
Payer: MEDICARE

## 2025-05-07 VITALS
WEIGHT: 157 LBS | TEMPERATURE: 97.9 F | OXYGEN SATURATION: 99 % | HEART RATE: 94 BPM | DIASTOLIC BLOOD PRESSURE: 87 MMHG | BODY MASS INDEX: 25.23 KG/M2 | RESPIRATION RATE: 20 BRPM | HEIGHT: 66 IN | SYSTOLIC BLOOD PRESSURE: 136 MMHG

## 2025-05-07 DIAGNOSIS — M25.552 PAIN OF LEFT HIP: Primary | ICD-10-CM

## 2025-05-07 DIAGNOSIS — R10.9 ABDOMINAL PAIN, UNSPECIFIED ABDOMINAL LOCATION: ICD-10-CM

## 2025-05-07 LAB
ALBUMIN SERPL BCP-MCNC: 4 G/DL (ref 3.4–5)
ALP SERPL-CCNC: 98 U/L (ref 33–136)
ALT SERPL W P-5'-P-CCNC: 16 U/L (ref 7–45)
ANION GAP SERPL CALC-SCNC: 11 MMOL/L (ref 10–20)
APPEARANCE UR: CLEAR
AST SERPL W P-5'-P-CCNC: 20 U/L (ref 9–39)
BASOPHILS # BLD AUTO: 0.02 X10*3/UL (ref 0–0.1)
BASOPHILS NFR BLD AUTO: 0.3 %
BILIRUB SERPL-MCNC: 1.2 MG/DL (ref 0–1.2)
BILIRUB UR STRIP.AUTO-MCNC: NEGATIVE MG/DL
BUN SERPL-MCNC: 18 MG/DL (ref 6–23)
CALCIUM SERPL-MCNC: 9.2 MG/DL (ref 8.6–10.3)
CHLORIDE SERPL-SCNC: 104 MMOL/L (ref 98–107)
CO2 SERPL-SCNC: 25 MMOL/L (ref 21–32)
COLOR UR: COLORLESS
CREAT SERPL-MCNC: 0.71 MG/DL (ref 0.5–1.05)
EGFRCR SERPLBLD CKD-EPI 2021: 87 ML/MIN/1.73M*2
EOSINOPHIL # BLD AUTO: 0.05 X10*3/UL (ref 0–0.4)
EOSINOPHIL NFR BLD AUTO: 0.7 %
ERYTHROCYTE [DISTWIDTH] IN BLOOD BY AUTOMATED COUNT: 13.7 % (ref 11.5–14.5)
GLUCOSE SERPL-MCNC: 128 MG/DL (ref 74–99)
GLUCOSE UR STRIP.AUTO-MCNC: NORMAL MG/DL
HCT VFR BLD AUTO: 37.5 % (ref 36–46)
HGB BLD-MCNC: 12.4 G/DL (ref 12–16)
HOLD SPECIMEN: 293
IMM GRANULOCYTES # BLD AUTO: 0.02 X10*3/UL (ref 0–0.5)
IMM GRANULOCYTES NFR BLD AUTO: 0.3 % (ref 0–0.9)
KETONES UR STRIP.AUTO-MCNC: NEGATIVE MG/DL
LEUKOCYTE ESTERASE UR QL STRIP.AUTO: NEGATIVE
LYMPHOCYTES # BLD AUTO: 1.11 X10*3/UL (ref 0.8–3)
LYMPHOCYTES NFR BLD AUTO: 15.4 %
MCH RBC QN AUTO: 30.5 PG (ref 26–34)
MCHC RBC AUTO-ENTMCNC: 33.1 G/DL (ref 32–36)
MCV RBC AUTO: 92 FL (ref 80–100)
MONOCYTES # BLD AUTO: 0.99 X10*3/UL (ref 0.05–0.8)
MONOCYTES NFR BLD AUTO: 13.8 %
NEUTROPHILS # BLD AUTO: 5.01 X10*3/UL (ref 1.6–5.5)
NEUTROPHILS NFR BLD AUTO: 69.5 %
NITRITE UR QL STRIP.AUTO: NEGATIVE
NRBC BLD-RTO: 0 /100 WBCS (ref 0–0)
PH UR STRIP.AUTO: 7.5 [PH]
PLATELET # BLD AUTO: 286 X10*3/UL (ref 150–450)
POTASSIUM SERPL-SCNC: 3.7 MMOL/L (ref 3.5–5.3)
PROT SERPL-MCNC: 7.8 G/DL (ref 6.4–8.2)
PROT UR STRIP.AUTO-MCNC: NEGATIVE MG/DL
RBC # BLD AUTO: 4.07 X10*6/UL (ref 4–5.2)
RBC # UR STRIP.AUTO: NEGATIVE MG/DL
SODIUM SERPL-SCNC: 136 MMOL/L (ref 136–145)
SP GR UR STRIP.AUTO: 1.01
UROBILINOGEN UR STRIP.AUTO-MCNC: NORMAL MG/DL
WBC # BLD AUTO: 7.2 X10*3/UL (ref 4.4–11.3)

## 2025-05-07 PROCEDURE — 2500000004 HC RX 250 GENERAL PHARMACY W/ HCPCS (ALT 636 FOR OP/ED): Mod: JW | Performed by: NURSE PRACTITIONER

## 2025-05-07 PROCEDURE — 74177 CT ABD & PELVIS W/CONTRAST: CPT | Mod: FOREIGN READ | Performed by: RADIOLOGY

## 2025-05-07 PROCEDURE — 36415 COLL VENOUS BLD VENIPUNCTURE: CPT | Performed by: NURSE PRACTITIONER

## 2025-05-07 PROCEDURE — 84075 ASSAY ALKALINE PHOSPHATASE: CPT | Performed by: NURSE PRACTITIONER

## 2025-05-07 PROCEDURE — 96374 THER/PROPH/DIAG INJ IV PUSH: CPT | Mod: 59

## 2025-05-07 PROCEDURE — 99285 EMERGENCY DEPT VISIT HI MDM: CPT | Mod: 25

## 2025-05-07 PROCEDURE — 81003 URINALYSIS AUTO W/O SCOPE: CPT | Performed by: NURSE PRACTITIONER

## 2025-05-07 PROCEDURE — 74177 CT ABD & PELVIS W/CONTRAST: CPT

## 2025-05-07 PROCEDURE — 2550000001 HC RX 255 CONTRASTS: Mod: JZ | Performed by: NURSE PRACTITIONER

## 2025-05-07 PROCEDURE — 85025 COMPLETE CBC W/AUTO DIFF WBC: CPT | Performed by: NURSE PRACTITIONER

## 2025-05-07 RX ORDER — KETOROLAC TROMETHAMINE 30 MG/ML
15 INJECTION, SOLUTION INTRAMUSCULAR; INTRAVENOUS ONCE
Status: COMPLETED | OUTPATIENT
Start: 2025-05-07 | End: 2025-05-07

## 2025-05-07 RX ADMIN — KETOROLAC TROMETHAMINE 15 MG: 30 INJECTION, SOLUTION INTRAMUSCULAR at 11:41

## 2025-05-07 RX ADMIN — IOHEXOL 75 ML: 350 INJECTION, SOLUTION INTRAVENOUS at 10:49

## 2025-05-07 ASSESSMENT — PAIN - FUNCTIONAL ASSESSMENT
PAIN_FUNCTIONAL_ASSESSMENT: 0-10

## 2025-05-07 ASSESSMENT — LIFESTYLE VARIABLES
HAVE YOU EVER FELT YOU SHOULD CUT DOWN ON YOUR DRINKING: NO
TOTAL SCORE: 0
HAVE PEOPLE ANNOYED YOU BY CRITICIZING YOUR DRINKING: NO
EVER FELT BAD OR GUILTY ABOUT YOUR DRINKING: NO
EVER HAD A DRINK FIRST THING IN THE MORNING TO STEADY YOUR NERVES TO GET RID OF A HANGOVER: NO

## 2025-05-07 ASSESSMENT — PAIN SCALES - GENERAL
PAINLEVEL_OUTOF10: 9
PAINLEVEL_OUTOF10: 7
PAINLEVEL_OUTOF10: 0 - NO PAIN

## 2025-05-07 NOTE — ED PROVIDER NOTES
Chief Complaint   Patient presents with   • chronic left hip pain, no known injury       HPI       80 year old female presents to the Emergency Department today complaining of left sided abdominal pain that she describes as a soreness, constant since last evening, radiates to her left hip, and varies in intensity. Denies any associated fever, chills, headache, neck pain, chest pain, shortness of breath, abdominal pain, nausea, vomiting, diarrhea, constipation, or urinary symptoms. Has chronic left lower extremity edema and left hip pain.      History provided by:  Patient             Patient History   Medical History[1]  Surgical History[2]  Family History[3]  Social History[4]        Physical Exam  Constitutional:       Appearance: Normal appearance.   HENT:      Head: Normocephalic.      Right Ear: Tympanic membrane, ear canal and external ear normal.      Left Ear: Tympanic membrane, ear canal and external ear normal.      Nose: Nose normal.      Mouth/Throat:      Mouth: Mucous membranes are moist.      Pharynx: Oropharynx is clear. No oropharyngeal exudate or posterior oropharyngeal erythema.   Eyes:      Conjunctiva/sclera: Conjunctivae normal.      Pupils: Pupils are equal, round, and reactive to light.   Cardiovascular:      Rate and Rhythm: Normal rate and regular rhythm.      Pulses:           Radial pulses are 3+ on the right side and 3+ on the left side.        Dorsalis pedis pulses are 3+ on the right side and 3+ on the left side.      Heart sounds: Normal heart sounds. No murmur heard.     No friction rub. No gallop.   Pulmonary:      Effort: Pulmonary effort is normal. No respiratory distress.      Breath sounds: Normal breath sounds. No wheezing, rhonchi or rales.   Abdominal:      General: Abdomen is flat. Bowel sounds are normal.      Palpations: Abdomen is soft.      Tenderness: There is abdominal tenderness in the left upper quadrant and left lower quadrant. There is no right CVA tenderness, left  CVA tenderness, guarding or rebound. Negative signs include Macias's sign and McBurney's sign.   Musculoskeletal:         General: No swelling or deformity.      Cervical back: Full passive range of motion without pain.      Right lower leg: No edema.      Left lower leg: No edema.   Lymphadenopathy:      Cervical: No cervical adenopathy.   Skin:     Capillary Refill: Capillary refill takes less than 2 seconds.      Coloration: Skin is not jaundiced.      Findings: No rash.   Neurological:      General: No focal deficit present.      Mental Status: She is alert and oriented to person, place, and time. Mental status is at baseline.      Gait: Gait is intact.   Psychiatric:         Mood and Affect: Mood normal.         Behavior: Behavior is cooperative.         Labs Reviewed   CBC WITH AUTO DIFFERENTIAL - Abnormal       Result Value    WBC 7.2      nRBC 0.0      RBC 4.07      Hemoglobin 12.4      Hematocrit 37.5      MCV 92      MCH 30.5      MCHC 33.1      RDW 13.7      Platelets 286      Neutrophils % 69.5      Immature Granulocytes %, Automated 0.3      Lymphocytes % 15.4      Monocytes % 13.8      Eosinophils % 0.7      Basophils % 0.3      Neutrophils Absolute 5.01      Immature Granulocytes Absolute, Automated 0.02      Lymphocytes Absolute 1.11      Monocytes Absolute 0.99 (*)     Eosinophils Absolute 0.05      Basophils Absolute 0.02     COMPREHENSIVE METABOLIC PANEL - Abnormal    Glucose 128 (*)     Sodium 136      Potassium 3.7      Chloride 104      Bicarbonate 25      Anion Gap 11      Urea Nitrogen 18      Creatinine 0.71      eGFR 87      Calcium 9.2      Albumin 4.0      Alkaline Phosphatase 98      Total Protein 7.8      AST 20      Bilirubin, Total 1.2      ALT 16     URINALYSIS WITH REFLEX CULTURE AND MICROSCOPIC - Abnormal    Color, Urine Colorless (*)     Appearance, Urine Clear      Specific Gravity, Urine 1.014      pH, Urine 7.5      Protein, Urine NEGATIVE      Glucose, Urine Normal      Blood,  Urine NEGATIVE      Ketones, Urine NEGATIVE      Bilirubin, Urine NEGATIVE      Urobilinogen, Urine Normal      Nitrite, Urine NEGATIVE      Leukocyte Esterase, Urine NEGATIVE     URINALYSIS WITH REFLEX CULTURE AND MICROSCOPIC    Narrative:     The following orders were created for panel order Urinalysis with Reflex Culture and Microscopic.  Procedure                               Abnormality         Status                     ---------                               -----------         ------                     Urinalysis with Reflex C...[[id number]]  Abnormal            Final result               Extra Urine Gray Tube[[id number]]                            In process                   Please view results for these tests on the individual orders.   EXTRA URINE GRAY TUBE       CT abdomen pelvis w IV contrast   Final Result   Large amount of stool throughout the colon.  Constipation cannot be   excluded.   Distended urinary bladder.   Degenerative changes the lumbosacral spine and left hip.   Signed by Jesse Uriostegui MD               ED Course & MDM   Diagnoses as of 05/07/25 1340   Pain of left hip   Abdominal pain, unspecified abdominal location           Medical Decision Making  Patient was seen and evaluated by myself. Saline lock was established with labs drawn and results as above. Given Toradol with improvement in her pain. Blood counts, electrolytes, kidney function, and liver function were unremarkable. Urinalysis shows no signs of infection. CT scan of her abdomen and pelvis with contrast shows large amount of stool throughout the colon; distended urinary bladder; and degenerative changes the lumbosacral spine and left hip. Repeat abdominal evaluation reveals an abdomen soft, nondistended, and nontender to palpation. There was no rebound, rigidity, or guarding noted. There were no peritoneal signs noted. Continued to have multiple benign serial abdominal exams. At this time, we find no underlying evidence of  acute pancreatitis, cholecystitis, cholangitis, diverticulitis, or appendicitis. Take Tylenol and Advil over the counter as needed for fever and/or pain. No contraindications to NSAIDs are noted. Follow up with their doctor in 3 days. Return if worse in any way. Discharged in stable condition with computer instructions.    Diagnostic Impression:     1. Acute nonspecific abdominal pain    2. Acute on chronic left hip pain    3. IV meds in ED               Your medication list        ASK your doctor about these medications        Instructions Last Dose Given Next Dose Due   acetaminophen 500 mg tablet  Commonly known as: Tylenol           amLODIPine 10 mg tablet  Commonly known as: Norvasc      Take 1 tablet (10 mg) by mouth once daily.       aspirin 81 mg EC tablet           atorvastatin 40 mg tablet  Commonly known as: Lipitor      Take 1 tablet (40 mg) by mouth once daily at bedtime.       busPIRone 15 mg tablet  Commonly known as: Buspar      Take 1 tablet (15 mg) by mouth 2 times a day.       calcium carbonate-vitamin D3 500 mg-3.125 mcg (125 unit) tablet tablet           cyanocobalamin 1,000 mcg tablet  Commonly known as: Vitamin B-12           diazePAM 5 mg tablet  Commonly known as: Valium      Take 1 tablet (5 mg) by mouth see administration instructions for 1 day. Patient may take diazepam 5 mg 1 to 2 tablets prior to her MRI       diclofenac sodium 1 % gel  Commonly known as: Voltaren           docusate sodium 100 mg capsule  Commonly known as: Colace           doxycycline 100 mg tablet  Commonly known as: Vibra-Tabs      Take 1 tablet (100 mg) by mouth 2 times a day for 10 days. Take with a full glass of water and do not lie down for at least 30 minutes after.       DULoxetine 30 mg DR capsule  Commonly known as: Cymbalta      Take 1 capsule (30 mg) by mouth once daily. Do not crush or chew.  Patient will take duloxetine 30 mg and 60 mg capsule daily for a total dose of 90 mg daily.       DULoxetine 60 mg  DR capsule  Commonly known as: Cymbalta      Take 1 capsule (60 mg) by mouth once daily. Do not crush or chew.  Will take duloxetine 60 mg and 30 mg capsule daily for a total dose of 90 mg daily.       esomeprazole 20 mg DR capsule  Commonly known as: NexIUM           fesoterodine 4 mg tablet extended release 24 hr  Commonly known as: Toviaz      Take 1 tablet (4 mg) by mouth once daily.       gabapentin 100 mg capsule  Commonly known as: Neurontin      Take 3 capsules (300 mg) by mouth 3 times a day.       lactase 3,000 unit tablet  Commonly known as: Lactaid           lubiprostone 8 mcg capsule  Commonly known as: Amitiza      Take 1 capsule (8 mcg) by mouth 2 times daily (morning and late afternoon). Take with meals       methIMAzole 10 mg tablet  Commonly known as: Tapazole      Take 1 tablet (10 mg) by mouth once daily.       Myrbetriq 50 mg tablet extended release 24 hr 24 hr tablet  Generic drug: mirabegron      TAKE 1 TABLET(50 MG) BY MOUTH DAILY       nystatin 100,000 unit/mL suspension  Commonly known as: Mycostatin      Swish and swallow 5 mL by mouth 4 times daily for 10 days       omeprazole OTC 20 mg EC tablet  Commonly known as: PriLOSEC OTC           oxyCODONE 5 mg immediate release tablet  Commonly known as: Roxicodone      Take 1 tablet (5 mg) by mouth 2 times a day as needed for severe pain (7 - 10).                  Procedure  Procedures           [1]  Past Medical History:  Diagnosis Date   • Abnormal findings on diagnostic imaging of heart and coronary circulation 11/07/2019    Abnormal Heart Score CT   • Abnormal levels of other serum enzymes 10/24/2019    Abnormal cardiac enzyme level   • Allergic rhinitis due to animal hair or dander 10/28/2013   • Anxiety 2023   • Arthritis 2016   • Chronic constipation 2022   • 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/2014   • Disease of thyroid gland 2018   • Diverticulosis of colon 06/17/2013   • Diverticulosis of intestine, part unspecified,  without perforation or abscess without bleeding     Diverticulosis   • Dizziness     positional   • Gastric polyp 06/23/2014   • GERD (gastroesophageal reflux disease) 2008   • Glaucoma suspect, both eyes 01/08/2015   • HL (hearing loss) 2022   • Hx of squamous cell carcinoma of skin 06/09/2015   • Hyperlipidemia    • Hypertension 2015   • Internal hemorrhoid 03/21/2016   • Irregular heart beat    • Low TSH level 03/10/2023   • Lung nodule, solitary 03/10/2023   • Lung nodule, solitary 03/10/2023    Stable CT 6/23  No follow up needed   • Mild intermittent asthma without complication ([facility]HS-HCC) 02/24/2016   • Other microscopic hematuria 03/01/2018    Microscopic hematuria   • Other specified abnormal findings of blood chemistry 10/19/2021    Elevated LFTs   • Other specified abnormal findings of blood chemistry 10/19/2021    Elevated LFTs   • Pain in right knee     Right knee pain   • Personal history of other diseases of the digestive system     History of hiatal hernia   • Personal history of other diseases of the musculoskeletal system and connective tissue 01/17/2020    History of osteopenia   • Personal history of other diseases of the musculoskeletal system and connective tissue     History of herniated intervertebral disc   • Personal history of other diseases of the musculoskeletal system and connective tissue     History of chronic back pain   • Personal history of other diseases of the nervous system and sense organs     History of cataract   • Personal history of other diseases of the nervous system and sense organs     History of glaucoma   • Personal history of other diseases of the respiratory system     History of allergic rhinitis   • Personal history of other endocrine, nutritional and metabolic disease     History of hyperlipidemia   • Personal history of other endocrine, nutritional and metabolic disease     History of Graves' disease   • Personal history of other endocrine, nutritional and  metabolic disease     History of hypoglycemia   • Personal history of other malignant neoplasm of skin     History of malignant neoplasm of skin   • Personal history of other mental and behavioral disorders     History of anxiety   • Presence of right artificial knee joint 09/21/2017   • Scoliosis 2008   • Spinal stenosis    • Vision loss    • Visual impairment 2008   • Wears partial dentures     upper   • Wrist sprain 03/10/2023   [2]  Past Surgical History:  Procedure Laterality Date   • HIP ARTHROPLASTY      right   • JOINT REPLACEMENT Bilateral 2017-18    knees   • KNEE ARTHROSCOPY W/ DEBRIDEMENT  07/09/2016    Arthroscopy Knee   • OTHER SURGICAL HISTORY  10/14/2019    Excision of squamous cell carcinoma   [3]  Family History  Problem Relation Name Age of Onset   • Diabetes Mother Jaci Bingham    [4]  Social History  Tobacco Use   • Smoking status: Never   • Smokeless tobacco: Never   Vaping Use   • Vaping status: Never Used   Substance Use Topics   • Alcohol use: Not Currently   • Drug use: Never      ELENI Huitron-CNP  05/07/25 5881

## 2025-05-14 ENCOUNTER — TREATMENT (OUTPATIENT)
Dept: OCCUPATIONAL THERAPY | Facility: HOSPITAL | Age: 80
End: 2025-05-14
Payer: MEDICARE

## 2025-05-14 DIAGNOSIS — I89.0 LYMPHEDEMA: Primary | ICD-10-CM

## 2025-05-14 PROCEDURE — 97535 SELF CARE MNGMENT TRAINING: CPT | Mod: GO | Performed by: OCCUPATIONAL THERAPIST

## 2025-05-14 PROCEDURE — 97140 MANUAL THERAPY 1/> REGIONS: CPT | Mod: GO | Performed by: OCCUPATIONAL THERAPIST

## 2025-05-14 ASSESSMENT — ACTIVITIES OF DAILY LIVING (ADL): HOME_MANAGEMENT_TIME_ENTRY: 30

## 2025-05-14 NOTE — PROGRESS NOTES
Occupational Therapy    Occupational Therapy Treatment    Name: Robyn Madera  MRN: [id number]  : 1945  Date: 2025  Time Calculation  Start Time: 1500  Stop Time: 1600  Time Calculation (min): 60 min     OT Therapeutic Procedures Time Entry  Manual Therapy Time Entry: 30  Self Care/Home Management (ADLs) Time Entry: 30     Visit number: 3    Assessment:     Pain after treatment none.  Pt has been in compression, performing HEP, self MLD and elevating for 4 weeks.  Lymphedema is a chronic, progressive, regional disease affecting the soft tissue, skin, lymphatic vessels and nodes with protein rich fluid caused by mechanical failure.  Recommend a pneumatic compression pump in a home program to further reduce/maintain his lymphedema for long term home management.   Decrease in girth Right and left.  More of a decrease after use of the pump.     Plan:    Continue one time a week.     Subjective   General:    Pt stated she spoke with the rep from [employer] for garments.  States her hip replacement has been scheduled for early .  Name and  confirmed  Objective    Therapy/Activity:   SELF CARE: All questions regarding the pump have been answered.  Discussed all aspects of home management.  Will discuss self MLD next treatment.   MANUAL: pt was trialed with the pneumatic compression pump with the rep from [employer].  She was instructed on use, care, application and precautions.       Lymphedema Assessment       Right Lower Extremity:  R Metatarsal (cm): 22 cm  R Heel Y Angle: 29.5 cm  R Ankle (cm): 24 cm  R 10 cm Above Ankle (cm): 22.5 cm  R 20 cm Above Ankle (cm): 30.5 cm  R 30 cm Above Ankle (cm): 36 cm  R 40 cm Above Ankle (cm): 0 cm  R Knee (cm): 0 cm  R 10 cm Above Knee (cm): 0 cm  R 20 cm Above Knee (cm): 0 cm  R 30 cm Above Knee (cm): 0 cm  R 40 cm Above Knee (cm): 0 cm  R 50 cm Above Knee (cm): 0 cm  Right lower extremity total: 164.5  Left Lower Extremity:  L Metatarsal (cm): 20.6 cm  L Heel  Y Angle: 30 cm  L Ankle (cm): 24 cm  L 10 cm Above Ankle (cm): 23.2 cm  L 20 cm Above Ankle (cm): 31.5 cm  L 30 cm Above Ankle (cm): 33.5 cm  L 40 cm Above Ankle (cm): 0 cm  L Knee (cm): 0 cm  L 10 cm Above Knee (cm): 0 cm  L 20 cm Above Knee (cm): 0 cm  L 30 cm Above Knee (cm): 0 cm  L 40 cm Above Knee (cm): 0 cm  L 50 cm Above Knee (cm): 0 cm  Left Lower extremity total: 162.8  LE Skin Appearance/Condition and Girth:  Edema - Pitting: yes  Hyperpigmentation: yes    Pain Assessment:  Hip pain, no number given.   Therapy Precautions:   Standard lymphedema precautions    OP EDUCATION:  Use of pump.     Goals:  Active       OT Goals       LTG: Pt will show improvement on the LLIS impairment scale to no greater than 20% impaired.         Start:  04/22/25    Expected End:  07/15/25            STG: Decrease girth in bilateral LEs by 10 cm each for improved mobility and better clothing/shoe fit.         Start:  04/22/25    Expected End:  07/15/25            STG: Pt will be independent with self management, including use of garments, pump if indicated, self MLD, ability to recognize signs of infection/cellulitis and exercise/HEP to minimize risk of progression of symptoms and skin infections/cellulitis.        Start:  04/22/25    Expected End:  07/15/25            Client stated goal to manage lymphedema swelling before having hip surgery to avoid complications and have a better recovery overall       Start:  04/22/25    Expected End:  07/15/25

## 2025-05-15 ENCOUNTER — DOCUMENTATION (OUTPATIENT)
Dept: CARDIOLOGY | Facility: HOSPITAL | Age: 80
End: 2025-05-15
Payer: MEDICARE

## 2025-05-15 NOTE — PROGRESS NOTES
Request from [facility]  for Left Total Hip Arthroplasty    Diagnosis/what are we seeing for:   HTN and dyslipidemia.     Last ischemic work up stress test  Date: 2025  Last echocardiogram 2025  Anticoagulation: None  Antiplatelet: ASA  Has patient had a recent cardioversion or ablation?  no     Last seen by  2024    Next appointment:  2025                       [address] [place] [address]                   Phone# [phone]              Fax# [phone]      Date: 05/15/25    RE: Robyn Madera            : 1945       Surgical/Procedural Clearance for:  Hip surgery  Patient is at: LOW cardiovascular risk for this INTERMEDIATE risk procedure.           N/A hold Antiplatelet      N/A hold Anticoagulant prior                     Is further cardiac workup is needed prior to the procedure?  No     Patient should continue Beta Blocker in the perioperative period.  Yes     Patient should resume antiplatelet/anticoagulation as soon as cleared by surgeon/procedure physician.  N/A       Thank You,    05/15/25 at 4:41 PM - Carl William MD

## 2025-05-16 DIAGNOSIS — M47.816 LUMBAR SPONDYLOSIS: ICD-10-CM

## 2025-05-16 DIAGNOSIS — M54.16 LUMBAR RADICULOPATHY: ICD-10-CM

## 2025-05-16 DIAGNOSIS — M48.061 SPINAL STENOSIS OF LUMBAR REGION, UNSPECIFIED WHETHER NEUROGENIC CLAUDICATION PRESENT: ICD-10-CM

## 2025-05-16 RX ORDER — OXYCODONE HYDROCHLORIDE 5 MG/1
5 TABLET ORAL 2 TIMES DAILY PRN
Qty: 60 TABLET | Refills: 0 | Status: SHIPPED | OUTPATIENT
Start: 2025-05-18 | End: 2025-06-17

## 2025-05-16 NOTE — TELEPHONE ENCOUNTER
Requested refill for Oxycodone 5mg IR BID #60 for 30 days.   LV: 3/10/25  NV: 6/4/25  LAST SD: 4/18/25  KERMIT FD: 4/18/25  Awa Waddell

## 2025-05-21 ENCOUNTER — TREATMENT (OUTPATIENT)
Dept: OCCUPATIONAL THERAPY | Facility: HOSPITAL | Age: 80
End: 2025-05-21
Payer: MEDICARE

## 2025-05-21 DIAGNOSIS — I89.0 LYMPHEDEMA: Primary | ICD-10-CM

## 2025-05-21 PROCEDURE — 97535 SELF CARE MNGMENT TRAINING: CPT | Mod: GO | Performed by: OCCUPATIONAL THERAPIST

## 2025-05-21 ASSESSMENT — ACTIVITIES OF DAILY LIVING (ADL): HOME_MANAGEMENT_TIME_ENTRY: 60

## 2025-05-21 NOTE — PROGRESS NOTES
Occupational Therapy    Occupational Therapy Treatment    Name: Robyn Madera  MRN: [id number]  : 1945  Date: 2025  Time Calculation  Start Time: 1500  Stop Time: 1600  Time Calculation (min): 60 min     OT Therapeutic Procedures Time Entry  Self Care/Home Management (ADLs) Time Entry: 60         Visit number: 4    Assessment:  Slight increase in girth right and left.   Pain after treatment none  Plan:  fit and educate on garments when they arrive       Subjective   General:    Pt states she sent payment for garments.    Name and  confirmed  Objective    Therapy/Activity:   SELF CARE: Pt is wearing a single layer tg  size E.  Double layer would be too difficult for her to don.  Discussed self MLD and can use a ball for her feet and towel to reach her feet.  Pt completed partial session of self MLD.  Per her request, went through the handout with her. Demonstrated and had her practice varius hand placements.   Gave handout for lower extremity sitting and standing exercises.   Lymphedema Assessment       Right Lower Extremity:  R Metatarsal (cm): 22 cm  R Heel Y Angle: 29.5 cm  R Ankle (cm): 24.5 cm  R 10 cm Above Ankle (cm): 23 cm  R 20 cm Above Ankle (cm): 32 cm  R 30 cm Above Ankle (cm): 36.5 cm  R 40 cm Above Ankle (cm): 0 cm  R Knee (cm): 0 cm  R 10 cm Above Knee (cm): 0 cm  R 20 cm Above Knee (cm): 0 cm  R 30 cm Above Knee (cm): 0 cm  R 40 cm Above Knee (cm): 0 cm  R 50 cm Above Knee (cm): 0 cm  Right lower extremity total: 167.5  Left Lower Extremity:  L Metatarsal (cm): 21.5 cm  L Heel Y Angle: 30 cm  L Ankle (cm): 25 cm  L 10 cm Above Ankle (cm): 24 cm  L 20 cm Above Ankle (cm): 32.5 cm  L 30 cm Above Ankle (cm): 33.8 cm  L 40 cm Above Ankle (cm): 0 cm  L Knee (cm): 0 cm  L 10 cm Above Knee (cm): 0 cm  L 20 cm Above Knee (cm): 0 cm  L 30 cm Above Knee (cm): 0 cm  L 40 cm Above Knee (cm): 0 cm  L 50 cm Above Knee (cm): 0 cm  Left Lower extremity total: 166.8  LE Skin Appearance/Condition  and Girth:       Pain Assessment: none     Therapy Precautions:   Standard lymphedema precautions    OP EDUCATION:  Self MLD and exercises/HEP     Goals:  Active       OT Goals       LTG: Pt will show improvement on the LLIS impairment scale to no greater than 20% impaired.         Start:  04/22/25    Expected End:  07/15/25            STG: Decrease girth in bilateral LEs by 10 cm each for improved mobility and better clothing/shoe fit.         Start:  04/22/25    Expected End:  07/15/25            STG: Pt will be independent with self management, including use of garments, pump if indicated, self MLD, ability to recognize signs of infection/cellulitis and exercise/HEP to minimize risk of progression of symptoms and skin infections/cellulitis.        Start:  04/22/25    Expected End:  07/15/25            Client stated goal to manage lymphedema swelling before having hip surgery to avoid complications and have a better recovery overall       Start:  04/22/25    Expected End:  07/15/25

## 2025-05-28 ENCOUNTER — APPOINTMENT (OUTPATIENT)
Dept: OCCUPATIONAL THERAPY | Facility: HOSPITAL | Age: 80
End: 2025-05-28
Payer: MEDICARE

## 2025-05-28 DIAGNOSIS — I89.0 LYMPHEDEMA: Primary | ICD-10-CM

## 2025-05-28 PROCEDURE — 97140 MANUAL THERAPY 1/> REGIONS: CPT | Mod: GO | Performed by: OCCUPATIONAL THERAPIST

## 2025-05-28 PROCEDURE — 97110 THERAPEUTIC EXERCISES: CPT | Mod: GO | Performed by: OCCUPATIONAL THERAPIST

## 2025-05-28 PROCEDURE — 97535 SELF CARE MNGMENT TRAINING: CPT | Mod: GO | Performed by: OCCUPATIONAL THERAPIST

## 2025-05-28 ASSESSMENT — ACTIVITIES OF DAILY LIVING (ADL): HOME_MANAGEMENT_TIME_ENTRY: 15

## 2025-05-28 NOTE — PROGRESS NOTES
Occupational Therapy    Occupational Therapy Treatment    Name: Robyn Madera  MRN: [id number]  : 1945  Date: 2025  Time Calculation  Start Time: 1300  Stop Time: 1400  Time Calculation (min): 60 min     OT Therapeutic Procedures Time Entry  Manual Therapy Time Entry: 30  Self Care/Home Management (ADLs) Time Entry: 15  Therapeutic Exercise Time Entry: 15        Visit number: 5    Assessment:     Pain after treatment none reported. Slight decrease in girth.  Plan:    Follow up in 2 weeks.     Subjective   General:  pt has not received the garments or the pump.     Name and  confirmed  Objective    Therapy/Activity:   SELF CARE: Discussed home management.  Reviewed wearing schedule for the inelastic wraps. Continued education regarding the lymphatic system, lymphedema and treatment.   MANUAL: Provided manual lymph drainage for bilateral LEs.   Utilized the anterior approach, supraclavicular notches, abdominal series, TERESA inguinals and TERESA LEs.  Reviewed self MLD.    THERAPEUTIC EXERCISE: Reviewed diaphragmatic breathing.  Reviewed HEP.  Lymphedema Assessment       Right Lower Extremity:  R Metatarsal (cm): 22.5 cm  R Heel Y Angle: 30.2 cm  R Ankle (cm): 22.5 cm  R 10 cm Above Ankle (cm): 22.5 cm  R 20 cm Above Ankle (cm): 30 cm  R 30 cm Above Ankle (cm): 36.5 cm  R 40 cm Above Ankle (cm): 0 cm  R Knee (cm): 0 cm  R 10 cm Above Knee (cm): 0 cm  R 20 cm Above Knee (cm): 0 cm  R 30 cm Above Knee (cm): 0 cm  R 40 cm Above Knee (cm): 0 cm  R 50 cm Above Knee (cm): 0 cm  Right lower extremity total: 164.2  Left Lower Extremity:  L Metatarsal (cm): 22 cm  L Heel Y Angle: 31 cm  L Ankle (cm): 25.5 cm  L 10 cm Above Ankle (cm): 22.5 cm  L 20 cm Above Ankle (cm): 29 cm  L 30 cm Above Ankle (cm): 34.5 cm  L 40 cm Above Ankle (cm): 0 cm  L Knee (cm): 0 cm  L 10 cm Above Knee (cm): 0 cm  L 20 cm Above Knee (cm): 0 cm  L 30 cm Above Knee (cm): 0 cm  L 40 cm Above Knee (cm): 0 cm  L 50 cm Above Knee (cm): 0  cm  Left Lower extremity total: 164.5  LE Skin Appearance/Condition and Girth:  Edema - Pitting: yes  Hyperpigmentation: yes    Pain Assessment:  Hip pain with certain movements, no number   Therapy Precautions:   Standard lymphedema precautions     OP EDUCATION:   Home management    Goals:  Active       OT Goals       LTG: Pt will show improvement on the LLIS impairment scale to no greater than 20% impaired.         Start:  04/22/25    Expected End:  07/15/25            STG: Decrease girth in bilateral LEs by 10 cm each for improved mobility and better clothing/shoe fit.         Start:  04/22/25    Expected End:  07/15/25            STG: Pt will be independent with self management, including use of garments, pump if indicated, self MLD, ability to recognize signs of infection/cellulitis and exercise/HEP to minimize risk of progression of symptoms and skin infections/cellulitis.        Start:  04/22/25    Expected End:  07/15/25            Client stated goal to manage lymphedema swelling before having hip surgery to avoid complications and have a better recovery overall       Start:  04/22/25    Expected End:  07/15/25

## 2025-06-02 LAB
ALBUMIN SERPL-MCNC: 4.3 G/DL (ref 3.6–5.1)
ALP SERPL-CCNC: 105 U/L (ref 37–153)
ALT SERPL-CCNC: 25 U/L (ref 6–29)
ANION GAP SERPL CALCULATED.4IONS-SCNC: 11 MMOL/L (CALC) (ref 7–17)
AST SERPL-CCNC: 27 U/L (ref 10–35)
BASOPHILS # BLD AUTO: 21 CELLS/UL (ref 0–200)
BASOPHILS NFR BLD AUTO: 0.3 %
BILIRUB SERPL-MCNC: 0.8 MG/DL (ref 0.2–1.2)
BUN SERPL-MCNC: 14 MG/DL (ref 7–25)
CALCIUM SERPL-MCNC: 9.1 MG/DL (ref 8.6–10.4)
CHLORIDE SERPL-SCNC: 103 MMOL/L (ref 98–110)
CO2 SERPL-SCNC: 26 MMOL/L (ref 20–32)
CREAT SERPL-MCNC: 0.62 MG/DL (ref 0.6–1)
EGFRCR SERPLBLD CKD-EPI 2021: 91 ML/MIN/1.73M2
EOSINOPHIL # BLD AUTO: 280 CELLS/UL (ref 15–500)
EOSINOPHIL NFR BLD AUTO: 4 %
ERYTHROCYTE [DISTWIDTH] IN BLOOD BY AUTOMATED COUNT: 13.2 % (ref 11–15)
GLUCOSE SERPL-MCNC: 127 MG/DL (ref 65–99)
HCT VFR BLD AUTO: 39.8 % (ref 35–45)
HGB BLD-MCNC: 12.9 G/DL (ref 11.7–15.5)
LYMPHOCYTES # BLD AUTO: 1393 CELLS/UL (ref 850–3900)
LYMPHOCYTES NFR BLD AUTO: 19.9 %
MCH RBC QN AUTO: 30.9 PG (ref 27–33)
MCHC RBC AUTO-ENTMCNC: 32.4 G/DL (ref 32–36)
MCV RBC AUTO: 95.2 FL (ref 80–100)
MONOCYTES # BLD AUTO: 847 CELLS/UL (ref 200–950)
MONOCYTES NFR BLD AUTO: 12.1 %
NEUTROPHILS # BLD AUTO: 4459 CELLS/UL (ref 1500–7800)
NEUTROPHILS NFR BLD AUTO: 63.7 %
PLATELET # BLD AUTO: 264 THOUSAND/UL (ref 140–400)
PMV BLD REES-ECKER: 11.8 FL (ref 7.5–12.5)
POTASSIUM SERPL-SCNC: 4 MMOL/L (ref 3.5–5.3)
PROT SERPL-MCNC: 7.8 G/DL (ref 6.1–8.1)
RBC # BLD AUTO: 4.18 MILLION/UL (ref 3.8–5.1)
SODIUM SERPL-SCNC: 140 MMOL/L (ref 135–146)
T3FREE SERPL-MCNC: 3.1 PG/ML (ref 2.3–4.2)
T4 FREE SERPL-MCNC: 1.1 NG/DL (ref 0.8–1.8)
TSH SERPL-ACNC: 0.08 MIU/L (ref 0.4–4.5)
WBC # BLD AUTO: 7 THOUSAND/UL (ref 3.8–10.8)

## 2025-06-03 ENCOUNTER — TELEPHONE (OUTPATIENT)
Facility: CLINIC | Age: 80
End: 2025-06-03
Payer: MEDICARE

## 2025-06-03 LAB
ALBUMIN SERPL-MCNC: 4.3 G/DL (ref 3.6–5.1)
ALP SERPL-CCNC: 106 U/L (ref 37–153)
ALT SERPL-CCNC: 24 U/L (ref 6–29)
ANION GAP SERPL CALCULATED.4IONS-SCNC: 12 MMOL/L (CALC) (ref 7–17)
AST SERPL-CCNC: 26 U/L (ref 10–35)
BILIRUB SERPL-MCNC: 0.7 MG/DL (ref 0.2–1.2)
BUN SERPL-MCNC: 14 MG/DL (ref 7–25)
CALCIUM SERPL-MCNC: 9.2 MG/DL (ref 8.6–10.4)
CHLORIDE SERPL-SCNC: 102 MMOL/L (ref 98–110)
CHOLEST SERPL-MCNC: 164 MG/DL
CHOLEST/HDLC SERPL: 2 (CALC)
CO2 SERPL-SCNC: 26 MMOL/L (ref 20–32)
CREAT SERPL-MCNC: 0.61 MG/DL (ref 0.6–1)
EGFRCR SERPLBLD CKD-EPI 2021: 91 ML/MIN/1.73M2
EST. AVERAGE GLUCOSE BLD GHB EST-MCNC: 137 MG/DL
EST. AVERAGE GLUCOSE BLD GHB EST-SCNC: 7.6 MMOL/L
GLUCOSE SERPL-MCNC: 130 MG/DL (ref 65–99)
HBA1C MFR BLD: 6.4 %
HDLC SERPL-MCNC: 83 MG/DL
LDLC SERPL CALC-MCNC: 68 MG/DL (CALC)
LDLC SERPL DIRECT ASSAY-MCNC: 75 MG/DL
NONHDLC SERPL-MCNC: 81 MG/DL (CALC)
POTASSIUM SERPL-SCNC: 4 MMOL/L (ref 3.5–5.3)
PROT SERPL-MCNC: 7.8 G/DL (ref 6.1–8.1)
SODIUM SERPL-SCNC: 140 MMOL/L (ref 135–146)
T4 FREE SERPL-MCNC: 1 NG/DL (ref 0.8–1.8)
TRIGL SERPL-MCNC: 57 MG/DL
TSH SERPL-ACNC: 0.08 MIU/L (ref 0.4–4.5)
VIT B12 SERPL-MCNC: 1595 PG/ML (ref 200–1100)

## 2025-06-03 NOTE — TELEPHONE ENCOUNTER
Patient called in asking for you to review her blood work results. Pt is concerned that her vitamin b 12 level is elevated. Pt would like to know if the methimazole dose increase is causing ehr vitmain b12 to be elevated. Pt is on a vitamin b 12 1000mg supplement,

## 2025-06-04 ENCOUNTER — OFFICE VISIT (OUTPATIENT)
Dept: PAIN MEDICINE | Facility: HOSPITAL | Age: 80
End: 2025-06-04
Payer: MEDICARE

## 2025-06-04 VITALS
HEIGHT: 66 IN | WEIGHT: 149.6 LBS | RESPIRATION RATE: 18 BRPM | DIASTOLIC BLOOD PRESSURE: 77 MMHG | BODY MASS INDEX: 24.04 KG/M2 | HEART RATE: 101 BPM | OXYGEN SATURATION: 96 % | SYSTOLIC BLOOD PRESSURE: 137 MMHG

## 2025-06-04 DIAGNOSIS — M48.062 SPINAL STENOSIS OF LUMBAR REGION WITH NEUROGENIC CLAUDICATION: ICD-10-CM

## 2025-06-04 DIAGNOSIS — M16.11 PRIMARY OSTEOARTHRITIS OF RIGHT HIP: Primary | ICD-10-CM

## 2025-06-04 DIAGNOSIS — M54.16 CHRONIC LUMBAR RADICULOPATHY: ICD-10-CM

## 2025-06-04 PROCEDURE — 1036F TOBACCO NON-USER: CPT | Performed by: CLINICAL NURSE SPECIALIST

## 2025-06-04 PROCEDURE — G2211 COMPLEX E/M VISIT ADD ON: HCPCS | Performed by: CLINICAL NURSE SPECIALIST

## 2025-06-04 PROCEDURE — 1160F RVW MEDS BY RX/DR IN RCRD: CPT | Performed by: CLINICAL NURSE SPECIALIST

## 2025-06-04 PROCEDURE — 3078F DIAST BP <80 MM HG: CPT | Performed by: CLINICAL NURSE SPECIALIST

## 2025-06-04 PROCEDURE — 1125F AMNT PAIN NOTED PAIN PRSNT: CPT | Performed by: CLINICAL NURSE SPECIALIST

## 2025-06-04 PROCEDURE — 3075F SYST BP GE 130 - 139MM HG: CPT | Performed by: CLINICAL NURSE SPECIALIST

## 2025-06-04 PROCEDURE — 1159F MED LIST DOCD IN RCRD: CPT | Performed by: CLINICAL NURSE SPECIALIST

## 2025-06-04 PROCEDURE — 99214 OFFICE O/P EST MOD 30 MIN: CPT | Performed by: CLINICAL NURSE SPECIALIST

## 2025-06-04 ASSESSMENT — ENCOUNTER SYMPTOMS
OCCASIONAL FEELINGS OF UNSTEADINESS: 1
DEPRESSION: 0
LOSS OF SENSATION IN FEET: 0

## 2025-06-04 ASSESSMENT — PAIN SCALES - GENERAL: PAINLEVEL_OUTOF10: 3

## 2025-06-04 NOTE — PROGRESS NOTES
Follow Up Visit    Location of Pain: low back pain, primarily on left side, has less pain and numbness with meds-duloxetine and gabapentin have been helping.   Left hip replacement scheduled for 6/16/25       Pain Score: 3/10    Treatment:  Oxycodone 5 mg BID   Last dose: today  Medication Count:  35  Fill date: 5/16/25    Efficacy: 85%    Side Effects: none    Narcan: Exp 12/25    Other pain medication/neuromodulator: duloxetine, gabapentin, tylenol prn    Therapeutic Goals:    Injections and/or Procedures: several years ago    Other: PT for lymphedema, ice for back pain    Genetic Swab:  Urine Screen: 3/10/25  Narcotics Agreement: 9/16/24  ORT: 9/16/24  PDUQ: 3/10/25  OA: 6/4/25    Subjective   Patient ID: Robyn Madera is a 80 y.o. female who presents for low back pain.  HPI    80-year-old female presenting with lower back pain radiating to bilateral lower extremities with symptoms of claudication.  Low back symptoms unchanged from previous exam. She also experiences generalized polyarticular pain.  Scheduled for left CARLOS EDUARDO at the [facility] on 6/16/2025.  Followed by vascular surgery status post left iliac stent with Dr. Tolbert on 3/21/2025 and right iliac stent on 8/9/2024.  Following with lymphedema clinic chronic lower extremity edema and is awaiting compression devices for home use.  Manages her pain with oxycodone 5 mg 2 times per day as needed gabapentin 300 mg 3 times daily, duloxetine 90 mg daily.  She does feel that her medication regimen keeps her pain tolerable and allows her to remain active.    OARRS:  Vibha Bueno, APRN-CNP, APRN-CNS on 6/4/2025 11:26 AM  I have personally reviewed the OARRS report for Robyn Madera. I have considered the risks of abuse, dependence, addiction and diversion    Is the patient prescribed a combination of a benzodiazepine and opioid?  No    Last Urine Drug Screen / ordered today: No  Recent Results (from the past 8760 hours)   Buprenorphine Confirm,Urine     Collection Time: 03/10/25 11:46 AM   Result Value Ref Range    Buprenorphine NEGATIVE <2 ng/mL    Norbuprenorphine NEGATIVE <2 ng/mL    Naloxone NEGATIVE <2 ng/mL    Buprenorphine Comments     Tapentadol Confirmation, Urine    Collection Time: 03/10/25 11:46 AM   Result Value Ref Range    Tapentadol NEGATIVE <50 ng/mL    Nortapentadol NEGATIVE <50 ng/mL    Tapentadol Comments      Notes and Comments     Opiate/Opioid/Benzo Prescription Compliance    Collection Time: 03/10/25 11:46 AM   Result Value Ref Range    Creatinine 37.9 > or = 20.0 mg/dL    pH 7.0 4.5 - 9.0    Oxidant NEGATIVE <200 mcg/mL    Amphetamines NEGATIVE <500 ng/mL    Barbiturates NEGATIVE <300 ng/mL    Cocaine Metabolite NEGATIVE <150 ng/mL    Marijuana Metabolite NEGATIVE <20 ng/mL    Phencyclidine NEGATIVE <25 ng/mL    Alphahydroxyalprazolam NEGATIVE <25 ng/mL    Alphahydroxymidazolam NEGATIVE <50 ng/mL    Alphahydroxytriazolam NEGATIVE <50 ng/mL    Aminoclonazepam NEGATIVE <25 ng/mL    Hydroxyethylflurazepam NEGATIVE <50 ng/mL    Lorazepam NEGATIVE <50 ng/mL    Nordiazepam NEGATIVE <50 ng/mL    Oxazepam NEGATIVE <50 ng/mL    Temazepam NEGATIVE <50 ng/mL    Benzodiazepines Comments      Fentanyl NEGATIVE <0.5 ng/mL    Norfentanyl NEGATIVE <0.5 ng/mL    Fentanyl Comments      6 Acetylmorphine NEGATIVE <10 ng/mL    Heroin Metab Comments      EDDP NEGATIVE <100 ng/mL    Methadone NEGATIVE <100 ng/mL    Methadone Comments      Codeine NEGATIVE <50 ng/mL    Hydrocodone NEGATIVE <50 ng/mL    Hydromorphone NEGATIVE <50 ng/mL    Morphine NEGATIVE <50 ng/mL    Norhydrocodone NEGATIVE <50 ng/mL    Opiates Comments      Noroxycodone 956 (H) <50 ng/mL    Oxycodone 600 (H) <50 ng/mL    Oxymorphone 133 (H) <50 ng/mL    Oxycodone Comments      Desmethyltramadol NEGATIVE <100 ng/mL    Tramadol NEGATIVE <100 ng/mL    Tramadol Comments      Zolpidem NEGATIVE <5 ng/mL    Zolpidem Metabolite NEGATIVE <5 ng/mL    Zolpidem Comments     Screen Opiate/Opioid/Benzo  Prescription Compliance    Collection Time: 09/16/24 11:51 AM   Result Value Ref Range    Creatinine, Urine Random 48.0 20.0 - 320.0 mg/dL    Amphetamine Screen, Urine Presumptive Negative Presumptive Negative    Barbiturate Screen, Urine Presumptive Negative Presumptive Negative    Cannabinoid Screen, Urine Presumptive Negative Presumptive Negative    Cocaine Metabolite Screen, Urine Presumptive Negative Presumptive Negative    PCP Screen, Urine Presumptive Negative Presumptive Negative     Results are as expected.     Controlled Substance Agreement:  Date of the Last Agreement:  9/16/24  Reviewed Controlled Substance Agreement including but not limited to the benefits, risks, and alternatives to treatment with a Controlled Substance medication(s).    Monitoring and compliance:    ORT: 9/16/24    PDUQ: 3/10/25    Office Agreement: 6/4/25      Review of Systems    ROS:   General: No fevers, chills, weight loss  Skin: Negative for lesions  Eyes: No acute vision changes  Ears: No vertigo  Nose, mouth, throat: No difficulty swallowing or speaking  Respiratory: No cough, shortness of breath, cyanosis  Cardiovascular: Negative for chest pain syncope or palpitation  Gastrointestinal: No constipation, nausea, vomiting  Neurological: Negative for headache, positive for: Paresthesia  Psychological: Negative for severe or debilitating anxiety, depression. Negative memory loss  Musculoskeletal: Positive for arthralgia, myalgia and pain  Endocrine: Negative for weight gain, appetite changes, excessive sweating  Allergy/immune: Negative    All 13 systems were reviewed and are within normal levels except as noted or in the history of present illness.  Positive or pertinent negative responses are noted or were in the history of present illness. As noted, the patient denies significant or impairing weakness in the bilateral upper and lower extremities, medication induced constipation, and bowel or bladder incontinence.   Current  "Medications[1]     Medical History[2]     Surgical History[3]     Family History[4]     RX Allergies[5]     Objective     Visit Vitals  /77   Pulse 101   Resp 18   Ht 1.676 m (5' 6\")   Wt 67.9 kg (149 lb 9.6 oz)   SpO2 96%   BMI 24.15 kg/m²   OB Status Postmenopausal   Smoking Status Never   BSA 1.78 m²        Physical Exam    PE:  General: Well-developed, well-nourished, no acute distress. The patient demonstrates no pain behavior, symptom magnification or overt drug-seeking behavior.  Eye: Pupils appropriate for room lighting  Neck/thyroid: No obvious goiter or enlargement of neck noted  Respiratory exam: Normal respiratory effort, unlabored respiration. No accessory muscle use noted  Cardiac exam: Bilateral radial pulses intact  Abdominal: Nondistended  Spine, lumbar: The patient is able to rise from a seated to standing position some hesitancy.  Gait is slow, deliberate and slightly forward leaning.  Favors right lower extremity.   Neurologic exam: Muscle strength is antigravity in all 4 extremities.  Psychiatric exam: Judgment and insight normal, affect normal, speech is fluent, affect appropriate, demonstrating no signs of hypersomnolence, sedation, or confusion        Assessment/Plan   Problem List Items Addressed This Visit           ICD-10-CM    Primary osteoarthritis of right hip - Primary M16.11    Spinal stenosis of lumbar region M48.061    80-year-old female with lower back pain with lower extremity claudication symptoms presenting for routine follow-up.  Previously reviewed MRI of her lumbar spine consistent with some scoliosis and significant degenerative changes throughout her lumbar spine with the worst levels being L3-4 and L4-5 with some ligamentum flavum hypertrophy at both of those levels causing central canal stenosis; also some degree of foraminal stenosis but the central canal stenosis appears to be much worse. Results of MRI reviewed by Dr. Merchant with patient at her last office " visit.  Brief discussion about minimally invasive lumbar decompression for ligamentum flavum hypertrophy that could possibly be an option at L3-4 and L4-5.  At that time discussed that she needed to continue vascular treatment as well as proceed with left CARLOS EDUARDO before considering further intervention such as MILD.  As far as medication, it is appropriate to continue with her current medication regimen as she is doing very well and feels that it provides significant relief.  Plan reviewed with patient at today's visit.      - OARRS reviewed no issues, pill count appropriate and continues to receive good benefit from very low-dose opioid.  It is reasonable to continue with very low-dose oxycodone no more than 2/day.  - We will continue with duloxetine 90 mg daily,  - Recommend pain management by her orthopedic surgeon after her total hip arthroplasty.  She will most likely be managed with a higher dose of short acting opiate from her surgeon.  Recommend no more than a week of a higher dose of opiate.  At that point she may return to her oxycodone as prescribed by our office.  Recommend she continue on her duloxetine 90 mg daily and gabapentin 300 mg 3 times daily during the pre and perioperative phase of the total hip arthroplasty for additional pain relief.  - As above if she could be a candidate for MILD once vascular issues are stable and she has recovered from her total hip arthroplasty.    -Patient will follow-up in 3 months or sooner if needed.         Chronic lumbar radiculopathy M54.16            [1]   Current Outpatient Medications:     acetaminophen (Tylenol) 500 mg tablet, Take 1 tablet (500 mg) by mouth once daily., Disp: , Rfl:     amLODIPine (Norvasc) 10 mg tablet, Take 1 tablet (10 mg) by mouth once daily., Disp: 90 tablet, Rfl: 3    aspirin 81 mg EC tablet, Take 1 tablet (81 mg) by mouth once daily., Disp: , Rfl:     atorvastatin (Lipitor) 40 mg tablet, Take 1 tablet (40 mg) by mouth once daily at  bedtime., Disp: 90 tablet, Rfl: 3    busPIRone (Buspar) 15 mg tablet, Take 1 tablet (15 mg) by mouth 2 times a day., Disp: 180 tablet, Rfl: 1    calcium carbonate-vitamin D3 500 mg-3.125 mcg (125 unit) tablet tablet, Take by mouth., Disp: , Rfl:     cyanocobalamin (Vitamin B-12) 1,000 mcg tablet, Take 1 tablet (1,000 mcg) by mouth once daily., Disp: , Rfl:     docusate sodium (Colace) 100 mg capsule, Take by mouth twice a day., Disp: , Rfl:     DULoxetine (Cymbalta) 30 mg DR capsule, Take 1 capsule (30 mg) by mouth once daily. Do not crush or chew.  Patient will take duloxetine 30 mg and 60 mg capsule daily for a total dose of 90 mg daily., Disp: 90 capsule, Rfl: 1    DULoxetine (Cymbalta) 60 mg DR capsule, Take 1 capsule (60 mg) by mouth once daily. Do not crush or chew.  Will take duloxetine 60 mg and 30 mg capsule daily for a total dose of 90 mg daily., Disp: 90 capsule, Rfl: 1    esomeprazole (NexIUM) 20 mg DR capsule, Take 1 capsule (20 mg) by mouth once daily., Disp: , Rfl:     fesoterodine (Toviaz) 4 mg tablet extended release 24 hr, Take 1 tablet (4 mg) by mouth once daily., Disp: 90 tablet, Rfl: 3    gabapentin (Neurontin) 100 mg capsule, Take 3 capsules (300 mg) by mouth 3 times a day., Disp: 810 capsule, Rfl: 0    lactase (Lactaid) 3,000 unit tablet, Take 1 tablet (3,000 Units) by mouth 3 times a day as needed (for dairy consumption)., Disp: , Rfl:     lubiprostone (Amitiza) 8 mcg capsule, Take 1 capsule (8 mcg) by mouth 2 times daily (morning and late afternoon). Take with meals, Disp: 60 capsule, Rfl: 11    methIMAzole (Tapazole) 10 mg tablet, Take 1 tablet (10 mg) by mouth once daily., Disp: 90 tablet, Rfl: 1    mirabegron (Myrbetriq) 50 mg tablet extended release 24 hr 24 hr tablet, TAKE 1 TABLET(50 MG) BY MOUTH DAILY, Disp: 90 tablet, Rfl: 11    nystatin (Mycostatin) 100,000 unit/mL suspension, Swish and swallow 5 mL by mouth 4 times daily for 10 days, Disp: 200 mL, Rfl: 0    omeprazole OTC (PriLOSEC  OTC) 20 mg EC tablet, Take 1 tablet (20 mg) by mouth once daily in the morning. Take before meals. Do not crush, chew, or split., Disp: , Rfl:     oxyCODONE (Roxicodone) 5 mg immediate release tablet, Take 1 tablet (5 mg) by mouth 2 times a day as needed for severe pain (7 - 10). Do not fill before May 18, 2025., Disp: 60 tablet, Rfl: 0    diazePAM (Valium) 5 mg tablet, Take 1 tablet (5 mg) by mouth see administration instructions for 1 day. Patient may take diazepam 5 mg 1 to 2 tablets prior to her MRI, Disp: 2 tablet, Rfl: 0    diclofenac sodium (Voltaren) 1 % gel, Apply 4.5 inches (4 g) topically 4 times a day as needed (for knee pain)., Disp: , Rfl:   [2]   Past Medical History:  Diagnosis Date    Abnormal findings on diagnostic imaging of heart and coronary circulation 11/07/2019    Abnormal Heart Score CT    Abnormal levels of other serum enzymes 10/24/2019    Abnormal cardiac enzyme level    Allergic rhinitis due to animal hair or dander 10/28/2013    Anxiety 2023    Arthritis 2016    Chronic constipation 2022    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/2014    Disease of thyroid gland 2018    Diverticulosis of colon 06/17/2013    Diverticulosis of intestine, part unspecified, without perforation or abscess without bleeding     Diverticulosis    Dizziness     positional    Gastric polyp 06/23/2014    GERD (gastroesophageal reflux disease) 2008    Glaucoma suspect, both eyes 01/08/2015    HL (hearing loss) 2022    Hx of squamous cell carcinoma of skin 06/09/2015    Hyperlipidemia     Hypertension 2015    Internal hemorrhoid 03/21/2016    Irregular heart beat     Low TSH level 03/10/2023    Lung nodule, solitary 03/10/2023    Lung nodule, solitary 03/10/2023    Stable CT 6/23  No follow up needed    Mild intermittent asthma without complication ([facility]) 02/24/2016    Other microscopic hematuria 03/01/2018    Microscopic hematuria    Other specified abnormal findings of blood chemistry 10/19/2021     Elevated LFTs    Other specified abnormal findings of blood chemistry 10/19/2021    Elevated LFTs    Pain in right knee     Right knee pain    Personal history of other diseases of the digestive system     History of hiatal hernia    Personal history of other diseases of the musculoskeletal system and connective tissue 01/17/2020    History of osteopenia    Personal history of other diseases of the musculoskeletal system and connective tissue     History of herniated intervertebral disc    Personal history of other diseases of the musculoskeletal system and connective tissue     History of chronic back pain    Personal history of other diseases of the nervous system and sense organs     History of cataract    Personal history of other diseases of the nervous system and sense organs     History of glaucoma    Personal history of other diseases of the respiratory system     History of allergic rhinitis    Personal history of other endocrine, nutritional and metabolic disease     History of hyperlipidemia    Personal history of other endocrine, nutritional and metabolic disease     History of Graves' disease    Personal history of other endocrine, nutritional and metabolic disease     History of hypoglycemia    Personal history of other malignant neoplasm of skin     History of malignant neoplasm of skin    Personal history of other mental and behavioral disorders     History of anxiety    Presence of right artificial knee joint 09/21/2017    Scoliosis 2008    Spinal stenosis     Vision loss     Visual impairment 2008    Wears partial dentures     upper    Wrist sprain 03/10/2023   [3]   Past Surgical History:  Procedure Laterality Date    HIP ARTHROPLASTY      right    JOINT REPLACEMENT Bilateral 2017-18    knees    KNEE ARTHROSCOPY W/ DEBRIDEMENT  07/09/2016    Arthroscopy Knee    OTHER SURGICAL HISTORY  10/14/2019    Excision of squamous cell carcinoma   [4]   Family History  Problem Relation Name Age of Onset     Diabetes Mother Jaci Bingham    [5]   Allergies  Allergen Reactions    House Dust Hives    Adhesive Tape-Silicones Other     ADHESIVES CAUSES BLISTERS    Cat Dander Unknown    Penicillins Hives

## 2025-06-04 NOTE — ASSESSMENT & PLAN NOTE
80-year-old female with lower back pain with lower extremity claudication symptoms presenting for routine follow-up.  Previously reviewed MRI of her lumbar spine consistent with some scoliosis and significant degenerative changes throughout her lumbar spine with the worst levels being L3-4 and L4-5 with some ligamentum flavum hypertrophy at both of those levels causing central canal stenosis; also some degree of foraminal stenosis but the central canal stenosis appears to be much worse. Results of MRI reviewed by Dr. Merchant with patient at her last office visit.  Brief discussion about minimally invasive lumbar decompression for ligamentum flavum hypertrophy that could possibly be an option at L3-4 and L4-5.  At that time discussed that she needed to continue vascular treatment as well as proceed with left CARLOS EDUARDO before considering further intervention such as MILD.  As far as medication, it is appropriate to continue with her current medication regimen as she is doing very well and feels that it provides significant relief.  Plan reviewed with patient at today's visit.      - OARRS reviewed no issues, pill count appropriate and continues to receive good benefit from very low-dose opioid.  It is reasonable to continue with very low-dose oxycodone no more than 2/day.  - We will continue with duloxetine 90 mg daily,  - Recommend pain management by her orthopedic surgeon after her total hip arthroplasty.  She will most likely be managed with a higher dose of short acting opiate from her surgeon.  Recommend no more than a week of a higher dose of opiate.  At that point she may return to her oxycodone as prescribed by our office.  Recommend she continue on her duloxetine 90 mg daily and gabapentin 300 mg 3 times daily during the pre and perioperative phase of the total hip arthroplasty for additional pain relief.  - As above if she could be a candidate for MILD once vascular issues are stable and she has recovered from  her total hip arthroplasty.    -Patient will follow-up in 3 months or sooner if needed.

## 2025-06-05 ENCOUNTER — APPOINTMENT (OUTPATIENT)
Dept: PRIMARY CARE | Facility: CLINIC | Age: 80
End: 2025-06-05
Payer: MEDICARE

## 2025-06-11 ENCOUNTER — TREATMENT (OUTPATIENT)
Dept: OCCUPATIONAL THERAPY | Facility: HOSPITAL | Age: 80
End: 2025-06-11
Payer: MEDICARE

## 2025-06-11 DIAGNOSIS — I89.0 LYMPHEDEMA: ICD-10-CM

## 2025-06-11 PROCEDURE — 97535 SELF CARE MNGMENT TRAINING: CPT | Mod: GO | Performed by: OCCUPATIONAL THERAPIST

## 2025-06-11 ASSESSMENT — ACTIVITIES OF DAILY LIVING (ADL): HOME_MANAGEMENT_TIME_ENTRY: 60

## 2025-06-11 NOTE — PROGRESS NOTES
Occupational Therapy    Occupational Therapy Treatment    Name: Robyn Madera  MRN: [id number]  : 1945  Date: 2025  Time Calculation  Start Time: 1100  Stop Time: 1200  Time Calculation (min): 60 min     OT Therapeutic Procedures Time Entry  Self Care/Home Management (ADLs) Time Entry: 60     Visit number: 6    Assessment:     Pain after treatment no change  Plan:    Pt is on hold until after her hip replacement.  Discussed ordering further garments when she returns.     Subjective   General:    Pt received her garments and brought them in with her.     Name and  confirmed  Objective    Therapy/Activity:   SELF CARE: Education provided regarding the inelastic wraps for calf and feet/ankles.  Discussed use, care, application and precautions. Donned the calf wraps and the feet/ankle wraps.  Demonstrated to pt then pt donned and doffed.  She has some difficulty reaching ankle/feet.    Lymphedema Assessment       Right Lower Extremity:  R Metatarsal (cm): 21.5 cm  R Heel Y Angle: 29.5 cm  R Ankle (cm): 24 cm  R 10 cm Above Ankle (cm): 22 cm  R 20 cm Above Ankle (cm): 30 cm  R 30 cm Above Ankle (cm): 36 cm  R 40 cm Above Ankle (cm): 0 cm  R Knee (cm): 0 cm  R 10 cm Above Knee (cm): 0 cm  R 20 cm Above Knee (cm): 0 cm  R 30 cm Above Knee (cm): 0 cm  R 40 cm Above Knee (cm): 0 cm  R 50 cm Above Knee (cm): 0 cm  Right lower extremity total: 163  Left Lower Extremity:  L Metatarsal (cm): 20 cm  L Heel Y Angle: 29.2 cm  L Ankle (cm): 25 cm  L 10 cm Above Ankle (cm): 22.5 cm  L 20 cm Above Ankle (cm): 30 cm  L 30 cm Above Ankle (cm): 33 cm  L 40 cm Above Ankle (cm): 0 cm  L Knee (cm): 0 cm  L 10 cm Above Knee (cm): 0 cm  L 20 cm Above Knee (cm): 0 cm  L 30 cm Above Knee (cm): 0 cm  L 40 cm Above Knee (cm): 0 cm  L 50 cm Above Knee (cm): 0 cm  Left Lower extremity total: 159.7  LE Skin Appearance/Condition and Girth:  No change     Pain Assessment:  Left hip pain, no number   Therapy Precautions:   Standard  lymphedema precautions      OP EDUCATION:  Inelastic wraps     Goals:  Active       OT Goals       LTG: Pt will show improvement on the LLIS impairment scale to no greater than 20% impaired.         Start:  04/22/25    Expected End:  07/15/25            STG: Decrease girth in bilateral LEs by 10 cm each for improved mobility and better clothing/shoe fit.         Start:  04/22/25    Expected End:  07/15/25            STG: Pt will be independent with self management, including use of garments, pump if indicated, self MLD, ability to recognize signs of infection/cellulitis and exercise/HEP to minimize risk of progression of symptoms and skin infections/cellulitis.        Start:  04/22/25    Expected End:  07/15/25            Client stated goal to manage lymphedema swelling before having hip surgery to avoid complications and have a better recovery overall       Start:  04/22/25    Expected End:  07/15/25

## 2025-06-20 ENCOUNTER — APPOINTMENT (OUTPATIENT)
Dept: PRIMARY CARE | Facility: CLINIC | Age: 80
End: 2025-06-20
Payer: MEDICARE

## 2025-06-23 ENCOUNTER — TELEPHONE (OUTPATIENT)
Dept: PRIMARY CARE | Facility: CLINIC | Age: 80
End: 2025-06-23
Payer: MEDICARE

## 2025-06-23 DIAGNOSIS — F41.9 ACUTE ANXIETY: ICD-10-CM

## 2025-06-23 NOTE — TELEPHONE ENCOUNTER
Robyn called to get a refill on the following medication:     Buspirone 15 mg 1 tablet 2 times a day    Pharmacy:  [facility]    Last seen: 6/5/2025 Beverley     Future appointment: 7/21/2025 jovita

## 2025-06-24 RX ORDER — BUSPIRONE HYDROCHLORIDE 15 MG/1
15 TABLET ORAL 2 TIMES DAILY
Qty: 180 TABLET | Refills: 0 | Status: SHIPPED | OUTPATIENT
Start: 2025-06-24

## 2025-06-30 DIAGNOSIS — M48.062 SPINAL STENOSIS OF LUMBAR REGION WITH NEUROGENIC CLAUDICATION: ICD-10-CM

## 2025-06-30 DIAGNOSIS — R60.0 LOWER LEG EDEMA: Primary | ICD-10-CM

## 2025-06-30 DIAGNOSIS — M79.2 NEUROPATHIC PAIN: ICD-10-CM

## 2025-06-30 RX ORDER — OXYCODONE HYDROCHLORIDE 5 MG/1
5 TABLET ORAL 2 TIMES DAILY PRN
Qty: 60 TABLET | Refills: 0 | Status: SHIPPED | OUTPATIENT
Start: 2025-06-30

## 2025-06-30 RX ORDER — GABAPENTIN 100 MG/1
300 CAPSULE ORAL 3 TIMES DAILY
Qty: 810 CAPSULE | Refills: 0 | Status: SHIPPED | OUTPATIENT
Start: 2025-06-30 | End: 2025-09-28

## 2025-06-30 NOTE — TELEPHONE ENCOUNTER
Pt is requesting refill of   oxycodone 5 mg 1 tablet po BID & gabapentin 100 mg po TID Awa Waddell                                                        LV:     6/4/25                NV:    8/25/25              OARRS reviewed with LFD:   6/16/25 #50/5 days & 5/18/25 Oxy 5 #60/30 days                         Pended RX to MIKHAIL Hermosillo for transmission to pharmacy.

## 2025-06-30 NOTE — TELEPHONE ENCOUNTER
Refill oxycodone 5 mg up to 2 times per day as needed for pain and gabapentin 300 mg 3 times daily.

## 2025-07-02 ENCOUNTER — APPOINTMENT (OUTPATIENT)
Dept: OCCUPATIONAL THERAPY | Facility: HOSPITAL | Age: 80
End: 2025-07-02
Payer: MEDICARE

## 2025-07-09 ENCOUNTER — APPOINTMENT (OUTPATIENT)
Dept: OCCUPATIONAL THERAPY | Facility: HOSPITAL | Age: 80
End: 2025-07-09
Payer: MEDICARE

## 2025-07-16 ENCOUNTER — TREATMENT (OUTPATIENT)
Dept: OCCUPATIONAL THERAPY | Facility: HOSPITAL | Age: 80
End: 2025-07-16
Payer: MEDICARE

## 2025-07-16 DIAGNOSIS — I89.0 LYMPHEDEMA: Primary | ICD-10-CM

## 2025-07-16 PROCEDURE — 97140 MANUAL THERAPY 1/> REGIONS: CPT | Mod: GO | Performed by: OCCUPATIONAL THERAPIST

## 2025-07-16 PROCEDURE — 97535 SELF CARE MNGMENT TRAINING: CPT | Mod: GO | Performed by: OCCUPATIONAL THERAPIST

## 2025-07-16 ASSESSMENT — ACTIVITIES OF DAILY LIVING (ADL): HOME_MANAGEMENT_TIME_ENTRY: 15

## 2025-07-16 NOTE — PROGRESS NOTES
Occupational Therapy    Occupational Therapy Treatment    Name: Robyn Madera  MRN: [id number]  : 1945  Date: 2025  Time Calculation  Start Time: 1500  Stop Time: 1540  Time Calculation (min): 40 min     OT Therapeutic Procedures Time Entry  Self Care/Home Management (ADLs) Time Entry: 15  Manual Therapy Time Entry: 25     Visit number: 7    Assessment:     Pain after treatment none.  No significant change in girth.  Plan:    Follow up in one month.     Subjective   General:    Pt had left hip replacement.  States she is doing well. Continues with the inelastic wraps.  Has not returned to using the pump yet.    Name and  confirmed  Objective    Therapy/Activity:   SELF CARE: Discussed getting a second pair of wraps next treatment.  Discussed home management.   MANUAL: Provided manual lymph drainage for bilateral LEs.   Utilized the anterior approach, supraclavicular notches, abdominal series, TERESA inguinals and TERESA LEs.  Reviewed self MLD.      Lymphedema Assessment       Right Lower Extremity:  R Metatarsal (cm): 22 cm  R Heel Y Angle: 29.3 cm  R Ankle (cm): 25.2 cm  R 10 cm Above Ankle (cm): 22 cm  R 20 cm Above Ankle (cm): 29.2 cm  R 30 cm Above Ankle (cm): 36 cm  R 40 cm Above Ankle (cm): 0 cm  R Knee (cm): 0 cm  R 10 cm Above Knee (cm): 0 cm  R 20 cm Above Knee (cm): 0 cm  R 30 cm Above Knee (cm): 0 cm  R 40 cm Above Knee (cm): 0 cm  R 50 cm Above Knee (cm): 0 cm  Right lower extremity total: 163.7  Left Lower Extremity:  L Metatarsal (cm): 20.5 cm  L Heel Y Angle: 30 cm  L Ankle (cm): 25 cm  L 10 cm Above Ankle (cm): 22 cm  L 20 cm Above Ankle (cm): 29 cm  L 30 cm Above Ankle (cm): 32 cm  L 40 cm Above Ankle (cm): 0 cm  L Knee (cm): 0 cm  L 10 cm Above Knee (cm): 0 cm  L 20 cm Above Knee (cm): 0 cm  L 30 cm Above Knee (cm): 0 cm  L 40 cm Above Knee (cm): 0 cm  L 50 cm Above Knee (cm): 0 cm  Left Lower extremity total: 158.5  LE Skin Appearance/Condition and Girth:  As previous     Pain  Assessment:  none  Therapy Precautions:   Standard lymphedema precautions  Recent  hip replacement     OP EDUCATION:  Home management.     Goals:  Active       OT Goals       LTG: Pt will show improvement on the LLIS impairment scale to no greater than 20% impaired.         Start:  04/22/25    Expected End:  07/15/25            STG: Decrease girth in bilateral LEs by 10 cm each for improved mobility and better clothing/shoe fit.         Start:  04/22/25    Expected End:  07/15/25            STG: Pt will be independent with self management, including use of garments, pump if indicated, self MLD, ability to recognize signs of infection/cellulitis and exercise/HEP to minimize risk of progression of symptoms and skin infections/cellulitis.        Start:  04/22/25    Expected End:  07/15/25            Client stated goal to manage lymphedema swelling before having hip surgery to avoid complications and have a better recovery overall       Start:  04/22/25    Expected End:  07/15/25

## 2025-07-19 PROBLEM — F41.9 ACUTE ANXIETY: Status: RESOLVED | Noted: 2023-06-23 | Resolved: 2025-07-19

## 2025-07-19 PROBLEM — R60.0 LEG EDEMA, RIGHT: Status: RESOLVED | Noted: 2024-01-22 | Resolved: 2025-07-19

## 2025-07-19 PROBLEM — S80.01XA CONTUSION OF RIGHT KNEE: Status: RESOLVED | Noted: 2024-08-21 | Resolved: 2025-07-19

## 2025-07-19 PROBLEM — Z00.00 ANNUAL PHYSICAL EXAM: Status: ACTIVE | Noted: 2025-07-19

## 2025-07-19 PROBLEM — R93.1 ELEVATED CORONARY ARTERY CALCIUM SCORE: Status: RESOLVED | Noted: 2024-01-22 | Resolved: 2025-07-19

## 2025-07-19 NOTE — ASSESSMENT & PLAN NOTE
Medical Wellness exam done.   DEXA 3/23  colonoscopy 8/20  Shingrix series complete.  RSV 9/23  Prevnar 13 10/15  Pneumovax 23 6/19  Nonsmoker  On opioids per Pain Mgt  Depression Screening  Depression screening completed using the PHQ-2 questions, with results documented in the chart/encounter (~5min).  (See Rooming Screening section for documentation, and/or progress note for additional information)

## 2025-07-19 NOTE — PROGRESS NOTES
Subjective :  Chief Complaint: Robyn Madera is an 80 y.o. female here for an annual Medicare Wellness visit,annual physical, and follow up labs and chronic conditions.    Moving to [place] to live with daughter 9/25.      Patient otherwise feels well. No other complaints or concerns.    I have reviewed and reconciled the medication list with the patient today.  Current Medications[1]    The patient's relevant past medical, surgical, family and social history was reviewed in [facility].  All pertinent lab work and results for this visit were reviewed with patient.    No visits with results within 6 Week(s) from this visit.   Latest known visit with results is:   Orders Only on 06/02/2025   Component Date Value Ref Range Status    CHOLESTEROL, TOTAL 06/02/2025 164  <200 mg/dL Final    HDL CHOLESTEROL 06/02/2025 83  > OR = 50 mg/dL Final    TRIGLYCERIDES 06/02/2025 57  <150 mg/dL Final    LDL-CHOLESTEROL 06/02/2025 68  mg/dL (calc) Final    Comment: Reference range: <100     Desirable range <100 mg/dL for primary prevention;    <70 mg/dL for patients with CHD or diabetic patients   with > or = 2 CHD risk factors.     LDL-C is now calculated using the Star-Lulu   calculation, which is a validated novel method providing   better accuracy than the Friedewald equation in the   estimation of LDL-C.   Star SS et al. MARIA LUISA. 2013;310(19): 4566-9069   (http://education.DermLink.Cinpost/faq/UKU961)      CHOL/HDLC RATIO 06/02/2025 2.0  <5.0 (calc) Final    NON HDL CHOLESTEROL 06/02/2025 81  <130 mg/dL (calc) Final    Comment: For patients with diabetes plus 1 major ASCVD risk   factor, treating to a non-HDL-C goal of <100 mg/dL   (LDL-C of <70 mg/dL) is considered a therapeutic   option.      DIRECT LDL 06/02/2025 75  <100 mg/dL Final    Comment:    Desirable range <100 mg/dL for primary prevention;    <70 mg/dL for patients with CHD or diabetic patients   with > or = 2 CHD risk factors.         GLUCOSE 06/02/2025 130 (H)  65  - 99 mg/dL Final    Comment:               Fasting reference interval     For someone without known diabetes, a glucose  value >125 mg/dL indicates that they may have  diabetes and this should be confirmed with a  follow-up test.         UREA NITROGEN (BUN) 06/02/2025 14  7 - 25 mg/dL Final    CREATININE 06/02/2025 0.61  0.60 - 1.00 mg/dL Final    EGFR 06/02/2025 91  > OR = 60 mL/min/1.73m2 Final    SODIUM 06/02/2025 140  135 - 146 mmol/L Final    POTASSIUM 06/02/2025 4.0  3.5 - 5.3 mmol/L Final    CHLORIDE 06/02/2025 102  98 - 110 mmol/L Final    CARBON DIOXIDE 06/02/2025 26  20 - 32 mmol/L Final    ELECTROLYTE BALANCE 06/02/2025 12  7 - 17 mmol/L (calc) Final    CALCIUM 06/02/2025 9.2  8.6 - 10.4 mg/dL Final    PROTEIN, TOTAL 06/02/2025 7.8  6.1 - 8.1 g/dL Final    ALBUMIN 06/02/2025 4.3  3.6 - 5.1 g/dL Final    BILIRUBIN, TOTAL 06/02/2025 0.7  0.2 - 1.2 mg/dL Final    ALKALINE PHOSPHATASE 06/02/2025 106  37 - 153 U/L Final    AST 06/02/2025 26  10 - 35 U/L Final    ALT 06/02/2025 24  6 - 29 U/L Final    T4, FREE 06/02/2025 1.0  0.8 - 1.8 ng/dL Final    TSH 06/02/2025 0.08 (L)  0.40 - 4.50 mIU/L Final    VITAMIN B12 06/02/2025 1,595 (H)  200 - 1,100 pg/mL Final    HEMOGLOBIN A1c 06/02/2025 6.4 (H)  <5.7 % Final    Comment: For someone without known diabetes, a hemoglobin   A1c value between 5.7% and 6.4% is consistent with  prediabetes and should be confirmed with a   follow-up test.     For someone with known diabetes, a value <7%  indicates that their diabetes is well controlled. A1c  targets should be individualized based on duration of  diabetes, age, comorbid conditions, and other  considerations.     This assay result is consistent with an increased risk  of diabetes.     Currently, no consensus exists regarding use of  hemoglobin A1c for diagnosis of diabetes for children.         eAG (mg/dL) 06/02/2025 137  mg/dL Final    eAG (mmol/L) 06/02/2025 7.6  mmol/L Final         Review of Systems   A complete  "review of systems was performed and all systems were normal except what is noted in the HPI.      List of current healthcare providers:  Patient Care Team:  Reanna Martinez MD as PCP - General  Reanna Martinez MD as PCP - [employer] PCP  Roselia Rivera MD as Consulting Physician (Endocrinology)  Carl William MD as Consulting Physician (Cardiology)  Cooper Merchant MD as Anesthesiologist (Pain Medicine)  BERNARDO Diaz as Nurse Practitioner (Urology)        Over the past 2 weeks, how often have you been bothered by any of the following problems?  Little interest or pleasure in doing things: Not at all  Feeling down, depressed, or hopeless: Not at all  Patient Health Questionnaire-2 Score: 0             Advance Care Planning:    Living Will: Yes  POA: Yes    Objective :  /65   Pulse (!) 111   Temp 36.4 °C (97.6 °F)   Ht 1.727 m (5' 8\")   Wt 63 kg (138 lb 12.8 oz)   SpO2 95%   BMI 21.10 kg/m²    No results found.  Physical Exam  Constitutional:       Appearance: Normal appearance.   HENT:      Head: Normocephalic and atraumatic.   Neck:      Vascular: No carotid bruit.     Cardiovascular:      Rate and Rhythm: Normal rate and regular rhythm.      Heart sounds: Normal heart sounds.   Pulmonary:      Effort: Pulmonary effort is normal.      Breath sounds: Normal breath sounds. No wheezing, rhonchi or rales.   Abdominal:      General: Abdomen is flat. Bowel sounds are normal.      Palpations: Abdomen is soft.      Tenderness: There is no abdominal tenderness. There is no guarding.     Musculoskeletal:         General: Normal range of motion.      Right lower leg: Edema present.      Left lower leg: Edema present.     Skin:     General: Skin is dry.     Neurological:      General: No focal deficit present.      Mental Status: She is alert and oriented to person, place, and time.     Psychiatric:         Mood and Affect: Mood normal.         Behavior: Behavior normal.         " Thought Content: Thought content normal.         Assessment/Plan :  Problem List Items Addressed This Visit       Benign essential hypertension    Well controlled. Continue current medicine and recheck in 6 months.           Relevant Orders    Comprehensive Metabolic Panel    Mixed hyperlipidemia    Well controlled. Continue current medicine and recheck in 6 months.           Relevant Orders    Lipid Panel    Cholesterol, LDL Direct    Impaired fasting glucose    A1c up to 6.4  Work on diet reviewed with patient.   Increase physical activity as able  Recheck 6 months          Relevant Orders    Hemoglobin A1C    Subclinical hyperthyroidism    Managed per endocrine         Relevant Orders    TSH with reflex to Free T4 if abnormal    Generalized anxiety disorder    Well controlled. Continue current medicine and recheck in 6 months.  Call for worsening depression or anxiety, suicidal or homicidal ideation.          B12 deficiency    Well controlled. Continue current medicine and recheck in 6 months.           Relevant Orders    CBC    Vitamin B12    Medicare annual wellness visit, subsequent - Primary    Medical Wellness exam done.   DEXA 3/23  colonoscopy 8/20  Shingrix series complete.  RSV 9/23  Prevnar 13 10/15  Pneumovax 23 6/19  Nonsmoker  On opioids per Pain Mgt  Depression Screening  Depression screening completed using the PHQ-2 questions, with results documented in the chart/encounter (~5min).  (See Rooming Screening section for documentation, and/or progress note for additional information)           Pancreatic cyst (HHS-HCC)    Patient will check with vascular to see if stent MRI compatible  If not, will order CT         Lymphedema    S/p stent per vascular  Seeing PT         Annual physical exam    Yearly physical done.                The following health maintenance schedule was reviewed with the patient and provided in printed form in the after visit summary:  Health Maintenance   Topic Date Due     DTaP/Tdap/Td Vaccines (3 - Td or Tdap) 02/15/2024    COVID-19 Vaccine (6 - 2024-25 season) 03/24/2025    Medicare Annual Wellness Visit (AWV)  06/11/2025    Influenza Vaccine (1) 09/01/2025    TSH Level  06/02/2026    Diabetes: Hemoglobin A1C  06/02/2026    Diabetes Screening  06/02/2026    Bone Density Scan  03/25/2027    Lipid Panel  06/02/2030    RSV High Risk: (Elderly (60+) or Pregnant Population)  Completed    Pneumococcal Vaccine  Completed    Hepatitis C Screening  Completed    HIB Vaccines  Aged Out    Hepatitis B Vaccines  Aged Out    IPV Vaccines  Aged Out    Hepatitis A Vaccines  Aged Out    Meningococcal Vaccine  Aged Out    Rotavirus Vaccines  Aged Out    HPV Vaccines  Aged Out    Welcome to Medicare Visit  Discontinued    Zoster Vaccines  Discontinued    Irritable Bowel Syndrome  Discontinued           Patient understands and agrees with treatment plan.          Reanna Martinez MD          [1]   Current Outpatient Medications:     acetaminophen (Tylenol) 500 mg tablet, Take 1 tablet (500 mg) by mouth once daily., Disp: , Rfl:     amLODIPine (Norvasc) 10 mg tablet, Take 1 tablet (10 mg) by mouth once daily., Disp: 90 tablet, Rfl: 3    aspirin 81 mg EC tablet, Take 1 tablet (81 mg) by mouth once daily., Disp: , Rfl:     atorvastatin (Lipitor) 40 mg tablet, Take 1 tablet (40 mg) by mouth once daily at bedtime., Disp: 90 tablet, Rfl: 3    busPIRone (Buspar) 15 mg tablet, Take 1 tablet (15 mg) by mouth 2 times a day., Disp: 180 tablet, Rfl: 0    calcium carbonate-vitamin D3 500 mg-3.125 mcg (125 unit) tablet tablet, Take by mouth., Disp: , Rfl:     cyanocobalamin (Vitamin B-12) 1,000 mcg tablet, Take 1 tablet (1,000 mcg) by mouth once daily., Disp: , Rfl:     diclofenac sodium (Voltaren) 1 % gel, Apply 4.5 inches (4 g) topically 4 times a day as needed (for knee pain)., Disp: , Rfl:     docusate sodium (Colace) 100 mg capsule, Take by mouth twice a day., Disp: , Rfl:     DULoxetine (Cymbalta) 30 mg   capsule, Take 1 capsule (30 mg) by mouth once daily. Do not crush or chew.  Patient will take duloxetine 30 mg and 60 mg capsule daily for a total dose of 90 mg daily., Disp: 90 capsule, Rfl: 1    DULoxetine (Cymbalta) 60 mg DR capsule, Take 1 capsule (60 mg) by mouth once daily. Do not crush or chew.  Will take duloxetine 60 mg and 30 mg capsule daily for a total dose of 90 mg daily., Disp: 90 capsule, Rfl: 1    esomeprazole (NexIUM) 20 mg DR capsule, Take 1 capsule (20 mg) by mouth once daily., Disp: , Rfl:     fesoterodine (Toviaz) 4 mg tablet extended release 24 hr, Take 1 tablet (4 mg) by mouth once daily., Disp: 90 tablet, Rfl: 3    gabapentin (Neurontin) 100 mg capsule, Take 3 capsules (300 mg) by mouth 3 times a day., Disp: 810 capsule, Rfl: 0    lactase (Lactaid) 3,000 unit tablet, Take 1 tablet (3,000 Units) by mouth 3 times a day as needed (for dairy consumption)., Disp: , Rfl:     lubiprostone (Amitiza) 8 mcg capsule, Take 1 capsule (8 mcg) by mouth 2 times daily (morning and late afternoon). Take with meals, Disp: 60 capsule, Rfl: 11    methIMAzole (Tapazole) 10 mg tablet, Take 1 tablet (10 mg) by mouth once daily., Disp: 90 tablet, Rfl: 1    mirabegron (Myrbetriq) 50 mg tablet extended release 24 hr 24 hr tablet, TAKE 1 TABLET(50 MG) BY MOUTH DAILY, Disp: 90 tablet, Rfl: 11    nystatin (Mycostatin) 100,000 unit/mL suspension, Swish and swallow 5 mL by mouth 4 times daily for 10 days, Disp: 200 mL, Rfl: 0    oxyCODONE (Roxicodone) 5 mg immediate release tablet, Take 1 tablet (5 mg) by mouth 2 times a day as needed for severe pain (7 - 10)., Disp: 60 tablet, Rfl: 0    diazePAM (Valium) 5 mg tablet, Take 1 tablet (5 mg) by mouth see administration instructions for 1 day. Patient may take diazepam 5 mg 1 to 2 tablets prior to her MRI, Disp: 2 tablet, Rfl: 0    omeprazole OTC (PriLOSEC OTC) 20 mg EC tablet, Take 1 tablet (20 mg) by mouth once daily in the morning. Take before meals. Do not crush, chew, or  split. (Patient not taking: Reported on 7/21/2025), Disp: , Rfl:

## 2025-07-19 NOTE — ASSESSMENT & PLAN NOTE
A1c up to 6.4  Work on diet reviewed with patient.   Increase physical activity as able  Recheck 6 months

## 2025-07-21 ENCOUNTER — APPOINTMENT (OUTPATIENT)
Dept: PRIMARY CARE | Facility: CLINIC | Age: 80
End: 2025-07-21
Payer: MEDICARE

## 2025-07-21 VITALS
HEIGHT: 68 IN | OXYGEN SATURATION: 95 % | DIASTOLIC BLOOD PRESSURE: 65 MMHG | HEART RATE: 111 BPM | TEMPERATURE: 97.6 F | BODY MASS INDEX: 21.04 KG/M2 | SYSTOLIC BLOOD PRESSURE: 123 MMHG | WEIGHT: 138.8 LBS

## 2025-07-21 DIAGNOSIS — I10 BENIGN ESSENTIAL HYPERTENSION: ICD-10-CM

## 2025-07-21 DIAGNOSIS — K86.2 PANCREATIC CYST (HHS-HCC): ICD-10-CM

## 2025-07-21 DIAGNOSIS — Z00.00 ANNUAL PHYSICAL EXAM: ICD-10-CM

## 2025-07-21 DIAGNOSIS — R73.01 IMPAIRED FASTING GLUCOSE: ICD-10-CM

## 2025-07-21 DIAGNOSIS — E53.8 B12 DEFICIENCY: ICD-10-CM

## 2025-07-21 DIAGNOSIS — E05.90 SUBCLINICAL HYPERTHYROIDISM: ICD-10-CM

## 2025-07-21 DIAGNOSIS — I89.0 LYMPHEDEMA: ICD-10-CM

## 2025-07-21 DIAGNOSIS — Z00.00 MEDICARE ANNUAL WELLNESS VISIT, SUBSEQUENT: Primary | ICD-10-CM

## 2025-07-21 DIAGNOSIS — E78.2 MIXED HYPERLIPIDEMIA: ICD-10-CM

## 2025-07-21 DIAGNOSIS — F41.1 GENERALIZED ANXIETY DISORDER: ICD-10-CM

## 2025-07-21 PROCEDURE — 1158F ADVNC CARE PLAN TLK DOCD: CPT | Performed by: FAMILY MEDICINE

## 2025-07-21 PROCEDURE — 1160F RVW MEDS BY RX/DR IN RCRD: CPT | Performed by: FAMILY MEDICINE

## 2025-07-21 PROCEDURE — 3078F DIAST BP <80 MM HG: CPT | Performed by: FAMILY MEDICINE

## 2025-07-21 PROCEDURE — G0439 PPPS, SUBSEQ VISIT: HCPCS | Performed by: FAMILY MEDICINE

## 2025-07-21 PROCEDURE — G0444 DEPRESSION SCREEN ANNUAL: HCPCS | Performed by: FAMILY MEDICINE

## 2025-07-21 PROCEDURE — 1159F MED LIST DOCD IN RCRD: CPT | Performed by: FAMILY MEDICINE

## 2025-07-21 PROCEDURE — 1170F FXNL STATUS ASSESSED: CPT | Performed by: FAMILY MEDICINE

## 2025-07-21 PROCEDURE — 3074F SYST BP LT 130 MM HG: CPT | Performed by: FAMILY MEDICINE

## 2025-07-21 PROCEDURE — 1036F TOBACCO NON-USER: CPT | Performed by: FAMILY MEDICINE

## 2025-07-21 PROCEDURE — 99397 PER PM REEVAL EST PAT 65+ YR: CPT | Performed by: FAMILY MEDICINE

## 2025-07-21 PROCEDURE — 99214 OFFICE O/P EST MOD 30 MIN: CPT | Performed by: FAMILY MEDICINE

## 2025-07-21 ASSESSMENT — ACTIVITIES OF DAILY LIVING (ADL)
GROCERY_SHOPPING: NEEDS ASSISTANCE
MANAGING_FINANCES: INDEPENDENT
TAKING_MEDICATION: INDEPENDENT
BATHING: NEEDS ASSISTANCE
DRESSING: INDEPENDENT
DOING_HOUSEWORK: NEEDS ASSISTANCE

## 2025-07-21 ASSESSMENT — ENCOUNTER SYMPTOMS
OCCASIONAL FEELINGS OF UNSTEADINESS: 1
LOSS OF SENSATION IN FEET: 0
DEPRESSION: 0

## 2025-07-22 ENCOUNTER — TELEPHONE (OUTPATIENT)
Dept: VASCULAR SURGERY | Facility: HOSPITAL | Age: 80
End: 2025-07-22
Payer: MEDICARE

## 2025-07-22 NOTE — TELEPHONE ENCOUNTER
Pt would like to know if stent is MRI compatible. If we do not have the info PCP will do CT scan      WDL

## 2025-07-23 ENCOUNTER — TELEPHONE (OUTPATIENT)
Dept: CARDIOLOGY | Facility: HOSPITAL | Age: 80
End: 2025-07-23
Payer: MEDICARE

## 2025-07-23 NOTE — TELEPHONE ENCOUNTER
Patient called office to inform us [place]. Patient asked to have her yearly appt moved up so she could have one last consult with Dr. William. Asked patient if she has found a new cardiologist where she is moving to. Patient states she hasn't. Informed patient the day of her appt she can sign a medical release form to have all of her cardiac records pulled so she can take them wherever she ends up having cardiac care. Patient verbalized understanding and said that would be easy for her. Will inform office staff to have records/ form ready for her to .   
Private car

## 2025-07-24 DIAGNOSIS — K86.2 PANCREATIC CYST (HHS-HCC): Primary | ICD-10-CM

## 2025-07-25 DIAGNOSIS — F41.1 GENERALIZED ANXIETY DISORDER: Primary | ICD-10-CM

## 2025-07-25 RX ORDER — DIAZEPAM 5 MG/1
TABLET ORAL
Qty: 2 TABLET | Refills: 0 | Status: SHIPPED | OUTPATIENT
Start: 2025-07-25

## 2025-07-28 DIAGNOSIS — M79.2 NEUROPATHIC PAIN: ICD-10-CM

## 2025-07-28 DIAGNOSIS — M48.062 SPINAL STENOSIS OF LUMBAR REGION WITH NEUROGENIC CLAUDICATION: ICD-10-CM

## 2025-07-28 NOTE — TELEPHONE ENCOUNTER
Pt is requesting refill of  oxycodone 5 mg 1 tablet po BID Awa Waddell                                                         LV:  6/4/25                  NV: 8/25/25                 [place]AR[place] reviewed with [facility]:    7/1/25 #60/30 days                       Pended RX to Dr. Merchant for transmission to pharmacy

## 2025-07-29 RX ORDER — OXYCODONE HYDROCHLORIDE 5 MG/1
5 TABLET ORAL 2 TIMES DAILY PRN
Qty: 60 TABLET | Refills: 0 | Status: SHIPPED | OUTPATIENT
Start: 2025-07-31

## 2025-07-30 ENCOUNTER — TELEPHONE (OUTPATIENT)
Facility: CLINIC | Age: 80
End: 2025-07-30
Payer: MEDICARE

## 2025-07-30 DIAGNOSIS — K59.03 THERAPEUTIC OPIOID INDUCED CONSTIPATION: ICD-10-CM

## 2025-07-30 DIAGNOSIS — T40.2X5A THERAPEUTIC OPIOID INDUCED CONSTIPATION: ICD-10-CM

## 2025-07-30 RX ORDER — LUBIPROSTONE 0.01 MG/1
8 CAPSULE ORAL
Qty: 60 CAPSULE | Refills: 11 | Status: SHIPPED | OUTPATIENT
Start: 2025-07-30 | End: 2026-07-30

## 2025-08-02 LAB
T3FREE SERPL-MCNC: 3.1 PG/ML (ref 2.3–4.2)
T4 FREE SERPL-MCNC: 0.8 NG/DL (ref 0.8–1.8)
TSH SERPL-ACNC: 0.59 MIU/L (ref 0.4–4.5)

## 2025-08-06 DIAGNOSIS — E05.90 SUBCLINICAL HYPERTHYROIDISM: Primary | ICD-10-CM

## 2025-08-13 ENCOUNTER — TREATMENT (OUTPATIENT)
Dept: OCCUPATIONAL THERAPY | Facility: HOSPITAL | Age: 80
End: 2025-08-13
Payer: MEDICARE

## 2025-08-13 ENCOUNTER — HOSPITAL ENCOUNTER (OUTPATIENT)
Dept: RADIOLOGY | Facility: CLINIC | Age: 80
Discharge: HOME | End: 2025-08-13
Payer: MEDICARE

## 2025-08-13 DIAGNOSIS — I89.0 LYMPHEDEMA: Primary | ICD-10-CM

## 2025-08-13 DIAGNOSIS — K86.2 PANCREATIC CYST (HHS-HCC): ICD-10-CM

## 2025-08-13 PROCEDURE — 76376 3D RENDER W/INTRP POSTPROCES: CPT | Performed by: RADIOLOGY

## 2025-08-13 PROCEDURE — 97535 SELF CARE MNGMENT TRAINING: CPT | Mod: GO | Performed by: OCCUPATIONAL THERAPIST

## 2025-08-13 PROCEDURE — 2550000001 HC RX 255 CONTRASTS: Performed by: FAMILY MEDICINE

## 2025-08-13 PROCEDURE — 74183 MRI ABD W/O CNTR FLWD CNTR: CPT

## 2025-08-13 PROCEDURE — A9575 INJ GADOTERATE MEGLUMI 0.1ML: HCPCS | Performed by: FAMILY MEDICINE

## 2025-08-13 PROCEDURE — 74183 MRI ABD W/O CNTR FLWD CNTR: CPT | Performed by: RADIOLOGY

## 2025-08-13 RX ORDER — GADOTERATE MEGLUMINE 376.9 MG/ML
13 INJECTION INTRAVENOUS
Status: COMPLETED | OUTPATIENT
Start: 2025-08-13 | End: 2025-08-13

## 2025-08-13 RX ADMIN — GADOTERATE MEGLUMINE 13 ML: 376.9 INJECTION INTRAVENOUS at 11:30

## 2025-08-13 ASSESSMENT — ACTIVITIES OF DAILY LIVING (ADL): HOME_MANAGEMENT_TIME_ENTRY: 55

## 2025-08-14 ENCOUNTER — RESULTS FOLLOW-UP (OUTPATIENT)
Dept: PRIMARY CARE | Facility: CLINIC | Age: 80
End: 2025-08-14
Payer: MEDICARE

## 2025-08-14 DIAGNOSIS — K86.2 PANCREATIC CYST (HHS-HCC): Primary | ICD-10-CM

## 2025-08-14 DIAGNOSIS — Z12.31 ENCOUNTER FOR SCREENING MAMMOGRAM FOR MALIGNANT NEOPLASM OF BREAST: ICD-10-CM

## 2025-08-14 DIAGNOSIS — Z12.39 BREAST SCREENING: ICD-10-CM

## 2025-08-18 ENCOUNTER — TELEPHONE (OUTPATIENT)
Dept: PRIMARY CARE | Facility: CLINIC | Age: 80
End: 2025-08-18
Payer: MEDICARE

## 2025-08-18 DIAGNOSIS — I10 BENIGN ESSENTIAL HYPERTENSION: ICD-10-CM

## 2025-08-18 RX ORDER — AMLODIPINE BESYLATE 10 MG/1
10 TABLET ORAL DAILY
Qty: 90 TABLET | Refills: 3 | Status: SHIPPED | OUTPATIENT
Start: 2025-08-18 | End: 2026-08-18

## 2025-09-02 ENCOUNTER — APPOINTMENT (OUTPATIENT)
Dept: UROLOGY | Facility: CLINIC | Age: 80
End: 2025-09-02
Payer: MEDICARE

## 2025-09-03 ENCOUNTER — APPOINTMENT (OUTPATIENT)
Facility: CLINIC | Age: 80
End: 2025-09-03
Payer: MEDICARE

## 2025-09-12 ENCOUNTER — APPOINTMENT (OUTPATIENT)
Facility: CLINIC | Age: 80
End: 2025-09-12
Payer: MEDICARE

## 2025-09-23 ENCOUNTER — APPOINTMENT (OUTPATIENT)
Dept: CARDIOLOGY | Facility: HOSPITAL | Age: 80
End: 2025-09-23
Payer: MEDICARE

## 2025-10-07 ENCOUNTER — APPOINTMENT (OUTPATIENT)
Dept: UROLOGY | Facility: CLINIC | Age: 80
End: 2025-10-07
Payer: MEDICARE

## 2025-10-08 ENCOUNTER — APPOINTMENT (OUTPATIENT)
Dept: VASCULAR SURGERY | Facility: HOSPITAL | Age: 80
End: 2025-10-08
Payer: MEDICARE

## 2025-12-02 ENCOUNTER — APPOINTMENT (OUTPATIENT)
Dept: CARDIOLOGY | Facility: HOSPITAL | Age: 80
End: 2025-12-02
Payer: MEDICARE

## 2026-03-06 ENCOUNTER — APPOINTMENT (OUTPATIENT)
Facility: CLINIC | Age: 81
End: 2026-03-06
Payer: MEDICARE

## (undated) DEVICE — DRAPE, C-ARM IMAGE

## (undated) DEVICE — DRESSING, TRANSPARENT, TEGADERM, 4 X 4-3/4 IN

## (undated) DEVICE — INTRODUCER, SHEATH, AVANTI PLUS, W/MINI GUIDEWIRE, 9 FR X 11 CM

## (undated) DEVICE — GLOVE, PROTEXIS PI CLASSIC, SZ-7.0, PF, LF

## (undated) DEVICE — APPLICATOR, CHLORAPREP, W/ORANGE TINT, 26ML

## (undated) DEVICE — CATHETER, VISIONS PV 8.2 (IVUS)

## (undated) DEVICE — DRAPE, SHEET, LAPAROTOMY, TRANSVERSE, W/FENESTRATION, 77 X 122 IN, DISPOSABLE, LF, STERILE

## (undated) DEVICE — TOWEL PACK, STERILE, 4/PACK, BLUE

## (undated) DEVICE — SYRINGE, 30 CC, LUER LOCK

## (undated) DEVICE — LABELING SYSTEM, CORRECT MEDICATION, CATH LAB

## (undated) DEVICE — DRAPE, EQUIPMENT, 50 X 25IN, F/XRAY C-ARM

## (undated) DEVICE — GLOVE, RADIATION, ATTENUATION, SIZE-7.5, PF

## (undated) DEVICE — GOWN, ASTOUND, XL

## (undated) DEVICE — SET-UP PACK, BASIC, MAYO STAND COVER, SUTURE BAG, TABLE COVER, LF

## (undated) DEVICE — COUNTER, FOAM STRIP & NEEDLE

## (undated) DEVICE — GUIDEWIRE, ANGLE TIP,  .035 DIA, 180 CM, 3 CM TIP"

## (undated) DEVICE — SOLUTION, IRRIGATION, STERILE WATER, 1000 ML, POUR BOTTLE

## (undated) DEVICE — SYRINGE, 20 CC, LUER LOCK

## (undated) DEVICE — NEEDLE, PERCUTANEOUS ENTRY, THINWALL, W/O BASEPLATE, 18 G X 7 CM

## (undated) DEVICE — DRESSING, GAUZE, SPONGE, VERSALON, ALL PURPOSE, 4 X 4 IN, SOFT

## (undated) DEVICE — SPONGE, GAUZE, XRAY DECT, 16 PLY, 4 X 4, W/MASTER WDMT,STERILE

## (undated) DEVICE — BALLOON, ATLAS GOLD, 8F 16 X 4 X 80

## (undated) DEVICE — DEVICE, INFLATION, PRESTO ID40ATM 30CC

## (undated) DEVICE — WIPE, INSTRUMENT, POLYVINYL ALCOHOL, 2 1/2 X 2 1/2

## (undated) DEVICE — DRAPE, SHEET, THREE QUARTER, FAN FOLD, 57 X 77 IN

## (undated) DEVICE — SYRINGE, 5 CC, LUER LOCK